# Patient Record
Sex: FEMALE | Race: WHITE | NOT HISPANIC OR LATINO | Employment: FULL TIME | URBAN - METROPOLITAN AREA
[De-identification: names, ages, dates, MRNs, and addresses within clinical notes are randomized per-mention and may not be internally consistent; named-entity substitution may affect disease eponyms.]

---

## 2017-01-04 RX ORDER — PHENOL 1.4 %
10 AEROSOL, SPRAY (ML) MUCOUS MEMBRANE NIGHTLY
COMMUNITY

## 2017-01-04 RX ORDER — AMITRIPTYLINE HYDROCHLORIDE 10 MG/1
10 TABLET, FILM COATED ORAL NIGHTLY
COMMUNITY
End: 2018-05-03 | Stop reason: SDUPTHER

## 2017-01-04 RX ORDER — ESCITALOPRAM OXALATE 10 MG/1
10 TABLET ORAL DAILY
COMMUNITY
End: 2017-12-21

## 2017-01-09 ENCOUNTER — HOSPITAL ENCOUNTER (OUTPATIENT)
Dept: ULTRASOUND IMAGING | Facility: HOSPITAL | Age: 62
Discharge: HOME | End: 2017-01-09
Attending: OBSTETRICS & GYNECOLOGY | Admitting: OBSTETRICS & GYNECOLOGY

## 2017-01-09 VITALS
HEIGHT: 66 IN | BODY MASS INDEX: 30.53 KG/M2 | DIASTOLIC BLOOD PRESSURE: 90 MMHG | OXYGEN SATURATION: 99 % | RESPIRATION RATE: 16 BRPM | SYSTOLIC BLOOD PRESSURE: 140 MMHG | HEART RATE: 87 BPM | WEIGHT: 190 LBS | TEMPERATURE: 98.2 F

## 2017-01-09 DIAGNOSIS — N63.0 BREAST MASS: ICD-10-CM

## 2017-01-09 PROCEDURE — 88305 TISSUE EXAM BY PATHOLOGIST: CPT | Performed by: OBSTETRICS & GYNECOLOGY

## 2017-01-09 RX ORDER — LIDOCAINE HYDROCHLORIDE AND EPINEPHRINE 10; 10 MG/ML; UG/ML
20 INJECTION, SOLUTION INFILTRATION; PERINEURAL ONCE
Status: COMPLETED | OUTPATIENT
Start: 2017-01-09 | End: 2017-01-09

## 2017-01-09 RX ADMIN — LIDOCAINE HYDROCHLORIDE AND EPINEPHRINE 10 ML: 10; 10 INJECTION, SOLUTION INFILTRATION; PERINEURAL at 14:20

## 2017-01-09 RX ADMIN — SODIUM BICARBONATE 10 ML: 84 INJECTION, SOLUTION INTRAVENOUS at 14:20

## 2017-01-11 PROBLEM — C50.911 MALIGNANT NEOPLASM OF RIGHT FEMALE BREAST: Status: ACTIVE | Noted: 2017-01-11

## 2017-01-12 ENCOUNTER — HOSPITAL ENCOUNTER (OUTPATIENT)
Dept: ULTRASOUND IMAGING | Facility: HOSPITAL | Age: 62
Discharge: HOME | End: 2017-01-12
Attending: OBSTETRICS & GYNECOLOGY

## 2017-01-13 LAB
CYTO UR: NORMAL
LAB AP CASE REPORT: NORMAL
Lab: NORMAL
PATH REPORT.ADDENDUM SPEC: NORMAL
PATH REPORT.FINAL DX SPEC: NORMAL
PATH REPORT.GROSS SPEC: NORMAL

## 2017-01-23 ENCOUNTER — OFFICE VISIT (OUTPATIENT)
Dept: MAMMOGRAPHY | Facility: CLINIC | Age: 62
End: 2017-01-23

## 2017-01-23 VITALS
BODY MASS INDEX: 30.13 KG/M2 | HEIGHT: 67 IN | DIASTOLIC BLOOD PRESSURE: 75 MMHG | HEART RATE: 86 BPM | WEIGHT: 192 LBS | TEMPERATURE: 97.9 F | SYSTOLIC BLOOD PRESSURE: 135 MMHG | OXYGEN SATURATION: 96 %

## 2017-01-23 DIAGNOSIS — C50.411 BREAST CANCER OF UPPER-OUTER QUADRANT OF RIGHT FEMALE BREAST (HCC): Primary | ICD-10-CM

## 2017-01-23 PROCEDURE — 99205 OFFICE O/P NEW HI 60 MIN: CPT | Performed by: SURGERY

## 2017-01-23 RX ORDER — ACETAMINOPHEN 325 MG/1
1000 TABLET ORAL ONCE
Status: CANCELLED | OUTPATIENT
Start: 2017-01-23 | End: 2017-01-23

## 2017-01-23 RX ORDER — LUTEIN 10 MG
TABLET ORAL
COMMUNITY
End: 2019-08-29

## 2017-01-23 RX ORDER — LIDOCAINE AND PRILOCAINE 25; 25 MG/G; MG/G
CREAM TOPICAL ONCE
Status: CANCELLED | OUTPATIENT
Start: 2017-01-23 | End: 2017-01-23

## 2017-01-23 RX ORDER — CELECOXIB 100 MG/1
400 CAPSULE ORAL ONCE
Status: CANCELLED | OUTPATIENT
Start: 2017-01-23 | End: 2017-01-23

## 2017-01-23 RX ORDER — DIAZEPAM 2 MG/1
10 TABLET ORAL ONCE
Status: CANCELLED | OUTPATIENT
Start: 2017-01-23 | End: 2017-01-23

## 2017-01-23 RX ORDER — CEFAZOLIN SODIUM 2 G/100ML
2 INJECTION, SOLUTION INTRAVENOUS ONCE
Status: CANCELLED | OUTPATIENT
Start: 2017-01-23 | End: 2017-01-23

## 2017-01-24 ENCOUNTER — TELEPHONE (OUTPATIENT)
Dept: MAMMOGRAPHY | Facility: CLINIC | Age: 62
End: 2017-01-24

## 2017-01-24 NOTE — TELEPHONE ENCOUNTER
Left patient voicemail for PAT appointment time and date.    Feb 6th arrival time is 1230 pm main entrance building c    sd/

## 2017-02-08 ENCOUNTER — APPOINTMENT (OUTPATIENT)
Dept: PREADMISSION TESTING | Facility: HOSPITAL | Age: 62
End: 2017-02-08

## 2017-02-08 ENCOUNTER — HOSPITAL ENCOUNTER (OUTPATIENT)
Dept: GENERAL RADIOLOGY | Facility: HOSPITAL | Age: 62
Discharge: HOME | End: 2017-02-08
Admitting: SURGERY

## 2017-02-08 DIAGNOSIS — C50.411 BREAST CANCER OF UPPER-OUTER QUADRANT OF RIGHT FEMALE BREAST (HCC): ICD-10-CM

## 2017-02-08 LAB
ALBUMIN SERPL-MCNC: 4 G/DL (ref 3.5–5.2)
ALBUMIN/GLOB SERPL: 1.4 G/DL
ALP SERPL-CCNC: 85 U/L (ref 39–117)
ALT SERPL W P-5'-P-CCNC: 17 U/L (ref 1–33)
ANION GAP SERPL CALCULATED.3IONS-SCNC: 10.2 MMOL/L
AST SERPL-CCNC: 15 U/L (ref 1–32)
BILIRUB SERPL-MCNC: <0.2 MG/DL (ref 0.1–1.2)
BUN BLD-MCNC: 11 MG/DL (ref 8–23)
BUN/CREAT SERPL: 19 (ref 7–25)
CALCIUM SPEC-SCNC: 9.5 MG/DL (ref 8.6–10.5)
CHLORIDE SERPL-SCNC: 103 MMOL/L (ref 98–107)
CO2 SERPL-SCNC: 27.8 MMOL/L (ref 22–29)
CREAT BLD-MCNC: 0.58 MG/DL (ref 0.57–1)
DEPRECATED RDW RBC AUTO: 44.1 FL (ref 37–54)
ERYTHROCYTE [DISTWIDTH] IN BLOOD BY AUTOMATED COUNT: 12.6 % (ref 11.7–13)
GFR SERPL CREATININE-BSD FRML MDRD: 106 ML/MIN/1.73
GLOBULIN UR ELPH-MCNC: 2.8 GM/DL
GLUCOSE BLD-MCNC: 100 MG/DL (ref 65–99)
HCT VFR BLD AUTO: 38.1 % (ref 35.6–45.5)
HGB BLD-MCNC: 12.5 G/DL (ref 11.9–15.5)
MCH RBC QN AUTO: 31.4 PG (ref 26.9–32)
MCHC RBC AUTO-ENTMCNC: 32.8 G/DL (ref 32.4–36.3)
MCV RBC AUTO: 95.7 FL (ref 80.5–98.2)
PLATELET # BLD AUTO: 273 10*3/MM3 (ref 140–500)
PMV BLD AUTO: 8.9 FL (ref 6–12)
POTASSIUM BLD-SCNC: 3.9 MMOL/L (ref 3.5–5.2)
PROT SERPL-MCNC: 6.8 G/DL (ref 6–8.5)
RBC # BLD AUTO: 3.98 10*6/MM3 (ref 3.9–5.2)
SODIUM BLD-SCNC: 141 MMOL/L (ref 136–145)
WBC NRBC COR # BLD: 4.55 10*3/MM3 (ref 4.5–10.7)

## 2017-02-08 PROCEDURE — 36415 COLL VENOUS BLD VENIPUNCTURE: CPT

## 2017-02-08 PROCEDURE — 80053 COMPREHEN METABOLIC PANEL: CPT | Performed by: SURGERY

## 2017-02-08 PROCEDURE — 71020 HC CHEST PA AND LATERAL: CPT

## 2017-02-08 PROCEDURE — 93005 ELECTROCARDIOGRAM TRACING: CPT

## 2017-02-08 PROCEDURE — 93010 ELECTROCARDIOGRAM REPORT: CPT | Performed by: INTERNAL MEDICINE

## 2017-02-08 PROCEDURE — 85027 COMPLETE CBC AUTOMATED: CPT | Performed by: SURGERY

## 2017-02-08 NOTE — DISCHARGE INSTRUCTIONS
Take the following medications the morning of surgery with a small sip of water.  NONE    ARRIVE AT 1030.        General Instructions:  • Do not eat or drink after midnight: includes water, mints, or gum. You may brush your teeth.  • Do not smoke, chew tobacco, or drink alcohol.  • Bring medications in original bottles, any inhalers and if applicable your C-PAP/ BI-PAP machine.  • Bring any papers given to you in the doctor’s office.  • Wear clean comfortable clothes and socks.  • Do not wear contact lenses or make-up.  Bring a case for your glasses if applicable.   • Bring crutches or walker if applicable.  • Leave all other valuables and jewelry at home.    If you were given a blood bank ID arm band remember to bring it with you the day of surgery.    Preventing a Surgical Site Infection:  Shower on the morning of surgery using a fresh bar of anti-bacterial soap (such as Dial) and clean washcloth.  Dry with a clean towel and dress in clean clothing.  For 2 to 3 days before surgery, avoid shaving with a razor near where you will have surgery because the razor can irritate skin and make it easier to develop an infection  Ask your surgeon if you will be receiving antibiotics prior to surgery  Make sure you, your family, and all healthcare providers clean their hands with soap and water or an alcohol based hand  before caring for you or your wound  If at all possible, quit smoking as many days before surgery as you can.    Day of surgery:  Upon arrival, a Pre-op nurse and Anesthesiologist will review your health history, obtain vital signs, and answer questions you may have.  The only belongings needed at this time will be your home medications and if applicable your C-PAP/BI-PAP machine.  If you are staying overnight your family can leave the rest of your belongings in the car and bring them to your room later.  A Pre-op nurse will start an IV and you may receive medication in preparation for surgery,  including something to help you relax.  Your family will be able to see you in the Pre-op area.  While you are in surgery your family should notify the waiting room  if they leave the waiting room area and provide a contact phone number.    Please be aware that surgery does come with discomfort.  We want to make every effort to control your discomfort so please discuss any uncontrolled symptoms with your nurse.   Your doctor will most likely have prescribed pain medications.      If you are going home after surgery you will receive individualized written care instructions before being discharged.  A responsible adult must drive you to and from the hospital on the day of your surgery and stay with you for 24 hours.    If you are staying overnight following surgery, you will be transported to your hospital room following the recovery period.  [facility] has all private rooms.    If you have any questions please call Pre-Admission Testing at [phone].  Deductibles and co-payments are collected on the day of service. Please be prepared to pay the required co-pay, deductible or deposit on the day of service as defined by your plan.

## 2017-02-11 ENCOUNTER — ANESTHESIA EVENT (OUTPATIENT)
Dept: PERIOP | Facility: HOSPITAL | Age: 62
End: 2017-02-11

## 2017-02-11 NOTE — ANESTHESIA PREPROCEDURE EVALUATION
Anesthesia Evaluation     Patient summary reviewed and Nursing notes reviewed   history of anesthetic complications: PONV     Airway   Mallampati: I  TM distance: >3 FB  Neck ROM: full  no difficulty expected  Dental      Pulmonary - negative pulmonary ROS   (-) wheezes  Cardiovascular - negative cardio ROS    ECG reviewed  Rhythm: regular  Rate: normal        Neuro/Psych  (+) psychiatric history Depression,    GI/Hepatic/Renal/Endo - negative ROS     Musculoskeletal (-) negative ROS    Abdominal    Substance History - negative use     OB/GYN negative ob/gyn ROS         Other                                  Anesthesia Plan    ASA 2     general   (EKG: NSR with T wave//left atrial abnormalities  Scope patch ordered pre operatively as requested by patient who also prefers zofran and, if needed, phenergan)  intravenous induction

## 2017-02-13 ENCOUNTER — HOSPITAL ENCOUNTER (OUTPATIENT)
Dept: MAMMOGRAPHY | Facility: HOSPITAL | Age: 62
Discharge: HOME | End: 2017-02-13
Attending: SURGERY

## 2017-02-13 ENCOUNTER — HOSPITAL ENCOUNTER (OUTPATIENT)
Facility: HOSPITAL | Age: 62
Setting detail: HOSPITAL OUTPATIENT SURGERY
Discharge: HOME | End: 2017-02-13
Attending: SURGERY | Admitting: SURGERY

## 2017-02-13 ENCOUNTER — APPOINTMENT (OUTPATIENT)
Dept: GENERAL RADIOLOGY | Facility: HOSPITAL | Age: 62
End: 2017-02-13

## 2017-02-13 ENCOUNTER — HOSPITAL ENCOUNTER (OUTPATIENT)
Dept: NUCLEAR MEDICINE | Facility: HOSPITAL | Age: 62
Discharge: HOME | End: 2017-02-13
Attending: SURGERY

## 2017-02-13 ENCOUNTER — ANESTHESIA (OUTPATIENT)
Dept: PERIOP | Facility: HOSPITAL | Age: 62
End: 2017-02-13

## 2017-02-13 VITALS
RESPIRATION RATE: 16 BRPM | SYSTOLIC BLOOD PRESSURE: 133 MMHG | HEART RATE: 84 BPM | DIASTOLIC BLOOD PRESSURE: 86 MMHG | WEIGHT: 191 LBS | BODY MASS INDEX: 29.91 KG/M2 | TEMPERATURE: 98.1 F | OXYGEN SATURATION: 94 %

## 2017-02-13 DIAGNOSIS — C50.411 BREAST CANCER OF UPPER-OUTER QUADRANT OF RIGHT FEMALE BREAST (HCC): ICD-10-CM

## 2017-02-13 PROCEDURE — 25010000002 MIDAZOLAM PER 1 MG: Performed by: ANESTHESIOLOGY

## 2017-02-13 PROCEDURE — A9541 TC99M SULFUR COLLOID: HCPCS | Performed by: SURGERY

## 2017-02-13 PROCEDURE — 38525 BIOPSY/REMOVAL LYMPH NODES: CPT | Performed by: SURGERY

## 2017-02-13 PROCEDURE — 25010000003 CEFAZOLIN IN DEXTROSE 2-4 GM/100ML-% SOLUTION: Performed by: SURGERY

## 2017-02-13 PROCEDURE — 19301 PARTIAL MASTECTOMY: CPT | Performed by: SURGERY

## 2017-02-13 PROCEDURE — 38792 RA TRACER ID OF SENTINL NODE: CPT

## 2017-02-13 PROCEDURE — 25010000002 FENTANYL CITRATE (PF) 100 MCG/2ML SOLUTION: Performed by: NURSE ANESTHETIST, CERTIFIED REGISTERED

## 2017-02-13 PROCEDURE — 0 TECHNETIUM FILTERED SULFUR COLLOID: Performed by: SURGERY

## 2017-02-13 PROCEDURE — 25010000002 FENTANYL CITRATE (PF) 100 MCG/2ML SOLUTION: Performed by: ANESTHESIOLOGY

## 2017-02-13 PROCEDURE — 38900 IO MAP OF SENT LYMPH NODE: CPT | Performed by: SURGERY

## 2017-02-13 PROCEDURE — 25010000002 ONDANSETRON PER 1 MG: Performed by: ANESTHESIOLOGY

## 2017-02-13 PROCEDURE — 88307 TISSUE EXAM BY PATHOLOGIST: CPT | Performed by: SURGERY

## 2017-02-13 PROCEDURE — 25010000002 PROPOFOL 10 MG/ML EMULSION: Performed by: NURSE ANESTHETIST, CERTIFIED REGISTERED

## 2017-02-13 PROCEDURE — 25010000002 DEXAMETHASONE PER 1 MG: Performed by: NURSE ANESTHETIST, CERTIFIED REGISTERED

## 2017-02-13 RX ORDER — OXYCODONE AND ACETAMINOPHEN 7.5; 325 MG/1; MG/1
1 TABLET ORAL ONCE AS NEEDED
Status: DISCONTINUED | OUTPATIENT
Start: 2017-02-13 | End: 2017-02-13 | Stop reason: HOSPADM

## 2017-02-13 RX ORDER — HYDROMORPHONE HYDROCHLORIDE 1 MG/ML
0.5 INJECTION, SOLUTION INTRAMUSCULAR; INTRAVENOUS; SUBCUTANEOUS
Status: DISCONTINUED | OUTPATIENT
Start: 2017-02-13 | End: 2017-02-13 | Stop reason: HOSPADM

## 2017-02-13 RX ORDER — MIDAZOLAM HYDROCHLORIDE 1 MG/ML
2 INJECTION INTRAMUSCULAR; INTRAVENOUS
Status: DISCONTINUED | OUTPATIENT
Start: 2017-02-13 | End: 2017-02-13 | Stop reason: HOSPADM

## 2017-02-13 RX ORDER — PROMETHAZINE HYDROCHLORIDE 25 MG/1
25 SUPPOSITORY RECTAL ONCE AS NEEDED
Status: DISCONTINUED | OUTPATIENT
Start: 2017-02-13 | End: 2017-02-13 | Stop reason: HOSPADM

## 2017-02-13 RX ORDER — SCOLOPAMINE TRANSDERMAL SYSTEM 1 MG/1
1 PATCH, EXTENDED RELEASE TRANSDERMAL
Status: DISCONTINUED | OUTPATIENT
Start: 2017-02-13 | End: 2017-02-13 | Stop reason: HOSPADM

## 2017-02-13 RX ORDER — HYDROCODONE BITARTRATE AND ACETAMINOPHEN 5; 325 MG/1; MG/1
1-2 TABLET ORAL EVERY 4 HOURS PRN
Qty: 20 TABLET | Refills: 0 | Status: SHIPPED | OUTPATIENT
Start: 2017-02-13 | End: 2017-04-24 | Stop reason: SDDI

## 2017-02-13 RX ORDER — PROMETHAZINE HYDROCHLORIDE 25 MG/ML
12.5 INJECTION, SOLUTION INTRAMUSCULAR; INTRAVENOUS ONCE AS NEEDED
Status: DISCONTINUED | OUTPATIENT
Start: 2017-02-13 | End: 2017-02-13 | Stop reason: HOSPADM

## 2017-02-13 RX ORDER — LIDOCAINE HYDROCHLORIDE 20 MG/ML
INJECTION, SOLUTION INFILTRATION; PERINEURAL AS NEEDED
Status: DISCONTINUED | OUTPATIENT
Start: 2017-02-13 | End: 2017-02-13 | Stop reason: SURG

## 2017-02-13 RX ORDER — HYDROMORPHONE HYDROCHLORIDE 1 MG/ML
0.25 INJECTION, SOLUTION INTRAMUSCULAR; INTRAVENOUS; SUBCUTANEOUS
Status: DISCONTINUED | OUTPATIENT
Start: 2017-02-13 | End: 2017-02-13 | Stop reason: HOSPADM

## 2017-02-13 RX ORDER — CELECOXIB 100 MG/1
400 CAPSULE ORAL ONCE
Status: COMPLETED | OUTPATIENT
Start: 2017-02-13 | End: 2017-02-13

## 2017-02-13 RX ORDER — DIAZEPAM 2 MG/1
10 TABLET ORAL ONCE
Status: COMPLETED | OUTPATIENT
Start: 2017-02-13 | End: 2017-02-13

## 2017-02-13 RX ORDER — FENTANYL CITRATE 50 UG/ML
50 INJECTION, SOLUTION INTRAMUSCULAR; INTRAVENOUS
Status: DISCONTINUED | OUTPATIENT
Start: 2017-02-13 | End: 2017-02-13 | Stop reason: HOSPADM

## 2017-02-13 RX ORDER — PROMETHAZINE HYDROCHLORIDE 25 MG/1
12.5 TABLET ORAL ONCE AS NEEDED
Status: DISCONTINUED | OUTPATIENT
Start: 2017-02-13 | End: 2017-02-13 | Stop reason: HOSPADM

## 2017-02-13 RX ORDER — HYDRALAZINE HYDROCHLORIDE 20 MG/ML
5 INJECTION INTRAMUSCULAR; INTRAVENOUS
Status: DISCONTINUED | OUTPATIENT
Start: 2017-02-13 | End: 2017-02-13 | Stop reason: HOSPADM

## 2017-02-13 RX ORDER — MIDAZOLAM HYDROCHLORIDE 1 MG/ML
1 INJECTION INTRAMUSCULAR; INTRAVENOUS
Status: DISCONTINUED | OUTPATIENT
Start: 2017-02-13 | End: 2017-02-13 | Stop reason: HOSPADM

## 2017-02-13 RX ORDER — FAMOTIDINE 10 MG/ML
20 INJECTION, SOLUTION INTRAVENOUS ONCE
Status: COMPLETED | OUTPATIENT
Start: 2017-02-13 | End: 2017-02-13

## 2017-02-13 RX ORDER — CEFAZOLIN SODIUM 2 G/100ML
2 INJECTION, SOLUTION INTRAVENOUS ONCE
Status: COMPLETED | OUTPATIENT
Start: 2017-02-13 | End: 2017-02-13

## 2017-02-13 RX ORDER — MAGNESIUM HYDROXIDE 1200 MG/15ML
LIQUID ORAL AS NEEDED
Status: DISCONTINUED | OUTPATIENT
Start: 2017-02-13 | End: 2017-02-13 | Stop reason: HOSPADM

## 2017-02-13 RX ORDER — SODIUM CHLORIDE 0.9 % (FLUSH) 0.9 %
1-10 SYRINGE (ML) INJECTION AS NEEDED
Status: DISCONTINUED | OUTPATIENT
Start: 2017-02-13 | End: 2017-02-13 | Stop reason: HOSPADM

## 2017-02-13 RX ORDER — ONDANSETRON 2 MG/ML
4 INJECTION INTRAMUSCULAR; INTRAVENOUS ONCE AS NEEDED
Status: DISCONTINUED | OUTPATIENT
Start: 2017-02-13 | End: 2017-02-13 | Stop reason: HOSPADM

## 2017-02-13 RX ORDER — DEXAMETHASONE SODIUM PHOSPHATE 10 MG/ML
INJECTION INTRAMUSCULAR; INTRAVENOUS AS NEEDED
Status: DISCONTINUED | OUTPATIENT
Start: 2017-02-13 | End: 2017-02-13 | Stop reason: SURG

## 2017-02-13 RX ORDER — SODIUM CHLORIDE, SODIUM LACTATE, POTASSIUM CHLORIDE, CALCIUM CHLORIDE 600; 310; 30; 20 MG/100ML; MG/100ML; MG/100ML; MG/100ML
9 INJECTION, SOLUTION INTRAVENOUS CONTINUOUS
Status: DISCONTINUED | OUTPATIENT
Start: 2017-02-13 | End: 2017-02-13 | Stop reason: HOSPADM

## 2017-02-13 RX ORDER — HYDROCODONE BITARTRATE AND ACETAMINOPHEN 7.5; 325 MG/1; MG/1
1 TABLET ORAL ONCE AS NEEDED
Status: COMPLETED | OUTPATIENT
Start: 2017-02-13 | End: 2017-02-13

## 2017-02-13 RX ORDER — ONDANSETRON 2 MG/ML
INJECTION INTRAMUSCULAR; INTRAVENOUS AS NEEDED
Status: DISCONTINUED | OUTPATIENT
Start: 2017-02-13 | End: 2017-02-13 | Stop reason: SURG

## 2017-02-13 RX ORDER — LABETALOL HYDROCHLORIDE 5 MG/ML
5 INJECTION, SOLUTION INTRAVENOUS
Status: DISCONTINUED | OUTPATIENT
Start: 2017-02-13 | End: 2017-02-13 | Stop reason: HOSPADM

## 2017-02-13 RX ORDER — NALOXONE HCL 0.4 MG/ML
0.2 VIAL (ML) INJECTION AS NEEDED
Status: DISCONTINUED | OUTPATIENT
Start: 2017-02-13 | End: 2017-02-13 | Stop reason: HOSPADM

## 2017-02-13 RX ORDER — PROMETHAZINE HYDROCHLORIDE 25 MG/1
25 TABLET ORAL ONCE AS NEEDED
Status: DISCONTINUED | OUTPATIENT
Start: 2017-02-13 | End: 2017-02-13 | Stop reason: HOSPADM

## 2017-02-13 RX ORDER — FLUMAZENIL 0.1 MG/ML
0.2 INJECTION INTRAVENOUS AS NEEDED
Status: DISCONTINUED | OUTPATIENT
Start: 2017-02-13 | End: 2017-02-13 | Stop reason: HOSPADM

## 2017-02-13 RX ORDER — ACETAMINOPHEN 325 MG/1
1000 TABLET ORAL ONCE
Status: COMPLETED | OUTPATIENT
Start: 2017-02-13 | End: 2017-02-13

## 2017-02-13 RX ORDER — BUPIVACAINE HYDROCHLORIDE AND EPINEPHRINE 2.5; 5 MG/ML; UG/ML
INJECTION, SOLUTION INFILTRATION; PERINEURAL AS NEEDED
Status: DISCONTINUED | OUTPATIENT
Start: 2017-02-13 | End: 2017-02-13 | Stop reason: HOSPADM

## 2017-02-13 RX ORDER — DIPHENHYDRAMINE HYDROCHLORIDE 50 MG/ML
12.5 INJECTION INTRAMUSCULAR; INTRAVENOUS
Status: DISCONTINUED | OUTPATIENT
Start: 2017-02-13 | End: 2017-02-13 | Stop reason: HOSPADM

## 2017-02-13 RX ORDER — LIDOCAINE AND PRILOCAINE 25; 25 MG/G; MG/G
CREAM TOPICAL
Status: COMPLETED
Start: 2017-02-13 | End: 2017-02-13

## 2017-02-13 RX ORDER — LIDOCAINE AND PRILOCAINE 25; 25 MG/G; MG/G
CREAM TOPICAL ONCE
Status: COMPLETED | OUTPATIENT
Start: 2017-02-13 | End: 2017-02-13

## 2017-02-13 RX ORDER — FENTANYL CITRATE 50 UG/ML
INJECTION, SOLUTION INTRAMUSCULAR; INTRAVENOUS AS NEEDED
Status: DISCONTINUED | OUTPATIENT
Start: 2017-02-13 | End: 2017-02-13 | Stop reason: SURG

## 2017-02-13 RX ORDER — LIDOCAINE WITH 8.4% SOD BICARB 0.9%(10ML)
3 SYRINGE (ML) INJECTION ONCE
Status: COMPLETED | OUTPATIENT
Start: 2017-02-13 | End: 2017-02-13

## 2017-02-13 RX ORDER — PROPOFOL 10 MG/ML
VIAL (ML) INTRAVENOUS AS NEEDED
Status: DISCONTINUED | OUTPATIENT
Start: 2017-02-13 | End: 2017-02-13 | Stop reason: SURG

## 2017-02-13 RX ORDER — GLYCOPYRROLATE 0.2 MG/ML
INJECTION INTRAMUSCULAR; INTRAVENOUS AS NEEDED
Status: DISCONTINUED | OUTPATIENT
Start: 2017-02-13 | End: 2017-02-13 | Stop reason: SURG

## 2017-02-13 RX ADMIN — SCOPALAMINE 1 PATCH: 1 PATCH, EXTENDED RELEASE TRANSDERMAL at 11:33

## 2017-02-13 RX ADMIN — DIAZEPAM 10 MG: 5 TABLET ORAL at 11:33

## 2017-02-13 RX ADMIN — ACETAMINOPHEN 975 MG: 325 TABLET ORAL at 11:32

## 2017-02-13 RX ADMIN — LIDOCAINE AND PRILOCAINE: 25; 25 CREAM TOPICAL at 10:50

## 2017-02-13 RX ADMIN — FENTANYL CITRATE 25 MCG: 50 INJECTION INTRAMUSCULAR; INTRAVENOUS at 16:40

## 2017-02-13 RX ADMIN — EPHEDRINE SULFATE 5 MG: 50 INJECTION INTRAMUSCULAR; INTRAVENOUS; SUBCUTANEOUS at 16:09

## 2017-02-13 RX ADMIN — FAMOTIDINE 20 MG: 10 INJECTION, SOLUTION INTRAVENOUS at 13:30

## 2017-02-13 RX ADMIN — GLYCOPYRROLATE 0.2 MG: 0.2 INJECTION INTRAMUSCULAR; INTRAVENOUS at 15:11

## 2017-02-13 RX ADMIN — MIDAZOLAM 1 MG: 1 INJECTION INTRAMUSCULAR; INTRAVENOUS at 14:44

## 2017-02-13 RX ADMIN — FENTANYL CITRATE 50 MCG: 50 INJECTION INTRAMUSCULAR; INTRAVENOUS at 17:13

## 2017-02-13 RX ADMIN — EPHEDRINE SULFATE 5 MG: 50 INJECTION INTRAMUSCULAR; INTRAVENOUS; SUBCUTANEOUS at 16:02

## 2017-02-13 RX ADMIN — MIDAZOLAM 1 MG: 1 INJECTION INTRAMUSCULAR; INTRAVENOUS at 14:43

## 2017-02-13 RX ADMIN — ONDANSETRON 4 MG: 2 INJECTION INTRAMUSCULAR; INTRAVENOUS at 15:44

## 2017-02-13 RX ADMIN — FENTANYL CITRATE 25 MCG: 50 INJECTION INTRAMUSCULAR; INTRAVENOUS at 14:59

## 2017-02-13 RX ADMIN — FENTANYL CITRATE 25 MCG: 50 INJECTION INTRAMUSCULAR; INTRAVENOUS at 15:26

## 2017-02-13 RX ADMIN — DEXAMETHASONE SODIUM PHOSPHATE 8 MG: 10 INJECTION INTRAMUSCULAR; INTRAVENOUS at 15:18

## 2017-02-13 RX ADMIN — Medication 3 ML: at 12:40

## 2017-02-13 RX ADMIN — CELECOXIB 400 MG: 100 CAPSULE ORAL at 11:46

## 2017-02-13 RX ADMIN — SODIUM CHLORIDE, POTASSIUM CHLORIDE, SODIUM LACTATE AND CALCIUM CHLORIDE 9 ML/HR: 600; 310; 30; 20 INJECTION, SOLUTION INTRAVENOUS at 17:12

## 2017-02-13 RX ADMIN — HYDROCODONE BITARTRATE AND ACETAMINOPHEN 1 TABLET: 7.5; 325 TABLET ORAL at 17:35

## 2017-02-13 RX ADMIN — FENTANYL CITRATE 50 MCG: 50 INJECTION INTRAMUSCULAR; INTRAVENOUS at 16:59

## 2017-02-13 RX ADMIN — MIDAZOLAM 1 MG: 1 INJECTION INTRAMUSCULAR; INTRAVENOUS at 01:00

## 2017-02-13 RX ADMIN — CEFAZOLIN SODIUM 2 G: 2 INJECTION, SOLUTION INTRAVENOUS at 14:54

## 2017-02-13 RX ADMIN — SODIUM CHLORIDE, POTASSIUM CHLORIDE, SODIUM LACTATE AND CALCIUM CHLORIDE 9 ML/HR: 600; 310; 30; 20 INJECTION, SOLUTION INTRAVENOUS at 13:30

## 2017-02-13 RX ADMIN — LIDOCAINE HYDROCHLORIDE 100 MG: 20 INJECTION, SOLUTION INFILTRATION; PERINEURAL at 14:54

## 2017-02-13 RX ADMIN — Medication 1 DOSE: at 13:03

## 2017-02-13 RX ADMIN — PROPOFOL 200 MG: 10 INJECTION, EMULSION INTRAVENOUS at 14:54

## 2017-02-13 RX ADMIN — FENTANYL CITRATE 25 MCG: 50 INJECTION INTRAMUSCULAR; INTRAVENOUS at 14:54

## 2017-02-13 NOTE — PLAN OF CARE
Problem: Patient Care Overview (Adult)  Goal: Plan of Care Review  Outcome: Ongoing (interventions implemented as appropriate)    02/13/17 1805   Coping/Psychosocial Response Interventions   Plan Of Care Reviewed With patient   Patient Care Overview   Progress improving       Goal: Adult Individualization and Mutuality  Outcome: Ongoing (interventions implemented as appropriate)  Goal: Discharge Needs Assessment  Outcome: Ongoing (interventions implemented as appropriate)    Problem: Perioperative Period (Adult)  Goal: Signs and Symptoms of Listed Potential Problems Will be Absent or Manageable (Perioperative Period)  Outcome: Ongoing (interventions implemented as appropriate)

## 2017-02-13 NOTE — ANESTHESIA PROCEDURE NOTES
Airway  Urgency: elective    Date/Time: 2/13/2017 3:00 PM  End Time:2/13/2017 3:00 PM  Airway not difficult    General Information and Staff    Patient location during procedure: OR  Anesthesiologist: RENDER, TABITHA RAY  CRNA: RAFA RANGEL    Indications and Patient Condition  Indications for airway management: airway protection    Preoxygenated: yes  MILS maintained throughout  Mask difficulty assessment: 1 - vent by mask    Final Airway Details  Final airway type: supraglottic airway      Successful airway: oropharyngeal  Size 4    Number of attempts at approach: 1

## 2017-02-13 NOTE — ANESTHESIA POSTPROCEDURE EVALUATION
Patient: Jaimie Jeffries    Procedure Summary     Date Anesthesia Start Anesthesia Stop Room / Location    02/13/17 1446 [id number]  [facility]  /  [facility]       Procedure Diagnosis Surgeon Provider    BREAST LUMPECTOMY WITH SENTINEL NODE BIOPSY AND NEEDLE LOCALIZATION (Right Breast) Breast cancer of upper-outer quadrant of right female breast  (Breast cancer of upper-outer quadrant of right female breast [C50.411]) MD Kareem De Guzman MD          Anesthesia Type: general  Last vitals  /86 (02/13/17 1813)    Temp 36.7 °C (98.1 °F) (02/13/17 1800)    Pulse 84 (02/13/17 1813)   Resp 16 (02/13/17 1813)    SpO2 94 % (02/13/17 1813)      Post Anesthesia Care and Evaluation    Patient participation: complete - patient cannot participate  Anesthetic complications: No anesthetic complications    Cardiovascular status: acceptable  Respiratory status: acceptable  Hydration status: acceptable    Comments: Patient was discharged prior to being evaluated by Anesthesia...per chart review, no anesthetic complications were noted.  NOT MEDICALLY DIRECTED

## 2017-02-13 NOTE — PLAN OF CARE
Problem: Patient Care Overview (Adult)  Goal: Plan of Care Review  Outcome: Outcome(s) achieved Date Met:  02/13/17  Goal: Discharge Needs Assessment  Outcome: Outcome(s) achieved Date Met:  02/13/17    Problem: Perioperative Period (Adult)  Goal: Signs and Symptoms of Listed Potential Problems Will be Absent or Manageable (Perioperative Period)  Outcome: Outcome(s) achieved Date Met:  02/13/17

## 2017-02-13 NOTE — OP NOTE
Needle Localization Breast lumpectomy, [place] Node Biopsy Procedure Note    Name: Jaimie Jeffries  Age: 61 y.o.  Sex: female  :  1955  MRN: [id number]      Pre-operative Diagnosis:rightBreast cancer, clinical  stage IA    Post-operative Diagnosis: Same    Procedure:right Needle Localization Breast lumpectomy, [place] Node Biopsy with blue dye and radioactive sulfur colloid injection    Surgeon: Fred Denny MD    Assistants: none    Anesthesia: General    Indications: This patient recently was diagnosed with right breast cancer after presenting with a right breast mammogram abnormality.  Subsequent biopsy revealed an invasive ductal cancer and she is here for breast conserving surgery.      Procedure Details   The patient was seen in the Holding Room. The risks, benefits, complications, treatment options, and expected outcomes were discussed with the patient. The possibilities of reaction to medication, pulmonary aspiration, bleeding, infection, the need for additional procedures, failure to diagnose a condition, and creating a complication requiring transfusion or operation were discussed with the patient. The patient concurred with the proposed plan, giving informed consent.  The site of surgery properly noted/marked.    The patient underwent preoperative guidewire localization of a mammographic abnormality in the  lower outer aspect of the right breast.  The patient was also taken to the nuclear med department where a radioisotope injection was performed in the periareolar area of the affected breast  in the usual fashion.     The patient was then taken to Operating Room; identified patient as Jaimie Jeffries  and the procedure verified as rightNeedle Localization  Breast lumpectomy, with sentinel node biopsy, possible axillary dissection.   A Time Out was held and the above information confirmed.    5 cc of blue dye were injected into the breast near the localization site.     The breast  was prepped and draped in standard fashion. Marcaine  0.50% with epinephrine was used to anesthetize the skin at the site of the guidewire placement and in the axilla.  An axillary incision was made in the area of the hotspot, found with the neoprobe.  3  sentinel node(s) was/were found.  The highest count was 185.  The second highest count was 109 and the third lymph node had counts as high as 91.  All 3 of these lymph nodes were blue. . No other nodes were found with counts over 20 and there were no other blue nodes.  Frozen section was not performed.   Skin closure was performed with vicryl.     A curvilinear incision was created on the breast near the localization site.  Dissection was carried down to the localized area which was completely excised.  A specimen radiograph confirmed inclusion of the preoperatively identified mammographic lesion/ clip.  Additional shave margins were obtained from areas thought to be close.  The superior margin had an additional margin obtained.. Hemostasis was achieved with cautery.  Closure was performed  with 3-0 Vicryl to bring the subcutaneous layer together and a 4-0 Vicryl subcuticular closure.      Steri-Strips were applied. At the end of the operation, all sponge, instrument, and needle counts were correct.    Findings:  There were no unexpected findings  Estimated Blood Loss:  Minimal           Drains: none          Specimens:   ID Type Source Tests Collected by Time Destination   A : RIGHT BREAST LUMPECTOMY (MARKINGS:  SINGLE SHORT SUTURE SUPERIOR, LONG SINGLE SUTURE LATERAL, SKIN IS ANTERIOR) Tissue Breast, Right TISSUE EXAM Fred Denny MD 2/13/2017 1533    B : RIGHT BREAST LUMPECTOMY ADDITIONAL MARGIN (STITCH MARKS THE NEW MARGIN) Tissue Breast, Right TISSUE EXAM Fred Denny MD 2/13/2017 1542    C : RIGHT BREAST #1 Tissue [place] Lymph Node TISSUE EXAM Fred Denny MD 2/13/2017 1601    D : RIGHT BREAST #2 Tissue [place] Lymph Node TISSUE  EXAM Fred Denny MD 2/13/2017 1613    E : RIGHT BREAST #3 Tissue [place] Lymph Node TISSUE EXAM Fred Denny MD 2/13/2017 5713        Complications:  None; patient tolerated the procedure well.           Condition: stable

## 2017-02-13 NOTE — H&P
History of Present Illness:  Patient presents with newly diagnosed breast cancer.  The patient has a strong family history of breast cancer in that her sister was diagnosed with it at age 50 and her mother had breast cancer at age 70. She has had 2 prior breast biopsies with the last one being in 2014 and it was MRI directed. She has been getting an MRI every year. Recently she had mammograms which described a subtle area of architectural distortion beneath an old biopsy clip. An MRI was obtained and it described a 1.5 cm area of suspicious enhancement. An ultrasound was performed next and it revealed a solid 10 mm suspicious mass. This was located in the 9 o'clock position of the breast. A biopsy was undertaken and revealed a grade 2 invasive ductal cancer that was ER positive at 90%, AZ positive at 90%, and HER-2 negative. There was no description of a significant component of DCIS. I have reviewed the imaging studies and agree with the findings.     Her sister with breast cancer did undergo genetic testing and was noted to be negative.        Review of Systems:  Review of Systems   All other systems reviewed and are negative.        Past Medical and Surgical History:  Breast Biopsy History:  Patient has had the following breast biopsies:Right Breast 2012- Benign, 2014-Benign, 2017-malignant  Breast Cancer HIstory:  Patient does not have a past medical history of breast cancer.  Breast Operations, and year:  Three right breast biopsy's 2012, 2014, 2017         History   Smoking Status   • Never Smoker   Smokeless Tobacco   • Not on file      Obstetric History:  Patient is postmenopausal, entered menopause naturally at age: 50   Number of pregnancies:4  Number of live births: 4  Number of abortions or miscarriages: 0  Age of delivery of first child: 27  Patient breast fed, for the following lenth of time:approx 3.5 years  Length of time taking birth control pills:1 year  Patient has never taken hormone  replacement      Surgical History           Past Surgical History   Procedure Laterality Date   • Breast biopsy Right 2012, 2014       benign   • Breast biopsy Right 2017       malignant             Medical History    History reviewed. No pertinent past medical history.        Prior Hospitalizations, other than for surgery or childbirth, and year:  none     Social History:  Patient is .  Patient has 3 daughters. and Patient has 1 sons.     Family History:        Family History   Problem Relation Age of Onset   • Breast cancer Mother 70   • Hearing loss Mother     • Breast cancer Sister 50   • Hearing loss Father     • Hyperlipidemia Father     • Hypertension Father           Vital Signs:  Vitals:     01/23/17 0818   BP: 135/75   Pulse: 86   Temp: 97.9 °F (36.6 °C)   SpO2: 96%         Medications:     Current Outpatient Prescriptions:   • Prenatal Vit-Min-FA-Fish Oil ([employer] PRENATAL GUMMY PO), Take 2 tablets by mouth daily., Disp: , Rfl:      Physical Examination:  General Appearance:   Patient is in no distress.  She is well kept and has an overweight build.   Psychiatric:  Patient with appropriate mood and affect. Alert and oriented to self, time, and place.     Breast, RIGHT: medium sized, symmetric with the contralateral side.  Breast skin is without erythema, edema, rashes.  There are no visible abnormalities upon inspection during the arm-raising maneuver or with hands on hips in the sitting position. There is no nipple retraction, discharge or nipple/areolar skin changes. There is a small biopsy scar in the 9 o'clock position. There is also some associated bruising from her biopsy and an ill-defined fullness but no discrete mass.     Breast, LEFT:  medium sized, symmetric with the contralateral side.  Breast skin is without erythema, edema, rashes.  There are no visible abnormalities upon inspection during the arm-raising maneuver or with hands on hips in the sitting position. There is no nipple  retraction, discharge or nipple/areolar skin changes.There are no masses palpable in the sitting or supine positions.     Lymphatic:  There is no axillary, cervical, infraclavicular, or supraclavicular adenopathy bilaterally.  Eyes:  Pupils are round and reactive to light.  Cardiovascular:  Heart rate and rhythm are regular. There was a 2/6 systolic ejection murmur heard best in the right second intercostal space  Respiratory:  Lungs are clear bilaterally with no crackles or wheezes in any lung field.  Gastrointestinal:  Abdomen is soft, nondistended, and nontender.  There is no evidence of hepatosplenomegaly. There are no scars from previous surgery.     Musculoskeletal:  Good strength in all 4 extremities.   There is good range of motion in both shoulders.     Skin:  No new skin lesions or rashes on the skin excluding the breast (see breast exam above).     Assessment:  Diagnoses and all orders for this visit:     Breast cancer of upper-outer quadrant of right female breast     Other orders  - Multiple Vitamins-Minerals (MULTIVITAL PO); Take by mouth.  - Lutein 10 MG tablet; Take by mouth.  - NON FORMULARY; 1 capsule Daily. NHT Nerium        Plan:  She was accompanied today by her sister and daughter. The office visit lasted 50 minutes.     We began the conversation discussing her pathology report. We talked about the origin of most of breast cancers from either the ducts or the lobules. The difference between invasive and in situ disease was explained and the visual was drawn for her. When invasion has occurred, the lymph nodes need to be evaluated. When there is in situ disease the lymph nodes should be considered if the area of concern is widespread or it is high-grade. We also discussed the significance of hormone receptors. They often can give us some idea how the breast cancer will behave and potentially lead us to offer neoadjuvant chemotherapy.     Next we discussed the surgical options which include  breast conserving therapy versus a mastectomy. With breast conserving therapy we are talking about a lumpectomy with margins, lymph node evaluation, and radiation treatment. The radiation treatment is generally given to the entire breast for 6 weeks. The side effects consist of local skin change and potential injury to nearby structures such as the lung or the heart. A mastectomy would involve removing the breast tissues with preservation of the pectoral muscles and evaluation of the lymph nodes if indicated. The potential for reconstruction by either an implant or autologous tissue was discussed. This could be performed on a delayed basis or immediately depending on a multitude of factors. The survival rates for these 2 procedures are equivalent but there are incidences where one may be favored over the other. There are times when a lumpectomy is not possible due to the large tumor to breast ratio or the location of the tumor. Previous radiation to the chest wall or collagen disorders such as scleroderma would make radiation treatment and possible and therefore exclude breast conserving therapy as an option.     She is a good candidate for either of the options were presented to her. I do not think that she needs genetic testing based on our conversation and her sister's negative testing. She would like to have breast conserving surgery performed. I have discussed that she will need a needle localization of the appropriate clip and that there is the possibility of a positive margin requiring us to go back again. She understands the risks and wishes to proceed.    There are no changes to the H&P.

## 2017-02-15 LAB
CYTO UR: NORMAL
LAB AP CASE REPORT: NORMAL
LAB AP SYNOPTIC CHECKLIST: NORMAL
Lab: NORMAL
PATH REPORT.FINAL DX SPEC: NORMAL
PATH REPORT.GROSS SPEC: NORMAL

## 2017-02-16 ENCOUNTER — TELEPHONE (OUTPATIENT)
Dept: MAMMOGRAPHY | Facility: CLINIC | Age: 62
End: 2017-02-16

## 2017-02-16 NOTE — TELEPHONE ENCOUNTER
I again left a message for her to call us regarding her path report which does show clear margins.

## 2017-02-17 ENCOUNTER — TELEPHONE (OUTPATIENT)
Dept: MAMMOGRAPHY | Facility: CLINIC | Age: 62
End: 2017-02-17

## 2017-02-17 DIAGNOSIS — C50.411 BREAST CANCER OF UPPER-OUTER QUADRANT OF RIGHT FEMALE BREAST (HCC): Primary | ICD-10-CM

## 2017-02-17 NOTE — TELEPHONE ENCOUNTER
I told her the path report shows clear margins and negative lymph nodes.  Orders have been placed for medical oncology and radiation oncology

## 2017-02-22 ENCOUNTER — CONSULT (OUTPATIENT)
Dept: ONCOLOGY | Facility: CLINIC | Age: 62
End: 2017-02-22

## 2017-02-22 ENCOUNTER — CLINICAL SUPPORT (OUTPATIENT)
Dept: ONCOLOGY | Facility: CLINIC | Age: 62
End: 2017-02-22

## 2017-02-22 ENCOUNTER — DOCUMENTATION (OUTPATIENT)
Dept: ONCOLOGY | Facility: CLINIC | Age: 62
End: 2017-02-22

## 2017-02-22 ENCOUNTER — LAB (OUTPATIENT)
Dept: LAB | Facility: HOSPITAL | Age: 62
End: 2017-02-22

## 2017-02-22 VITALS
WEIGHT: 189.6 LBS | HEART RATE: 72 BPM | BODY MASS INDEX: 29.76 KG/M2 | TEMPERATURE: 98.7 F | SYSTOLIC BLOOD PRESSURE: 120 MMHG | DIASTOLIC BLOOD PRESSURE: 78 MMHG | RESPIRATION RATE: 16 BRPM | OXYGEN SATURATION: 96 % | HEIGHT: 67 IN

## 2017-02-22 DIAGNOSIS — C50.811 MALIGNANT NEOPLASM OF OVERLAPPING SITES OF RIGHT FEMALE BREAST (HCC): Primary | ICD-10-CM

## 2017-02-22 DIAGNOSIS — C50.919 MALIGNANT NEOPLASM OF FEMALE BREAST, UNSPECIFIED LATERALITY, UNSPECIFIED SITE OF BREAST: Primary | ICD-10-CM

## 2017-02-22 DIAGNOSIS — E55.9 VITAMIN D DEFICIENCY: ICD-10-CM

## 2017-02-22 LAB
25(OH)D3 SERPL-MCNC: 43.5 NG/ML (ref 30–100)
BASOPHILS # BLD AUTO: 0.03 10*3/MM3 (ref 0–0.1)
BASOPHILS NFR BLD AUTO: 0.6 % (ref 0–1.1)
DEPRECATED RDW RBC AUTO: 42.5 FL (ref 37–49)
EOSINOPHIL # BLD AUTO: 0.14 10*3/MM3 (ref 0–0.36)
EOSINOPHIL NFR BLD AUTO: 3 % (ref 1–5)
ERYTHROCYTE [DISTWIDTH] IN BLOOD BY AUTOMATED COUNT: 12.4 % (ref 11.7–14.5)
HCT VFR BLD AUTO: 39 % (ref 34–45)
HGB BLD-MCNC: 12.9 G/DL (ref 11.5–14.9)
HOLD SPECIMEN: NORMAL
IMM GRANULOCYTES # BLD: 0.01 10*3/MM3 (ref 0–0.03)
IMM GRANULOCYTES NFR BLD: 0.2 % (ref 0–0.5)
LYMPHOCYTES # BLD AUTO: 1.59 10*3/MM3 (ref 1–3.5)
LYMPHOCYTES NFR BLD AUTO: 34.3 % (ref 20–49)
MCH RBC QN AUTO: 31.2 PG (ref 27–33)
MCHC RBC AUTO-ENTMCNC: 33.1 G/DL (ref 32–35)
MCV RBC AUTO: 94.2 FL (ref 83–97)
MONOCYTES # BLD AUTO: 0.35 10*3/MM3 (ref 0.25–0.8)
MONOCYTES NFR BLD AUTO: 7.6 % (ref 4–12)
NEUTROPHILS # BLD AUTO: 2.51 10*3/MM3 (ref 1.5–7)
NEUTROPHILS NFR BLD AUTO: 54.3 % (ref 39–75)
NRBC BLD MANUAL-RTO: 0 /100 WBC (ref 0–0)
PLATELET # BLD AUTO: 309 10*3/MM3 (ref 150–375)
PMV BLD AUTO: 8.6 FL (ref 8.9–12.1)
RBC # BLD AUTO: 4.14 10*6/MM3 (ref 3.9–5)
WBC NRBC COR # BLD: 4.63 10*3/MM3 (ref 4–10)
WHOLE BLOOD HOLD SPECIMEN: NORMAL
WHOLE BLOOD HOLD SPECIMEN: NORMAL

## 2017-02-22 PROCEDURE — 82306 VITAMIN D 25 HYDROXY: CPT | Performed by: INTERNAL MEDICINE

## 2017-02-22 PROCEDURE — 99215 OFFICE O/P EST HI 40 MIN: CPT | Performed by: INTERNAL MEDICINE

## 2017-02-22 NOTE — PROGRESS NOTES
Subjective     REASON FOR CONSULTATION:  Provide an opinion on any further workup or treatment on:    Newly diagnosed right breast cancer                       REQUESTING PHYSICIAN: Fred Denny MD      RECORDS OBTAINED:  Records of the patients history including those obtained from the referring provider were reviewed and summarized in detail.    HISTORY OF PRESENT ILLNESS:      Jaimie Jeffries is a 61 y.o. patient was referred by Fred Denny MD for evaluation of right breast cancer.    Patient has family history of breast cancer in her sister and mother.  Patient herself had 2 prior breast biopsies.  She has been getting MRIs of the breasts reveal.  On mammogram examination, there was architectural distortion close an old biopsy clip.  MRI was obtained on 12/20/16 and there was an enlarging area of clumped enhancement at 9 o'clock position in the right breast.  Diagnostic mammogram showed the area to be supple stellate area of architectural distortion.  Ultrasound of the breasts on 12/20/16 revealed a hypoechoic suspicious solid nodule measuring 10 x 7 x 8 mm.    Patient underwent ultrasound-guided biopsy on 1/9/17.  Pathology examination revealed invasive ductal carcinoma.  It was ER +90%, MO +90% and HER-2/ivy negative.  Maximum length of the invasive carcinoma was 4 mm.  It was grade 2.    On 2/13/17, patient underwent a right breast lumpectomy and sentinel lymph node biopsy.  She is recovering from the surgery.  She is not having swelling of the right arm.  She has some discomfort in the right breast.      Past Medical History   Diagnosis Date   • Breast cancer      Right   • Depression    • PONV (postoperative nausea and vomiting)      PREFER SCOPE PATCH,ZOFRAN,PHENERGAN       Past Surgical History   Procedure Laterality Date   • Breast biopsy Right 2012, 2014     benign   • Breast biopsy Right 2017     malignant   • Breast lumpectomy with sentinel node biopsy Right 2/13/2017      Procedure: BREAST LUMPECTOMY WITH SENTINEL NODE BIOPSY AND NEEDLE LOCALIZATION;  Surgeon: Fred Denny MD;  Location: [facility] OR [facility] [place];  Service:          MEDICATIONS:    Current Outpatient Prescriptions:   •  amitriptyline (ELAVIL) 10 MG tablet, Take 10 mg by mouth Every Night., Disp: , Rfl:   •  b complex-C-folic acid 1 MG capsule, Take 1 capsule by mouth Daily., Disp: , Rfl:   •  calcium carbonate-cholecalciferol 500-400 MG-UNIT tablet tablet, Take 1 tablet by mouth Daily., Disp: , Rfl:   •  escitalopram (LEXAPRO) 10 MG tablet, Take 10 mg by mouth Daily., Disp: , Rfl:   •  HYDROcodone-acetaminophen (NORCO) 5-325 MG per tablet, Take 1-2 tablets by mouth Every 4 (Four) Hours As Needed (Pain)., Disp: 20 tablet, Rfl: 0  •  Lutein 10 MG tablet, Take  by mouth., Disp: , Rfl:   •  melatonin 5 MG tablet tablet, Take 5 mg by mouth Every Night., Disp: , Rfl:   •  Multiple Vitamins-Minerals (MULTIVITAL PO), Take  by mouth., Disp: , Rfl:   •  NON FORMULARY, 1 capsule Daily. NHT Nerium, Disp: , Rfl:      ALLERGIES:  No Known Allergies     Social History     Social History   • Marital status:      Spouse name: N/A   • Number of children: 4   • Years of education: N/A     Occupational History   •  [employer]     Social History Main Topics   • Smoking status: Never Smoker   • Smokeless tobacco: Not on file   • Alcohol use 1.2 oz/week     2 Glasses of wine per week   • Drug use: No   • Sexual activity: Yes      Comment:      Other Topics Concern   • Not on file     Social History Narrative       Cancer-related family history includes Breast cancer (age of onset: 50) in her sister; Breast cancer (age of onset: 70) in her mother.  her sister had extensive genetic testing which was negative.    REVIEW OF SYSTEMS:  GENERAL:  No fever or chills. No weight change.    SKIN:  No rashes or lesions.  HEME/LYMPH: No anemia, easy bruisability or enlarged lymph nodes.  EYES:  No vision changes or  "diplopia.  ENT:  No mouth or gum bleeding, epistaxis, hoarseness or dysphagia.  RESPIRATORY:  No cough, shortness of breath, hemoptysis or wheezing.  CVS:  No chest pain, palpitations or lower extremity swelling.  GI:  No abdominal pain, nausea, vomiting, constipation, diarrhea, melena or hematochezia.  :  No dysuria, hematuria or increased frequency.   MUSCULOSKELETAL:  No bone pain or joint stiffness.  NEUROLOGICAL:  No dizziness, global weakness, loss of consciousness or seizures.  PSYCHIATRIC:  No anxiety, mood changes or depression.  .    Objective   VITAL SIGNS:  Vitals:    02/22/17 1507   BP: 120/78   Pulse: 72   Resp: 16   Temp: 98.7 °F (37.1 °C)   TempSrc: Oral   SpO2: 96%   Weight: 189 lb 9.6 oz (86 kg)   Height: 66.73\" (169.5 cm)  Comment: new ht.   PainSc: 2  Comment: rt breast       Wt Readings from Last 3 Encounters:   02/22/17 189 lb 9.6 oz (86 kg)   02/13/17 191 lb (86.6 kg)   01/23/17 192 lb (87.1 kg)       PHYSICAL EXAMINATION  GENERAL:  The patient appears in good general condition, not in acute distress  SKIN:  No skin rashes or lesions. No ecchymosis or petechiae.  HEAD:  Normocephalic.  EYES: No jaundice. No pallor.   NECK:  Supple with good ROM. No thyromegaly or masses.  LYMPHATICS:  No cervical, supraclavicular or axillary lymphadenopathy.  CHEST:  Lungs clear to auscultation. No added sounds.  CARDIAC:  Normal S1 & S2. No murmurs.  ABDOMEN:  Soft and non-tender. No palpable hepatosplenomegaly or masses.  EXTREMITIES:  No cyanosis or edema. No calf tenderness.  NEUROLOGICAL:  No focal neurological deficits.  .    RESULT REVIEW:     Results from last 7 days  Lab Units 02/22/17  1406   WBC 10*3/mm3 4.63   NEUTROS ABS 10*3/mm3 2.51   HEMOGLOBIN g/dL 12.9   HEMATOCRIT % 39.0   PLATELETS 10*3/mm3 309     CMP from 2/8/17 was normal.    Assessment/Plan   1.  Newly diagnosed right breast cancer at 9 o'clock position, invasive ductal carcinoma, grade 2 with associated ductal carcinoma in situ.  The " maximum dimension measured 4 mm.  She is status post lumpectomy and sentinel lymph node biopsy on 2/13/17.  Pathology examination of 3 lymph nodes was negative.  She is considered to have stage IA disease (T1a, N0, M0).  The tumor is ER and ME positive at 90%.  Testing for HER-2/ivy was negative.      2.  History of breast cancer in the patient's mother and sister.  The sister was diagnosed in her 40s and received chemotherapy and stem cell transplantation.  Her sister recently had genetic testing that was negative.    PLAN:    1.  Due to the small size of the primary tumor being less than 5 mm, adjuvant chemotherapy treatment is not indicated and I reassured the patient about this.    2.  As the patient had a lumpectomy, I recommended postlumpectomy radiation therapy.  We discussed this in general terms and she will see radiation oncology next week.    3.  I recommended adjuvant hormonal therapy.  The purpose is to decrease the risk of recurrence systemically as well as a reduction in the risk of development of new breast cancer contralaterally.  I recommended adjuvant hormonal therapy with anastrozole.  I discussed the side effects including hot flashes, mood changes, aches and pains and decrease in the bone density.  5 years of adjuvant hormonal therapy would be recommended if tolerated.  I will obtain the report of the most recent bone density for review.  She is taking calcium daily and I asked her to continue.  I'll obtain vitamin D level to evaluate for possible vitamin D deficiency.    We will see the patient in follow-up in mid April 2017.  She will be completing radiation therapy around that time and we will plan on starting adjuvant hormonal therapy after completion of radiation therapy.        Sangeeta Tom MD  02/22/17

## 2017-02-27 ENCOUNTER — OFFICE VISIT (OUTPATIENT)
Dept: MAMMOGRAPHY | Facility: CLINIC | Age: 62
End: 2017-02-27

## 2017-02-27 VITALS
SYSTOLIC BLOOD PRESSURE: 128 MMHG | OXYGEN SATURATION: 96 % | HEART RATE: 77 BPM | DIASTOLIC BLOOD PRESSURE: 78 MMHG | TEMPERATURE: 97.9 F

## 2017-02-27 DIAGNOSIS — C50.811 MALIGNANT NEOPLASM OF OVERLAPPING SITES OF RIGHT FEMALE BREAST (HCC): Primary | ICD-10-CM

## 2017-02-27 PROCEDURE — 99024 POSTOP FOLLOW-UP VISIT: CPT | Performed by: SURGERY

## 2017-02-27 NOTE — PROGRESS NOTES
Chief Complaint: Jaimie Jeffries is a  61 y.o. female, initially referred by No ref. provider found , who is here today for a postoperative visit.    History of Present Illness:  In the interim,Jaimie Jeffries has had the following procedure and resultant pathology report: She was found to have a 1.3 mm invasive ductal cancer with widely clear margins.  All 3 sentinel lymph nodes were negative.  She has already seen the medical oncologists and is due to see the radiation oncologist soon.    She has noted no redness, warmth,drainage, swelling at the incision site. Denies fever or chills.      Current Outpatient Prescriptions:   •  amitriptyline (ELAVIL) 10 MG tablet, Take 10 mg by mouth Every Night., Disp: , Rfl:   •  b complex-C-folic acid 1 MG capsule, Take 1 capsule by mouth Daily., Disp: , Rfl:   •  calcium carbonate-cholecalciferol 500-400 MG-UNIT tablet tablet, Take 1 tablet by mouth Daily., Disp: , Rfl:   •  escitalopram (LEXAPRO) 10 MG tablet, Take 10 mg by mouth Daily., Disp: , Rfl:   •  HYDROcodone-acetaminophen (NORCO) 5-325 MG per tablet, Take 1-2 tablets by mouth Every 4 (Four) Hours As Needed (Pain)., Disp: 20 tablet, Rfl: 0  •  Lutein 10 MG tablet, Take  by mouth., Disp: , Rfl:   •  melatonin 5 MG tablet tablet, Take 5 mg by mouth Every Night., Disp: , Rfl:   •  Multiple Vitamins-Minerals (MULTIVITAL PO), Take  by mouth., Disp: , Rfl:   •  NON FORMULARY, 1 capsule Daily. NHT Nerium, Disp: , Rfl:   Physical examination-the right breast has 2 nicely healing incisions in the Steri-Strips were removed today.  There is some reaction to the blue dye injection which will slowly improve.  Assessment:  Right breast cancer status post breast conserving surgery-she is doing extremely well.  I reviewed her path report with her and she will continue to follow with the medical oncologists as well as the radiation oncologist    Plan:  I would like to see her back after the radiation is completed.

## 2017-02-28 NOTE — PROGRESS NOTES
"Jaimie Jeffries, 61, was seen for an initial social work appointment on 2/22/17.  She and her friend Ryan came in to meet Dr. Tom and hear his recommendations for further treatment of the patient's breast cancer.  She had a right breast lumpectomy on 2/13/17.  She says she is \"doing OK\" and  is scheduled to return to work next Tuesday.  The patient is a nurse here at [employer].  Ryan expressed concern that this might be too soon to return.  He is worried that she may try to lift a patient.  She assured him that she won't.  The patient has four children, none in [place].  The closest one is the youngest who is an undergraduate at .  She has \"a lot of sisters.\" She has good social support.    I discussed social work services and gave the patient support group information.  She has contact info for Gaby Tolbert LCSW and me and should feel free to call with questions or concerns.  "

## 2017-03-01 ENCOUNTER — APPOINTMENT (OUTPATIENT)
Dept: RADIATION ONCOLOGY | Facility: HOSPITAL | Age: 62
End: 2017-03-01

## 2017-03-01 ENCOUNTER — OFFICE VISIT (OUTPATIENT)
Dept: RADIATION ONCOLOGY | Facility: HOSPITAL | Age: 62
End: 2017-03-01

## 2017-03-01 VITALS
WEIGHT: 192 LBS | HEART RATE: 78 BPM | SYSTOLIC BLOOD PRESSURE: 124 MMHG | RESPIRATION RATE: 16 BRPM | BODY MASS INDEX: 30.31 KG/M2 | TEMPERATURE: 98.9 F | OXYGEN SATURATION: 95 % | DIASTOLIC BLOOD PRESSURE: 80 MMHG

## 2017-03-01 DIAGNOSIS — C50.811 MALIGNANT NEOPLASM OF OVERLAPPING SITES OF RIGHT FEMALE BREAST (HCC): Primary | ICD-10-CM

## 2017-03-01 PROCEDURE — 77332 RADIATION TREATMENT AID(S): CPT

## 2017-03-01 PROCEDURE — 77290 THER RAD SIMULAJ FIELD CPLX: CPT

## 2017-03-01 PROCEDURE — 99243 OFF/OP CNSLTJ NEW/EST LOW 30: CPT

## 2017-03-01 PROCEDURE — G0463 HOSPITAL OUTPT CLINIC VISIT: HCPCS

## 2017-03-01 NOTE — PROGRESS NOTES
3/1/2017      Radiation Therapy Consultation:  Ms. Jaimie Jeffries   ( 1955 )    History of Present Illness:  Ms. Jaimie Jeffries is a 61 y.o. female who is here to discuss treatment options for her newly diagnosed cancer arising in the 9:00 radiant of her RIGHT breast. Biopsy on 01/09/17 revealed grade 2  IDC, ER+, OH+, and Her-2-ivy negative. On 02/13/17 she had a lumpectomy and sentinel node sampling. Both in situ and invasive ductal carcinoma was found. The primary tumor was 1.3 mm in maximum dimension. All three sampled nodes were negative. Final staging is pT1A. All surgical margins were widely clear. She is referred by Dr. Denny.  She has seen Dr. Tom and plans have been made for five years of anti-estrogen treatment with anastrazole starting after the conclusion of her radiation therapy.    Active Problem List:     Patient Active Problem List   Diagnosis   • Malignant neoplasm of overlapping sites of right female breast        Past Medical History: she  has a past medical history of Breast cancer; Depression; and PONV (postoperative nausea and vomiting).    Past Surgical History:  she has a past surgical history that includes Breast biopsy (Right, 2012, 2014); Breast biopsy (Right, 2017); and breast lumpectomy with sentinel node biopsy (Right, 2/13/2017).    Meds:    Current Outpatient Prescriptions:   •  amitriptyline (ELAVIL) 10 MG tablet, Take 10 mg by mouth Every Night., Disp: , Rfl:   •  b complex-C-folic acid 1 MG capsule, Take 1 capsule by mouth Daily., Disp: , Rfl:   •  calcium carbonate-cholecalciferol 500-400 MG-UNIT tablet tablet, Take 1 tablet by mouth Daily., Disp: , Rfl:   •  escitalopram (LEXAPRO) 10 MG tablet, Take 10 mg by mouth Daily., Disp: , Rfl:   •  HYDROcodone-acetaminophen (NORCO) 5-325 MG per tablet, Take 1-2 tablets by mouth Every 4 (Four) Hours As Needed (Pain)., Disp: 20 tablet, Rfl: 0  •  Lutein 10 MG tablet, Take  by mouth., Disp: , Rfl:   •  melatonin 5 MG tablet  tablet, Take 5 mg by mouth Every Night., Disp: , Rfl:   •  Multiple Vitamins-Minerals (MULTIVITAL PO), Take  by mouth., Disp: , Rfl:   •  NON FORMULARY, 1 capsule Daily. NHT Nerium, Disp: , Rfl:     Allergies:  has No Known Allergies.    Family History:  her family history includes Breast cancer (age of onset: 50) in her sister; Breast cancer (age of onset: 70) in her mother; Hearing loss in her father and mother; Hyperlipidemia in her father; Hypertension in her father; Kidney cancer in her sister.    Social History:  she  reports that she has never smoked. She does not have any smokeless tobacco history on file. She reports that she drinks about 1.2 oz of alcohol per week  She reports that she does not use illicit drugs.    Pertinent Findings on   Review of Systems - Oncology checked nothing on the 54 question inquiry sheet.    Vital Signs    There were no vitals filed for this visit.    Pertinent Findings on Problem Focused Exam:  Physical Exam  Well healed LOQ (8:00 radiant)  incision on the right breast. No palpable regional nodes.    Karnofsky Performance Status:  100 - Fully active, able to carry on all pre-disease performed without restriction    Impression: pT1A  N0   Mx  Cancer of the 9:00 radiant of the right breast.    Summary of Our Discussion :      We discussed the diagnosis, stage, standard of care in the [place]. We discussed the  intent of the treatment (curative, palliative, prophylactic). We discussed the role of radiation therapy in the treatment plan both in generality and in specific. With regard to the latter we discussed the logistics of treatment, the number of treatments, the side effects,  both probable and possible. We discussed potential after-effects,  both probable and possible. We discussed both intra-treatment and post-treatment coordination of care.     We discussed the planning process which will include immobilization devices required to assure high precision treatment and  we discussed the planning CT scan (which will not require administration of any contrast agent).  All questions answered.     We began the planning process today with manufacture of the immobilization devices and the planning scan. We intend to begin actual treatment at 3:30pm  on March 07.      Plan: I plan treatment by accelerated, h ypofractionated technique, giving the whole beast a dose of 266 cGy per day X 16 days; the operative bed and a margin of 10 mm a dose of 266 cGy per day X 18 days. 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      I appreciate being asked to see  Ms. Jaimie Jeffries in consultation.      Andrei Cohen MD         Today, I was with Ms.Jacque SABRINA Jeffries  from 3:00pm  until  3:45 pmof which  40  minutes was face to face  Of that face to face time, 40 minutes  was spent in counseling and coordination of care as indicated in the Summary of Our Discussion, above.

## 2017-03-02 PROCEDURE — 77263 THER RADIOLOGY TX PLNG CPLX: CPT

## 2017-03-02 PROCEDURE — 77295 3-D RADIOTHERAPY PLAN: CPT

## 2017-03-03 PROCEDURE — 77300 RADIATION THERAPY DOSE PLAN: CPT

## 2017-03-07 PROCEDURE — 77412 RADIATION TX DELIVERY LVL 3: CPT

## 2017-03-07 PROCEDURE — 77280 THER RAD SIMULAJ FIELD SMPL: CPT

## 2017-03-07 PROCEDURE — 77334 RADIATION TREATMENT AID(S): CPT

## 2017-03-07 PROCEDURE — 77417 THER RADIOLOGY PORT IMAGE(S): CPT

## 2017-03-07 PROCEDURE — 77427 RADIATION TX MANAGEMENT X5: CPT

## 2017-03-08 PROCEDURE — 77412 RADIATION TX DELIVERY LVL 3: CPT

## 2017-03-09 ENCOUNTER — RADIATION ONCOLOGY WEEKLY ASSESSMENT (OUTPATIENT)
Dept: RADIATION ONCOLOGY | Facility: HOSPITAL | Age: 62
End: 2017-03-09

## 2017-03-09 DIAGNOSIS — C50.811 MALIGNANT NEOPLASM OF OVERLAPPING SITES OF RIGHT FEMALE BREAST (HCC): Primary | ICD-10-CM

## 2017-03-09 PROCEDURE — 77412 RADIATION TX DELIVERY LVL 3: CPT

## 2017-03-09 PROCEDURE — 77336 RADIATION PHYSICS CONSULT: CPT

## 2017-03-09 RX ORDER — CEPHALEXIN 500 MG/1
500 CAPSULE ORAL 4 TIMES DAILY
Qty: 40 CAPSULE | Refills: 0 | Status: SHIPPED | OUTPATIENT
Start: 2017-03-09 | End: 2017-03-20

## 2017-03-09 NOTE — PROGRESS NOTES
Physician Weekly Management Note    Diagnosis:     Diagnosis Plan   1. Malignant neoplasm of overlapping sites of right female breast         RT Details:  fx 3 breast tangents    Notes on Treatment course, Films, Medical progress:  Pt requested to see md today due to increased redness over breast which started 24 hours ago, no fever or chills, on exam looks like cellulitis, start keflex today, see BB before tx tomorrow    Weekly Management:  Medication reviewed?   Yes  New medications given?   Yes  Problemlist reviewed?   Yes  Problem added?   No  Issues raised requiring referral to support services - task assigned to:  na    Technical aspects reviewed:  Weekly OBI approved?   Yes  Weekly port films approved?   Yes  Change requests noted on port film?   No  Patient setup and plan reviewed?   Yes    Chart Reviewed:  Continue current treatment plan?   Yes  Treatment plan change requested?   No  CBC reviewed?   No  Concurrent Chemo?   No    Objective     Toxicities:   none     Review of Systems   Constitutional: Negative.    HENT: Negative.    Skin: Positive for color change and rash.          There were no vitals filed for this visit.    Current Status 2/22/2017   ECOG score 1       Physical Exam   Constitutional: She appears well-developed and well-nourished.   Pulmonary/Chest:               Problem Summary List    Diagnosis:     Diagnosis Plan   1. Malignant neoplasm of overlapping sites of right female breast       Pathology:   breast    Past Medical History   Diagnosis Date   • Breast cancer      Right   • Depression    • PONV (postoperative nausea and vomiting)      PREFER SCOPE PATCH,ZOFRAN,PHENERGAN         Past Surgical History   Procedure Laterality Date   • Breast biopsy Right 2012, 2014     benign   • Breast biopsy Right 2017     malignant   • Breast lumpectomy with sentinel node biopsy Right 2/13/2017     Procedure: BREAST LUMPECTOMY WITH SENTINEL NODE BIOPSY AND NEEDLE LOCALIZATION;  Surgeon: Fred GARCIA  MD Denisha;  [place] [facility] [place] [facility] [place]  Service:          Current Outpatient Prescriptions on File Prior to Visit   Medication Sig Dispense Refill   • amitriptyline (ELAVIL) 10 MG tablet Take 10 mg by mouth Every Night.     • b complex-C-folic acid 1 MG capsule Take 1 capsule by mouth Daily.     • calcium carbonate-cholecalciferol 500-400 MG-UNIT tablet tablet Take 1 tablet by mouth Daily.     • escitalopram (LEXAPRO) 10 MG tablet Take 10 mg by mouth Daily.     • HYDROcodone-acetaminophen (NORCO) 5-325 MG per tablet Take 1-2 tablets by mouth Every 4 (Four) Hours As Needed (Pain). 20 tablet 0   • Lutein 10 MG tablet Take  by mouth.     • melatonin 5 MG tablet tablet Take 5 mg by mouth Every Night.     • Multiple Vitamins-Minerals (MULTIVITAL PO) Take  by mouth.     • NON FORMULARY 1 capsule Daily. NHT Nerium       No current facility-administered medications on file prior to visit.        No Known Allergies      Referring Provider:    No referring provider defined for this encounter.    Oncologist:  No primary care provider on file.      Seen and approved by:  Veronica Islas MD  03/09/2017

## 2017-03-10 ENCOUNTER — RADIATION ONCOLOGY WEEKLY ASSESSMENT (OUTPATIENT)
Dept: RADIATION ONCOLOGY | Facility: HOSPITAL | Age: 62
End: 2017-03-10

## 2017-03-10 DIAGNOSIS — C50.811 MALIGNANT NEOPLASM OF OVERLAPPING SITES OF RIGHT FEMALE BREAST (HCC): Primary | ICD-10-CM

## 2017-03-10 PROCEDURE — FACE2FACE

## 2017-03-10 NOTE — PROGRESS NOTES
3/10/2017    Jaimie Jeffries is a 61 y.o. female    There were no vitals filed for this visit.    Weekly Management:  Allergies reviewed?   Yes  Problemlist reviewed?   Yes  Medication reviewed?   Yes   New medications given? no  Problem added?   no  Issues raised requiring referral to support services:no    Technical aspects reviewed:  Weekly port films approved?   Yes  Weekly OBI imaging reviewed? Yes  Patient setup and plan reviewed?   Yes  Change requests noted on port film?   no    Chart Reviewed:  Treatment plan change requested?   no  Continue current treatment plan?   Yes  Concurrent Chemo?  no  CBC reviewed?   no    Toxicities:  None       Performance Status: 90 - Able to carry on normal activity; minor signs or symptoms of disease     Reason for Visit: No chief complaint on file.      Notes on Treatment course, Films, Medical progress: Right breast still erythematous and warm to touch; a change from the day we did the planning scan. I think her operative bed is infected. Continue Keflex. Return to see Veronica at 3:30pm on Monday for her to decide when we might resume the XRT. In the meantime, if she runs a fever or the firm area begins to point call Dr. Denny's covering MD for advice.         Seen and approved by:  Andrei Cohen MD  03/10/2017

## 2017-03-13 ENCOUNTER — RADIATION ONCOLOGY WEEKLY ASSESSMENT (OUTPATIENT)
Dept: RADIATION ONCOLOGY | Facility: HOSPITAL | Age: 62
End: 2017-03-13

## 2017-03-13 ENCOUNTER — TELEPHONE (OUTPATIENT)
Dept: MAMMOGRAPHY | Facility: CLINIC | Age: 62
End: 2017-03-13

## 2017-03-13 DIAGNOSIS — C50.811 MALIGNANT NEOPLASM OF OVERLAPPING SITES OF RIGHT FEMALE BREAST (HCC): Primary | ICD-10-CM

## 2017-03-13 PROCEDURE — FACE2FACE: Performed by: RADIOLOGY

## 2017-03-13 NOTE — PROGRESS NOTES
Physician Weekly Management Note    Diagnosis:     Diagnosis Plan   1. Malignant neoplasm of overlapping sites of right female breast         RT Details:  fx 3/16 right breast tangents    Notes on Treatment course, Films, Medical progress:  Pt still has erythema over entire breast with hard palpable seroma at 9 oclock position, she has been on keflex for past 4 days with no improvement.  Will hold xrt, I am concerned she has an infected seroma.  Will contact Dr. Wade office and see who is covering for him and try to get her in this afternoon or tomorrow morning asap    Weekly Management:  Medication reviewed?   Yes  New medications given?   No  Problemlist reviewed?   Yes  Problem added?   No  Issues raised requiring referral to support services - task assigned to:  na    Technical aspects reviewed:  Weekly OBI approved?   Yes  Weekly port films approved?   Yes  Change requests noted on port film?   No  Patient setup and plan reviewed?   Yes    Chart Reviewed:  Continue current treatment plan?   No  Treatment plan change requested?   No  CBC reviewed?   No  Concurrent Chemo?   No    Objective     Toxicities:   Cellulitis verus infected seroma     Review of Systems   Constitutional: Positive for chills and fatigue.   Musculoskeletal:        Pain in right breast   Skin: Positive for color change and rash.          There were no vitals filed for this visit.    Current Status 2/22/2017   ECOG score 1       Physical Exam   Constitutional: She appears well-developed and well-nourished.   Pulmonary/Chest:               Problem Summary List    Diagnosis:     Diagnosis Plan   1. Malignant neoplasm of overlapping sites of right female breast       Pathology:   ductal    Past Medical History   Diagnosis Date   • Breast cancer      Right   • Depression    • PONV (postoperative nausea and vomiting)      PREFER SCOPE PATCH,ZOFRAN,PHENERGAN         Past Surgical History   Procedure Laterality Date   • Breast biopsy Right  2012, 2014     benign   • Breast biopsy Right 2017     malignant   • Breast lumpectomy with sentinel node biopsy Right 2/13/2017     Procedure: BREAST LUMPECTOMY WITH SENTINEL NODE BIOPSY AND NEEDLE LOCALIZATION;  Surgeon: Fred Denny MD;  Location: [facility] [place] [facility] [place];  Service:          Current Outpatient Prescriptions on File Prior to Visit   Medication Sig Dispense Refill   • amitriptyline (ELAVIL) 10 MG tablet Take 10 mg by mouth Every Night.     • b complex-C-folic acid 1 MG capsule Take 1 capsule by mouth Daily.     • calcium carbonate-cholecalciferol 500-400 MG-UNIT tablet tablet Take 1 tablet by mouth Daily.     • cephalexin (KEFLEX) 500 MG capsule Take 1 capsule by mouth 4 (Four) Times a Day for 10 days. 40 capsule 0   • escitalopram (LEXAPRO) 10 MG tablet Take 10 mg by mouth Daily.     • HYDROcodone-acetaminophen (NORCO) 5-325 MG per tablet Take 1-2 tablets by mouth Every 4 (Four) Hours As Needed (Pain). 20 tablet 0   • Lutein 10 MG tablet Take  by mouth.     • melatonin 5 MG tablet tablet Take 5 mg by mouth Every Night.     • Multiple Vitamins-Minerals (MULTIVITAL PO) Take  by mouth.     • NON FORMULARY 1 capsule Daily. NHT Nerium       No current facility-administered medications on file prior to visit.        No Known Allergies      Referring Provider:    No referring provider defined for this encounter.    Oncologist:  No primary care provider on file.      Seen and approved by:  Veronica Islas MD  03/13/2017

## 2017-03-13 NOTE — TELEPHONE ENCOUNTER
Janneth called from Dr. Ordoñez's office to inform us that Jaimie has an infected seroma and needs to be seen asap. Dr. Foster was able to fit her into his schedule for tomorrow morning (3/14/17) at 0845 to be seen by him while Dr. Denny is away. -Ana Alvarez         Reviewed by Richa Garrett RN

## 2017-03-14 ENCOUNTER — OFFICE VISIT (OUTPATIENT)
Dept: SURGERY | Facility: CLINIC | Age: 62
End: 2017-03-14

## 2017-03-14 VITALS
BODY MASS INDEX: 30.4 KG/M2 | HEART RATE: 73 BPM | WEIGHT: 193.7 LBS | OXYGEN SATURATION: 94 % | SYSTOLIC BLOOD PRESSURE: 120 MMHG | HEIGHT: 67 IN | DIASTOLIC BLOOD PRESSURE: 79 MMHG

## 2017-03-14 DIAGNOSIS — N64.89 SEROMA OF BREAST: ICD-10-CM

## 2017-03-14 DIAGNOSIS — C50.511 MALIGNANT NEOPLASM OF LOWER-OUTER QUADRANT OF RIGHT FEMALE BREAST (HCC): Primary | ICD-10-CM

## 2017-03-14 PROCEDURE — 99024 POSTOP FOLLOW-UP VISIT: CPT | Performed by: SURGERY

## 2017-03-14 PROCEDURE — 87015 SPECIMEN INFECT AGNT CONCNTJ: CPT | Performed by: SURGERY

## 2017-03-14 PROCEDURE — 87070 CULTURE OTHR SPECIMN AEROBIC: CPT | Performed by: SURGERY

## 2017-03-14 PROCEDURE — 87205 SMEAR GRAM STAIN: CPT | Performed by: SURGERY

## 2017-03-14 RX ORDER — SULFAMETHOXAZOLE AND TRIMETHOPRIM 800; 160 MG/1; MG/1
1 TABLET ORAL 2 TIMES DAILY
Qty: 20 TABLET | Refills: 0 | Status: SHIPPED | OUTPATIENT
Start: 2017-03-14 | End: 2017-04-24 | Stop reason: SDDI

## 2017-03-14 NOTE — PROGRESS NOTES
Subjective   Jaimie Jeffries is a 61 y.o. female presents to the office for an infected seroma.    History of Present Illness     The patient was diagnosed with right breast cancer and underwent a needle localization right breast lumpectomy with sentinel lymph node biopsy on 2/13/2017.  She recovered well from surgery and started radiation one week ago.  After 2 doses of radiation she developed erythema of her breast.  She was started on Keflex and continued radiation.  The erythema progressed and radiation was held for the past 2 doses.  She was diagnosed with a possible infected seroma and presents for evaluation.    Review of Systems   Constitutional: Positive for fatigue. Negative for activity change, appetite change and fever.   Respiratory: Negative for chest tightness and shortness of breath.    Cardiovascular: Negative for chest pain and palpitations.   Gastrointestinal: Negative for abdominal pain, blood in stool, constipation, diarrhea, nausea and vomiting.   Genitourinary: Negative for dysuria and flank pain.   Neurological: Negative for dizziness and light-headedness.   Hematological: Negative for adenopathy. Does not bruise/bleed easily.   Psychiatric/Behavioral: Negative for agitation and confusion.     Past Medical History   Diagnosis Date   • Breast cancer      Right   • Depression    • Hx of radiation therapy    • PONV (postoperative nausea and vomiting)      PREFER SCOPE PATCH,ZOFRAN,PHENERGAN     Past Surgical History   Procedure Laterality Date   • Breast biopsy Right 2012, 2014     benign   • Breast biopsy Right 2017     malignant   • Breast lumpectomy with sentinel node biopsy Right 2/13/2017     Procedure: BREAST LUMPECTOMY WITH SENTINEL NODE BIOPSY AND NEEDLE LOCALIZATION;  Surgeon: Fred Denny MD;  Location: [facility] OR [facility] [place];  Service:      Family History   Problem Relation Age of Onset   • Breast cancer Mother 70   • Hearing loss Mother    • Breast cancer Sister 50   • Hearing loss  Father    • Hyperlipidemia Father    • Hypertension Father    • Kidney cancer Sister      Social History     Social History   • Marital status:      Spouse name: N/A   • Number of children: 4   • Years of education: College     Occupational History   • RN [employer]     Social History Main Topics   • Smoking status: Never Smoker   • Smokeless tobacco: Never Used   • Alcohol use 1.2 oz/week     2 Glasses of wine per week   • Drug use: No   • Sexual activity: Yes      Comment:      Other Topics Concern   • Not on file     Social History Narrative    Accompanied by friend Ryan       Objective   Physical Exam   Constitutional: She appears well-developed and well-nourished.   Pulmonary/Chest: Effort normal. No respiratory distress.   Right breast: There is erythema consistent with mastitis with a palpable seroma along the lateral aspect and induration of the skin.   Psychiatric: She has a normal mood and affect. Her behavior is normal. Judgment and thought content normal.       Procedure  The lateral right breast was prepped and draped in the standard surgical fashion.  An aspiration of the fluid was performed and sent for culture.  The patient tolerated procedure well.    Assessment/Plan       The primary encounter diagnosis was Malignant neoplasm of lower-outer quadrant of right female breast. A diagnosis of Seroma of breast was also pertinent to this visit.    The patient has an infected seroma that is preventing radiation therapy.  An aspiration of the infected seroma was performed in the office.  Cultures were sent.  She will follow-up on an as-needed basis.

## 2017-03-16 ENCOUNTER — RADIATION ONCOLOGY WEEKLY ASSESSMENT (OUTPATIENT)
Dept: RADIATION ONCOLOGY | Facility: HOSPITAL | Age: 62
End: 2017-03-16

## 2017-03-16 DIAGNOSIS — C50.811 MALIGNANT NEOPLASM OF OVERLAPPING SITES OF RIGHT FEMALE BREAST (HCC): Primary | ICD-10-CM

## 2017-03-16 PROCEDURE — FACE2FACE: Performed by: RADIOLOGY

## 2017-03-16 NOTE — PROGRESS NOTES
Physician Weekly Management Note    Diagnosis:     Diagnosis Plan   1. Malignant neoplasm of overlapping sites of right female breast         RT Details:  fx 3 tangents right breast   Notes on Treatment course, Films, Medical progress:  Ms. Hodges returns today to possibly resume xrt to right breast, she had signs of cellulitis last week so I started her on keflex, when she returned on Monday, the erythema was no better and the breast was warm and tender, she was seen Dr. Anirudh Foster on Tuesday who was the covering surgeon for Dr. Denny bc I was concerned she may have an infected seroma.  He aliya off 30cc from the seroma per the patients report and sent for cultures.  He also started her on bactrim.  She returns today and the right breast is , erythematous and warm to touch.  I have placed a call to Dr. Foster  And waiting to hear back.  May need to consider ultrasound guided aspiration.  And different abx. She denies fever, chills or malaise    I spoke with Dr. Anirudh Foster and he agreed with ultrasound guided aspiration which we have scheduled here at [facility] tomorrow morning. She is aware of the appointment.    Weekly Management:  Medication reviewed?   Yes  New medications given?   No  Problemlist reviewed?   Yes  Problem added?   No  Issues raised requiring referral to support services - task assigned to:  na    Technical aspects reviewed:  Weekly OBI approved?   Yes  Weekly port films approved?   Yes  Change requests noted on port film?   No  Patient setup and plan reviewed?   No    Chart Reviewed:  Continue current treatment plan?   No  Treatment plan change requested?   No  CBC reviewed?   No  Concurrent Chemo?   No    Objective     Toxicities:   cellulitis     Review of Systems   Constitutional: Negative.    HENT: Negative.    Skin: Positive for color change.          There were no vitals filed for this visit.    Current Status 2/22/2017   ECOG score 1       Physical Exam      Constitutional: She appears well-developed and well-nourished.   Pulmonary/Chest:               Problem Summary List    Diagnosis:     Diagnosis Plan   1. Malignant neoplasm of overlapping sites of right female breast       Pathology:   Breast adenoca    Past Medical History   Diagnosis Date   • Breast cancer      Right   • Depression    • PONV (postoperative nausea and vomiting)      PREFER SCOPE PATCH,ZOFRAN,PHENERGAN         Past Surgical History   Procedure Laterality Date   • Breast biopsy Right 2012, 2014     benign   • Breast biopsy Right 2017     malignant   • Breast lumpectomy with sentinel node biopsy Right 2/13/2017     Procedure: BREAST LUMPECTOMY WITH SENTINEL NODE BIOPSY AND NEEDLE LOCALIZATION;  Surgeon: Fred Denny MD;  Location: [facility] OR [facility] [place];  Service:          Current Outpatient Prescriptions on File Prior to Visit   Medication Sig Dispense Refill   • amitriptyline (ELAVIL) 10 MG tablet Take 10 mg by mouth Every Night.     • b complex-C-folic acid 1 MG capsule Take 1 capsule by mouth Daily.     • calcium carbonate-cholecalciferol 500-400 MG-UNIT tablet tablet Take 1 tablet by mouth Daily.     • cephalexin (KEFLEX) 500 MG capsule Take 1 capsule by mouth 4 (Four) Times a Day for 10 days. 40 capsule 0   • escitalopram (LEXAPRO) 10 MG tablet Take 10 mg by mouth Daily.     • HYDROcodone-acetaminophen (NORCO) 5-325 MG per tablet Take 1-2 tablets by mouth Every 4 (Four) Hours As Needed (Pain). 20 tablet 0   • Lutein 10 MG tablet Take  by mouth.     • melatonin 5 MG tablet tablet Take 5 mg by mouth Every Night.     • Multiple Vitamins-Minerals (MULTIVITAL PO) Take  by mouth.     • NON FORMULARY 1 capsule Daily. NHT Nerium     • sulfamethoxazole-trimethoprim (BACTRIM DS,SEPTRA DS) 800-160 MG per tablet Take 1 tablet by mouth 2 (Two) Times a Day. 20 tablet 0     No current facility-administered medications on file prior to visit.        No Known Allergies      Referring Provider:    No referring  provider defined for this encounter.    Oncologist:  No primary care provider on file.      Seen and approved by:  Veronica Islas MD  03/16/2017

## 2017-03-17 ENCOUNTER — HOSPITAL ENCOUNTER (OUTPATIENT)
Dept: RADIOLOGY | Facility: HOSPITAL | Age: 62
Discharge: HOME | End: 2017-03-17
Attending: RADIOLOGY

## 2017-03-17 ENCOUNTER — HOSPITAL ENCOUNTER (OUTPATIENT)
Dept: ULTRASOUND IMAGING | Facility: HOSPITAL | Age: 62
Discharge: HOME | End: 2017-03-17
Attending: RADIOLOGY | Admitting: RADIOLOGY

## 2017-03-17 VITALS
SYSTOLIC BLOOD PRESSURE: 120 MMHG | HEART RATE: 71 BPM | TEMPERATURE: 97.9 F | OXYGEN SATURATION: 97 % | DIASTOLIC BLOOD PRESSURE: 80 MMHG | RESPIRATION RATE: 16 BRPM | HEIGHT: 67 IN | BODY MASS INDEX: 29.82 KG/M2 | WEIGHT: 190 LBS

## 2017-03-17 DIAGNOSIS — C50.811 MALIGNANT NEOPLASM OF OVERLAPPING SITES OF RIGHT FEMALE BREAST (HCC): ICD-10-CM

## 2017-03-17 LAB
BACTERIA FLD CULT: NORMAL
GRAM STN SPEC: NORMAL
GRAM STN SPEC: NORMAL

## 2017-03-17 PROCEDURE — 76942 ECHO GUIDE FOR BIOPSY: CPT

## 2017-03-17 PROCEDURE — 87070 CULTURE OTHR SPECIMN AEROBIC: CPT | Performed by: RADIOLOGY

## 2017-03-17 PROCEDURE — 87205 SMEAR GRAM STAIN: CPT | Performed by: RADIOLOGY

## 2017-03-17 PROCEDURE — 87015 SPECIMEN INFECT AGNT CONCNTJ: CPT | Performed by: RADIOLOGY

## 2017-03-17 RX ADMIN — SODIUM BICARBONATE 7 ML: 84 INJECTION, SOLUTION INTRAVENOUS at 10:41

## 2017-03-20 ENCOUNTER — OFFICE VISIT (OUTPATIENT)
Dept: INTERNAL MEDICINE | Facility: CLINIC | Age: 62
End: 2017-03-20

## 2017-03-20 ENCOUNTER — TELEPHONE (OUTPATIENT)
Dept: RADIATION ONCOLOGY | Facility: HOSPITAL | Age: 62
End: 2017-03-20

## 2017-03-20 VITALS
SYSTOLIC BLOOD PRESSURE: 120 MMHG | BODY MASS INDEX: 30.48 KG/M2 | HEIGHT: 67 IN | WEIGHT: 194.2 LBS | HEART RATE: 76 BPM | TEMPERATURE: 97.8 F | DIASTOLIC BLOOD PRESSURE: 76 MMHG | OXYGEN SATURATION: 95 % | RESPIRATION RATE: 16 BRPM

## 2017-03-20 DIAGNOSIS — J02.9 SORE THROAT: Primary | ICD-10-CM

## 2017-03-20 LAB
EXPIRATION DATE: NORMAL
INTERNAL CONTROL: NORMAL
Lab: NORMAL
S PYO AG THROAT QL: NEGATIVE

## 2017-03-20 PROCEDURE — 99213 OFFICE O/P EST LOW 20 MIN: CPT | Performed by: INTERNAL MEDICINE

## 2017-03-20 PROCEDURE — 87880 STREP A ASSAY W/OPTIC: CPT | Performed by: INTERNAL MEDICINE

## 2017-03-20 NOTE — TELEPHONE ENCOUNTER
"Ms Painter calls today to say she has had only 3 of her scheduled radiation treatments due to needing her breast aspirated. She states her last aspiration was done under US on Friday  She was told to leave her breast bound until her return for radiation  treatment today.   She states on Friday she went out of town to visit family and began feeling sick with a sore throat. She returned to [place] yesterday and felt worse so saw a \"doc in the box\" and was told she had strept throat.   Last week she had been on Keflex and yesterday she was started on Zuprex for strept throat. She states she is no better today and concerned if she should come in for treatment and thinks she may need IV antibiotics for her throat since she is \"immunocompromised\" from her radiation treatments.   She denies any elevated temperature and in regards to breast she took binding off while we spoke on the phone. She states the breast does not appear red however it does feel firm to touch. Patient advised to contact her PMD if sore throat persists or if she has increase in temp.   Patient will remain off today and we will follow up tomorrow regarding her breast after she has left the binding off.    "

## 2017-03-20 NOTE — PROGRESS NOTES
Subjective   Jaimie Jeffries is a 61 y.o. female.     Chief Complaint   Patient presents with   • Sore Throat     since Friday night, went to  dx w/ strep, still hurting, not getting better         Sore Throat    This is a new problem. The current episode started in the past 7 days. The problem has been unchanged. Neither side of throat is experiencing more pain than the other. There has been no fever. The pain is moderate. Associated symptoms include headaches and trouble swallowing. Pertinent negatives include no abdominal pain, congestion, coughing, diarrhea, ear pain, shortness of breath or vomiting. She has tried acetaminophen and NSAIDs for the symptoms. The treatment provided mild relief.        The following portions of the patient's history were reviewed and updated as appropriate: allergies, current medications, past social history and problem list.    Outpatient Prescriptions Marked as Taking for the 3/20/17 encounter (Office Visit) with Rj Crawford MD   Medication Sig Dispense Refill   • amitriptyline (ELAVIL) 10 MG tablet Take 10 mg by mouth Every Night.     • b complex-C-folic acid 1 MG capsule Take 1 capsule by mouth Daily.     • calcium carbonate-cholecalciferol 500-400 MG-UNIT tablet tablet Take 1 tablet by mouth Daily.     • Cefixime (SUPRAX) 400 MG tablet Take 1 tablet by mouth Daily. 10 capsule 0   • escitalopram (LEXAPRO) 10 MG tablet Take 10 mg by mouth Daily.     • HYDROcodone-acetaminophen (NORCO) 5-325 MG per tablet Take 1-2 tablets by mouth Every 4 (Four) Hours As Needed (Pain). 20 tablet 0   • Lutein 10 MG tablet Take  by mouth.     • melatonin 5 MG tablet tablet Take 5 mg by mouth Every Night.     • Multiple Vitamins-Minerals (MULTIVITAL PO) Take  by mouth.     • NON FORMULARY 1 capsule Daily. NHT Nerium     • sulfamethoxazole-trimethoprim (BACTRIM DS,SEPTRA DS) 800-160 MG per tablet Take 1 tablet by mouth 2 (Two) Times a Day. 20 tablet 0       Review of Systems   Constitutional:  Negative for chills, fatigue and fever.   HENT: Positive for sore throat and trouble swallowing. Negative for congestion, ear pain, postnasal drip, rhinorrhea, sinus pressure and voice change.    Respiratory: Negative for cough, shortness of breath and wheezing.    Gastrointestinal: Negative for abdominal pain, diarrhea and vomiting.   Neurological: Positive for headaches.       Objective   Vitals:    03/20/17 1326   BP: 120/76   Pulse: 76   Resp: 16   Temp: 97.8 °F (36.6 °C)   SpO2: 95%      Last Weight    03/20/17  1326   Weight: 194 lb 3.2 oz (88.1 kg)    [unfilled]  Body mass index is 30.42 kg/(m^2).      Physical Exam   Constitutional: She appears well-developed and well-nourished.   HENT:   Head: Normocephalic and atraumatic.   Right Ear: External ear normal.   Left Ear: External ear normal.   Nose: Nose normal.   Mouth/Throat: Oropharynx is clear and moist.   Eyes: Conjunctivae are normal. Pupils are equal, round, and reactive to light.   Pulmonary/Chest: Effort normal and breath sounds normal. No respiratory distress. She has no wheezes. She has no rales.   Skin: Skin is warm and dry.         Problem List Items Addressed This Visit     None      Visit Diagnoses     Sore throat    -  Primary    Relevant Orders    POCT rapid strep A (Completed)        Assessment/Plan   One week ago she began with a postop last mastitis or seroma.  She was placed on Bactrim and Keflex.  3 days ago she hurt she developed a sore throat.  Seen at [facility] yesterday.  Was felt to have a strep throat clinically.  She was switched to Suprax once daily.  She has one more dose of Bactrim.  I think continuing the Suprax is fine.  It will cover H.  Flu.  She's got some inflammation of the uvula but otherwise her throat is not that impressive.  I do not think this is yeast.  We'll see how she does on the Suprax.  Observe.  Lozenges when necessary.  Tylenol when necessary.         Dragon disclaimer:   Much of this  encounter note is an electronic transcription/translation of spoken language to printed text. The electronic translation of spoken language may permit erroneous, or at times, nonsensical words or phrases to be inadvertently transcribed; Although I have reviewed the note for such errors, some may still exist.

## 2017-03-21 ENCOUNTER — OFFICE VISIT (OUTPATIENT)
Dept: MAMMOGRAPHY | Facility: CLINIC | Age: 62
End: 2017-03-21

## 2017-03-21 ENCOUNTER — TELEPHONE (OUTPATIENT)
Dept: RADIATION ONCOLOGY | Facility: HOSPITAL | Age: 62
End: 2017-03-21

## 2017-03-21 VITALS
DIASTOLIC BLOOD PRESSURE: 65 MMHG | OXYGEN SATURATION: 96 % | TEMPERATURE: 97.6 F | HEART RATE: 79 BPM | SYSTOLIC BLOOD PRESSURE: 112 MMHG

## 2017-03-21 DIAGNOSIS — C50.811 MALIGNANT NEOPLASM OF OVERLAPPING SITES OF RIGHT FEMALE BREAST (HCC): Primary | ICD-10-CM

## 2017-03-21 LAB
BACTERIA FLD CULT: NORMAL
GRAM STN SPEC: NORMAL
GRAM STN SPEC: NORMAL

## 2017-03-21 PROCEDURE — 77412 RADIATION TX DELIVERY LVL 3: CPT

## 2017-03-21 PROCEDURE — 99024 POSTOP FOLLOW-UP VISIT: CPT | Performed by: SURGERY

## 2017-03-21 NOTE — PROGRESS NOTES
Chief Complaint: Jaimie Jeffries is a  61 y.o. female, initially referred by No ref. provider found , who is here today for a postoperative visit.    History of Present Illness:  In the interim,Jaimie Jeffries has seen Dr. Foster to evaluate for a possible infected hematoma.  He aspirated about 30 cc of fluid and cultured it which did not grow anything.  She then had an ultrasound-guided aspiration was removed 20 cc of straw-colored fluid.  She has been on Keflex, Bactrim, and now Suprax.     Denies fever or chills.      Current Outpatient Prescriptions:   •  amitriptyline (ELAVIL) 10 MG tablet, Take 10 mg by mouth Every Night., Disp: , Rfl:   •  b complex-C-folic acid 1 MG capsule, Take 1 capsule by mouth Daily., Disp: , Rfl:   •  calcium carbonate-cholecalciferol 500-400 MG-UNIT tablet tablet, Take 1 tablet by mouth Daily., Disp: , Rfl:   •  Cefixime (SUPRAX) 400 MG tablet, Take 1 tablet by mouth Daily., Disp: 10 capsule, Rfl: 0  •  escitalopram (LEXAPRO) 10 MG tablet, Take 10 mg by mouth Daily., Disp: , Rfl:   •  HYDROcodone-acetaminophen (NORCO) 5-325 MG per tablet, Take 1-2 tablets by mouth Every 4 (Four) Hours As Needed (Pain)., Disp: 20 tablet, Rfl: 0  •  Lutein 10 MG tablet, Take  by mouth., Disp: , Rfl:   •  melatonin 5 MG tablet tablet, Take 5 mg by mouth Every Night., Disp: , Rfl:   •  Multiple Vitamins-Minerals (MULTIVITAL PO), Take  by mouth., Disp: , Rfl:   •  NON FORMULARY, 1 capsule Daily. NHT Nerium, Disp: , Rfl:   •  sulfamethoxazole-trimethoprim (BACTRIM DS,SEPTRA DS) 800-160 MG per tablet, Take 1 tablet by mouth 2 (Two) Times a Day., Disp: 20 tablet, Rfl: 0  Physical examination-the lumpectomy incision in the lateral aspect the breast appears to be healing well with some firmness as expected.  She has some mild redness and firmness in the upper outer quadrant in the area where the methylene blue was injected.  Assessment:  Right breast cancer status post breast conserving surgery.  At this  point in time, I do not think she has active infection.  The only area I see right now is local reaction to the methylene blue that was injected    Plan:  I think that she can safely proceed with radiation.

## 2017-03-21 NOTE — TELEPHONE ENCOUNTER
"Contacted patient regarding her breast seroma. She states she thinks it is \" a little better\" but still firm. Contacted Dr. Denny who can see her this am to evaluate . Patient informed of appointment and is able to go.  "

## 2017-03-22 PROCEDURE — 77412 RADIATION TX DELIVERY LVL 3: CPT

## 2017-03-23 PROCEDURE — 77427 RADIATION TX MANAGEMENT X5: CPT

## 2017-03-23 PROCEDURE — 77412 RADIATION TX DELIVERY LVL 3: CPT

## 2017-03-23 PROCEDURE — 77417 THER RADIOLOGY PORT IMAGE(S): CPT

## 2017-03-24 ENCOUNTER — RADIATION ONCOLOGY WEEKLY ASSESSMENT (OUTPATIENT)
Dept: RADIATION ONCOLOGY | Facility: HOSPITAL | Age: 62
End: 2017-03-24

## 2017-03-24 DIAGNOSIS — C50.811 MALIGNANT NEOPLASM OF OVERLAPPING SITES OF RIGHT FEMALE BREAST (HCC): Primary | ICD-10-CM

## 2017-03-24 PROCEDURE — 77263 THER RADIOLOGY TX PLNG CPLX: CPT

## 2017-03-24 PROCEDURE — FACE2FACE

## 2017-03-24 PROCEDURE — 77290 THER RAD SIMULAJ FIELD CPLX: CPT

## 2017-03-24 NOTE — PROGRESS NOTES
3/24/2017    Jaimie Jeffries is a 61 y.o. female    There were no vitals filed for this visit.    Weekly Management:  Allergies reviewed?   Yes  Problemlist reviewed?   Yes  Medication reviewed?   Yes   New medications given? no  Problem added?   no  Issues raised requiring referral to support services:no    Technical aspects reviewed:  Weekly port films approved?   Yes  Weekly OBI imaging reviewed? Yes  Patient setup and plan reviewed?   Yes  Change requests noted on port film?   no    Chart Reviewed:  Treatment plan change requested?   no  Continue current treatment plan?   Yes  Concurrent Chemo?  no  CBC reviewed?   no    Toxicities:   None. She hasn't had enough treatments     Performance Status: 100 - Fully active, able to carry on all pre-disease performed without restriction    Reason for Visit: No chief complaint on file.      Notes on Treatment course, Films, Medical progress: After the aspirations her breast size and contour has changed sufficient that we need to rescan and replan. Not to be treated today nor next week until I have approved the new plan.         Seen and approved by:  Andrei Cohen MD  03/24/2017

## 2017-03-27 PROCEDURE — 77300 RADIATION THERAPY DOSE PLAN: CPT

## 2017-03-27 PROCEDURE — 77295 3-D RADIOTHERAPY PLAN: CPT

## 2017-03-27 PROCEDURE — 77412 RADIATION TX DELIVERY LVL 3: CPT

## 2017-03-27 PROCEDURE — 77334 RADIATION TREATMENT AID(S): CPT

## 2017-03-27 PROCEDURE — 77417 THER RADIOLOGY PORT IMAGE(S): CPT

## 2017-03-28 PROCEDURE — 77412 RADIATION TX DELIVERY LVL 3: CPT

## 2017-03-29 PROCEDURE — 77412 RADIATION TX DELIVERY LVL 3: CPT

## 2017-03-29 PROCEDURE — 77300 RADIATION THERAPY DOSE PLAN: CPT

## 2017-03-29 PROCEDURE — 77280 THER RAD SIMULAJ FIELD SMPL: CPT

## 2017-03-29 PROCEDURE — 77417 THER RADIOLOGY PORT IMAGE(S): CPT

## 2017-03-29 PROCEDURE — 77334 RADIATION TREATMENT AID(S): CPT

## 2017-03-30 ENCOUNTER — RADIATION ONCOLOGY WEEKLY ASSESSMENT (OUTPATIENT)
Dept: RADIATION ONCOLOGY | Facility: HOSPITAL | Age: 62
End: 2017-03-30

## 2017-03-30 VITALS
RESPIRATION RATE: 16 BRPM | DIASTOLIC BLOOD PRESSURE: 90 MMHG | TEMPERATURE: 98.3 F | OXYGEN SATURATION: 96 % | SYSTOLIC BLOOD PRESSURE: 131 MMHG | HEART RATE: 74 BPM

## 2017-03-30 DIAGNOSIS — C50.811 MALIGNANT NEOPLASM OF OVERLAPPING SITES OF RIGHT FEMALE BREAST (HCC): Primary | ICD-10-CM

## 2017-03-30 PROCEDURE — 77412 RADIATION TX DELIVERY LVL 3: CPT

## 2017-03-30 PROCEDURE — 77336 RADIATION PHYSICS CONSULT: CPT

## 2017-03-30 NOTE — PROGRESS NOTES
3/30/2017    Jaimie Jeffries is a 61 y.o. female    Vitals:    03/30/17 1559   BP: 131/90   Pulse: 74   Resp: 16   Temp: 98.3 °F (36.8 °C)   SpO2: 96%       Weekly Management:  Allergies reviewed?   Yes  Problemlist reviewed?   Yes  Medication reviewed?   Yes   New medications given? no  Problem added?   no  Issues raised requiring referral to support services:no    Technical aspects reviewed:  Weekly port films approved?   Yes  Weekly OBI imaging reviewed? Yes  Patient setup and plan reviewed?   Yes  Change requests noted on port film?   no    Chart Reviewed:  Treatment plan change requested?   no  Continue current treatment plan?   Yes  Concurrent Chemo?  no  CBC reviewed?   no    Toxicities:  Faint erythema     Performance Status: 100 - Fully active, able to carry on all pre-disease performed without restriction    Reason for Visit: Radiation (10/18)      Notes on Treatment course, Films, Medical progress: Doing well. Finish 04/11 and return here prn.         Seen and approved by:  Andrei Cohen MD  03/30/2017

## 2017-03-31 PROCEDURE — 77412 RADIATION TX DELIVERY LVL 3: CPT

## 2017-03-31 PROCEDURE — 77427 RADIATION TX MANAGEMENT X5: CPT

## 2017-04-01 ENCOUNTER — APPOINTMENT (OUTPATIENT)
Dept: RADIATION ONCOLOGY | Facility: HOSPITAL | Age: 62
End: 2017-04-01

## 2017-04-03 PROCEDURE — 77412 RADIATION TX DELIVERY LVL 3: CPT

## 2017-04-04 PROCEDURE — 77412 RADIATION TX DELIVERY LVL 3: CPT

## 2017-04-04 PROCEDURE — 77417 THER RADIOLOGY PORT IMAGE(S): CPT

## 2017-04-05 PROCEDURE — 77412 RADIATION TX DELIVERY LVL 3: CPT

## 2017-04-05 PROCEDURE — 77417 THER RADIOLOGY PORT IMAGE(S): CPT

## 2017-04-06 ENCOUNTER — RADIATION ONCOLOGY WEEKLY ASSESSMENT (OUTPATIENT)
Dept: RADIATION ONCOLOGY | Facility: HOSPITAL | Age: 62
End: 2017-04-06

## 2017-04-06 VITALS
HEART RATE: 68 BPM | RESPIRATION RATE: 16 BRPM | SYSTOLIC BLOOD PRESSURE: 138 MMHG | TEMPERATURE: 97.6 F | DIASTOLIC BLOOD PRESSURE: 92 MMHG | OXYGEN SATURATION: 97 %

## 2017-04-06 DIAGNOSIS — C50.811 MALIGNANT NEOPLASM OF OVERLAPPING SITES OF RIGHT FEMALE BREAST (HCC): Primary | ICD-10-CM

## 2017-04-06 PROCEDURE — 77412 RADIATION TX DELIVERY LVL 3: CPT

## 2017-04-06 PROCEDURE — 77336 RADIATION PHYSICS CONSULT: CPT

## 2017-04-06 NOTE — PROGRESS NOTES
4/6/2017    Jaimie Jeffries is a 61 y.o. female    Vitals:    04/06/17 1601   BP: 138/92   Pulse: 68   Resp: 16   Temp: 97.6 °F (36.4 °C)   SpO2: 97%       Weekly Management:  Allergies reviewed?   Yes  Problemlist reviewed?   Yes  Medication reviewed?   Yes   New medications given? no  Problem added?   no  Issues raised requiring referral to support services:no    Technical aspects reviewed:  Weekly port films approved?   Yes  Weekly OBI imaging reviewed? Yes  Patient setup and plan reviewed?   Yes  Change requests noted on port film?   no    Chart Reviewed:  Treatment plan change requested?   no  Continue current treatment plan?   Yes  Concurrent Chemo?  no  CBC reviewed?   no    Toxicities:   Faint erythema     Performance Status: 100 - Fully active, able to carry on all pre-disease performed without restriction    Reason for Visit: Radiation (15/18)      Notes on Treatment course, Films, Medical progress: Doing great. Finish 04/11 and has appointments with Slade Denny and Negro.Return here prn.         Seen and approved by:  Andrei Cohen MD  04/06/2017

## 2017-04-07 PROCEDURE — 77412 RADIATION TX DELIVERY LVL 3: CPT

## 2017-04-07 PROCEDURE — 77427 RADIATION TX MANAGEMENT X5: CPT

## 2017-04-10 PROCEDURE — 77412 RADIATION TX DELIVERY LVL 3: CPT

## 2017-04-11 PROCEDURE — 77412 RADIATION TX DELIVERY LVL 3: CPT

## 2017-04-11 PROCEDURE — 77336 RADIATION PHYSICS CONSULT: CPT

## 2017-04-19 DIAGNOSIS — C50.811 MALIGNANT NEOPLASM OF OVERLAPPING SITES OF RIGHT FEMALE BREAST (HCC): Primary | ICD-10-CM

## 2017-04-24 ENCOUNTER — APPOINTMENT (OUTPATIENT)
Dept: LAB | Facility: HOSPITAL | Age: 62
End: 2017-04-24

## 2017-04-24 ENCOUNTER — OFFICE VISIT (OUTPATIENT)
Dept: ONCOLOGY | Facility: CLINIC | Age: 62
End: 2017-04-24

## 2017-04-24 VITALS
WEIGHT: 196.8 LBS | HEIGHT: 67 IN | TEMPERATURE: 99.3 F | BODY MASS INDEX: 30.89 KG/M2 | RESPIRATION RATE: 16 BRPM | HEART RATE: 76 BPM | SYSTOLIC BLOOD PRESSURE: 130 MMHG | DIASTOLIC BLOOD PRESSURE: 80 MMHG

## 2017-04-24 DIAGNOSIS — M85.80 OSTEOPENIA: ICD-10-CM

## 2017-04-24 DIAGNOSIS — C50.811 MALIGNANT NEOPLASM OF OVERLAPPING SITES OF RIGHT FEMALE BREAST (HCC): Primary | ICD-10-CM

## 2017-04-24 PROCEDURE — 99214 OFFICE O/P EST MOD 30 MIN: CPT | Performed by: INTERNAL MEDICINE

## 2017-04-24 RX ORDER — ANASTROZOLE 1 MG/1
1 TABLET ORAL DAILY
Qty: 90 TABLET | Refills: 3 | Status: SHIPPED | OUTPATIENT
Start: 2017-04-24 | End: 2018-04-12

## 2017-04-24 NOTE — PROGRESS NOTES
Subjective     CHIEF COMPLAINT:      Right breast cancer    HISTORY OF PRESENT ILLNESS:     Jaimie Jeffries is a 61 y.o. female patient with the above medical history.  She returns today for follow-up reporting that she completed the radiation therapy on 4/11/17.  There was an interruption in the radiation due to development of pain in the breast and she was found to have a seroma that was aspirated on 3/17/17 under ultrasound guidance.       Past Medical History:   Diagnosis Date   • Breast cancer     Right   • Depression    • Hx of radiation therapy    • PONV (postoperative nausea and vomiting)     PREFER SCOPE PATCH,ZOFRAN,PHENERGAN       Past Surgical History:   Procedure Laterality Date   • BREAST BIOPSY Right 2012, 2014    benign   • BREAST BIOPSY Right 2017    malignant   • BREAST LUMPECTOMY WITH SENTINEL NODE BIOPSY Right 2/13/2017    Procedure: BREAST LUMPECTOMY WITH SENTINEL NODE BIOPSY AND NEEDLE LOCALIZATION;  Surgeon: Fred Denny MD;  Location: [facility] OR [facility] [place];  Service:        Cancer-related family history includes Breast cancer (age of onset: 50) in her sister; Breast cancer (age of onset: 70) in her mother; Kidney cancer in her sister.  Social History   Substance Use Topics   • Smoking status: Never Smoker   • Smokeless tobacco: Never Used   • Alcohol use 1.2 oz/week     2 Glasses of wine per week       MEDICATIONS:    Current Outpatient Prescriptions:   •  amitriptyline (ELAVIL) 10 MG tablet, Take 10 mg by mouth Every Night., Disp: , Rfl:   •  b complex-C-folic acid 1 MG capsule, Take 1 capsule by mouth Daily., Disp: , Rfl:   •  calcium carbonate-cholecalciferol 500-400 MG-UNIT tablet tablet, Take 1 tablet by mouth Daily., Disp: , Rfl:   •  Cefixime (SUPRAX) 400 MG tablet, Take 1 tablet by mouth Daily., Disp: 10 capsule, Rfl: 0  •  escitalopram (LEXAPRO) 10 MG tablet, Take 10 mg by mouth Daily., Disp: , Rfl:   •  Lutein 10 MG tablet, Take  by mouth., Disp: , Rfl:   •  melatonin 5 MG tablet  "tablet, Take 5 mg by mouth Every Night., Disp: , Rfl:   •  Multiple Vitamins-Minerals (MULTIVITAL PO), Take  by mouth., Disp: , Rfl:   •  NON FORMULARY, 1 capsule Daily. NHT Nerium, Disp: , Rfl:     ALLERGIES:  No Known Allergies    REVIEW OF SYSTEMS:  GENERAL: No fever or chills. No weight change.   SKIN: Redness of the breast secondary to radiation, improving  HEME/LYMPH: No anemia, easy bruisability or enlarged lymph nodes.  RESPIRATORY: No cough, shortness of breath, hemoptysis or wheezing.  CVS: No chest pain, palpitations or lower extremity swelling.  GI: No abdominal pain, nausea, vomiting, constipation, diarrhea, melena or hematochezia.  : No dysuria, hematuria or increased frequency.   MUSCULOSKELETAL: No bone pain or joint stiffness.  NEUROLOGICAL: No dizziness, global weakness, loss of consciousness or seizures.  PSYCHIATRIC: No anxiety, mood changes.  Has history of mild depression but it is under good control.    Objective   VITAL SIGNS:     Vitals:    04/24/17 1605   BP: 130/80   Pulse: 76   Resp: 16   Temp: 99.3 °F (37.4 °C)   Weight: 196 lb 12.8 oz (89.3 kg)   Height: 66.73\" (169.5 cm)   PainSc: 0-No pain     Body mass index is 31.07 kg/(m^2).     Wt Readings from Last 3 Encounters:   04/24/17 196 lb 12.8 oz (89.3 kg)   03/20/17 194 lb 3.2 oz (88.1 kg)   03/19/17 190 lb (86.2 kg)       PHYSICAL EXAMINATION:  Not performed today    DIAGNOSTIC DATA:     Bone density from 10/12/16 revealed normal density in the lumbar spine.  There was evidence of osteopenia in the left hip.    Assessment/Plan   1. Right breast cancer at 9 o'clock position, invasive ductal carcinoma, grade 2 with associated ductal carcinoma in situ. The maximum dimension was 4 mm. She is status post lumpectomy and sentinel lymph node biopsy on 2/13/17. Pathology examination of 3 lymph nodes was negative. She had stage IA disease (T1a, N0, M0). The tumor is ER and MI positive at 90% and HER-2/ivy negative.  Based on the small size of " the cancer, adjuvant chemotherapy was  not indicated and was not recommended.  She completed radiation therapy on 4/11/17.      I recommended adjuvant hormonal therapy using an aromatase inhibitor for 5 years.  Discussed and she side effects including arthralgia/arthritis, hot flashes, decrease in the bone density as well as worsening of her depression.  Her depression is mild and is under good control.  She has mild osteopenia as per the last bone density from 10/12/16 and we discussed the importance of activity and adequate calcium and vitamin D levels (had a normal vitamin D level on 2/22/17).  I explained to her that a repeat bone density is done 2 years after the last study and if there is worsening, we would consider treatment with Prolia versus Fosamax.     2. History of breast cancer in the patient's mother and sister. The sister was diagnosed in her 40s and received chemotherapy and stem cell transplantation. Her sister recently had genetic testing that was negative.    The patient will be started on Arimidex.  We will schedule her to return in 3 months with a CBC and CMP.        Sangeeta Tom MD  04/24/17

## 2017-05-02 ENCOUNTER — OFFICE VISIT (OUTPATIENT)
Dept: MAMMOGRAPHY | Facility: CLINIC | Age: 62
End: 2017-05-02

## 2017-05-02 VITALS
SYSTOLIC BLOOD PRESSURE: 118 MMHG | DIASTOLIC BLOOD PRESSURE: 72 MMHG | TEMPERATURE: 97.5 F | HEART RATE: 76 BPM | OXYGEN SATURATION: 94 %

## 2017-05-02 DIAGNOSIS — C50.811 MALIGNANT NEOPLASM OF OVERLAPPING SITES OF RIGHT FEMALE BREAST (HCC): Primary | ICD-10-CM

## 2017-05-02 PROCEDURE — 99024 POSTOP FOLLOW-UP VISIT: CPT | Performed by: SURGERY

## 2017-05-31 ENCOUNTER — TELEPHONE (OUTPATIENT)
Dept: OTHER | Facility: HOSPITAL | Age: 62
End: 2017-05-31

## 2017-06-01 ENCOUNTER — APPOINTMENT (OUTPATIENT)
Dept: OTHER | Facility: HOSPITAL | Age: 62
End: 2017-06-01

## 2017-06-08 ENCOUNTER — TELEPHONE (OUTPATIENT)
Dept: OTHER | Facility: HOSPITAL | Age: 62
End: 2017-06-08

## 2017-06-08 NOTE — TELEPHONE ENCOUNTER
Call placed to patient RE: Review survivorship care plan and accompanying materials including treatment summary, late effects of treatment and screening schedule. Left voicemail encouraging her to call me with any questions. Copy of survivorship care plan forwarded to treatment team.

## 2017-08-15 ENCOUNTER — LAB (OUTPATIENT)
Dept: LAB | Facility: HOSPITAL | Age: 62
End: 2017-08-15

## 2017-08-15 ENCOUNTER — OFFICE VISIT (OUTPATIENT)
Dept: ONCOLOGY | Facility: CLINIC | Age: 62
End: 2017-08-15

## 2017-08-15 VITALS
DIASTOLIC BLOOD PRESSURE: 85 MMHG | HEART RATE: 66 BPM | RESPIRATION RATE: 18 BRPM | SYSTOLIC BLOOD PRESSURE: 127 MMHG | TEMPERATURE: 97.7 F | BODY MASS INDEX: 29.95 KG/M2 | HEIGHT: 67 IN | WEIGHT: 190.8 LBS | OXYGEN SATURATION: 93 %

## 2017-08-15 DIAGNOSIS — C50.811 MALIGNANT NEOPLASM OF OVERLAPPING SITES OF RIGHT FEMALE BREAST (HCC): Primary | ICD-10-CM

## 2017-08-15 DIAGNOSIS — M85.80 OSTEOPENIA: ICD-10-CM

## 2017-08-15 DIAGNOSIS — C50.811 MALIGNANT NEOPLASM OF OVERLAPPING SITES OF RIGHT FEMALE BREAST (HCC): ICD-10-CM

## 2017-08-15 LAB
ALBUMIN SERPL-MCNC: 4.5 G/DL (ref 3.5–5.2)
ALBUMIN/GLOB SERPL: 1.4 G/DL
ALP SERPL-CCNC: 87 U/L (ref 40–129)
ALT SERPL W P-5'-P-CCNC: 23 U/L (ref 5–33)
ANION GAP SERPL CALCULATED.3IONS-SCNC: 11.7 MMOL/L
AST SERPL-CCNC: 23 U/L (ref 5–32)
BASOPHILS # BLD AUTO: 0.03 10*3/MM3 (ref 0–0.2)
BASOPHILS NFR BLD AUTO: 0.6 % (ref 0–2)
BILIRUB SERPL-MCNC: <0.2 MG/DL (ref 0.2–1.2)
BUN BLD-MCNC: 19 MG/DL (ref 8–23)
BUN/CREAT SERPL: 25.3 (ref 7–25)
CALCIUM SPEC-SCNC: 9.5 MG/DL (ref 8.8–10.5)
CHLORIDE SERPL-SCNC: 98 MMOL/L (ref 98–107)
CO2 SERPL-SCNC: 27.3 MMOL/L (ref 22–29)
CREAT BLD-MCNC: 0.75 MG/DL (ref 0.57–1)
DEPRECATED RDW RBC AUTO: 42.7 FL (ref 37–54)
EOSINOPHIL # BLD AUTO: 0.09 10*3/MM3 (ref 0.1–0.3)
EOSINOPHIL NFR BLD AUTO: 1.9 % (ref 0–4)
ERYTHROCYTE [DISTWIDTH] IN BLOOD BY AUTOMATED COUNT: 12.4 % (ref 11.5–14.5)
GFR SERPL CREATININE-BSD FRML MDRD: 79 ML/MIN/1.73
GLOBULIN UR ELPH-MCNC: 3.2 GM/DL
GLUCOSE BLD-MCNC: 95 MG/DL (ref 65–99)
HCT VFR BLD AUTO: 39.9 % (ref 37–47)
HGB BLD-MCNC: 13 G/DL (ref 12–16)
IMM GRANULOCYTES # BLD: 0.01 10*3/MM3 (ref 0–0.03)
IMM GRANULOCYTES NFR BLD: 0.2 % (ref 0–0.5)
LYMPHOCYTES # BLD AUTO: 1.63 10*3/MM3 (ref 0.6–4.8)
LYMPHOCYTES NFR BLD AUTO: 34 % (ref 20–45)
MCH RBC QN AUTO: 30.6 PG (ref 27–31)
MCHC RBC AUTO-ENTMCNC: 32.6 G/DL (ref 31–37)
MCV RBC AUTO: 93.9 FL (ref 81–99)
MONOCYTES # BLD AUTO: 0.32 10*3/MM3 (ref 0–1)
MONOCYTES NFR BLD AUTO: 6.7 % (ref 3–8)
NEUTROPHILS # BLD AUTO: 2.72 10*3/MM3 (ref 1.5–8.3)
NEUTROPHILS NFR BLD AUTO: 56.6 % (ref 45–70)
NRBC BLD MANUAL-RTO: 0 /100 WBC (ref 0–0)
PLATELET # BLD AUTO: 308 10*3/MM3 (ref 140–500)
PMV BLD AUTO: 8.6 FL (ref 7.4–10.4)
POTASSIUM BLD-SCNC: 4.1 MMOL/L (ref 3.5–5.2)
PROT SERPL-MCNC: 7.7 G/DL (ref 6–8.5)
RBC # BLD AUTO: 4.25 10*6/MM3 (ref 4.2–5.4)
SODIUM BLD-SCNC: 137 MMOL/L (ref 136–145)
WBC NRBC COR # BLD: 4.8 10*3/MM3 (ref 4.8–10.8)

## 2017-08-15 PROCEDURE — 36415 COLL VENOUS BLD VENIPUNCTURE: CPT | Performed by: INTERNAL MEDICINE

## 2017-08-15 PROCEDURE — 99214 OFFICE O/P EST MOD 30 MIN: CPT | Performed by: INTERNAL MEDICINE

## 2017-08-15 PROCEDURE — 85025 COMPLETE CBC W/AUTO DIFF WBC: CPT | Performed by: INTERNAL MEDICINE

## 2017-08-15 PROCEDURE — 80053 COMPREHEN METABOLIC PANEL: CPT | Performed by: INTERNAL MEDICINE

## 2017-08-15 NOTE — PROGRESS NOTES
Subjective     CHIEF COMPLAINT:    1. Stage [id number] right breast, upper outer quadrant, invasive ductal carcinoma, grade 2 with associated ductal carcinoma in situ. ER/CT 90%, Her2 negative    HISTORY OF PRESENT ILLNESS:     Jaimie Jeffries is a 61 y.o. female patient with the above medical history.  She has previously seen my partner Dr. Tom for her breast cancer.  This is my first time meeting the patient.  She is status post lumpectomy and radiation therapy and is currently on Arimidex.  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  She is also having vaginal dryness which ultimately results and painful intercourse.  She is a very active female and watches her nutritional intake.  She's noticed that she's had trouble losing any weight since she started Arimidex as well.  Besides the above new complaints she is doing well.  She works many hours as part of her job and does do frequent night call.  She has not noticed any new bone pain.  No fevers, chills.      Past Medical History:   Diagnosis Date   • Breast cancer     Right   • Depression    • Hx of radiation therapy    • PONV (postoperative nausea and vomiting)     PREFER SCOPE PATCH,ZOFRAN,PHENERGAN       Past Surgical History:   Procedure Laterality Date   • BREAST BIOPSY Right 2012, 2014    benign   • BREAST BIOPSY Right 2017    malignant   • BREAST LUMPECTOMY WITH SENTINEL NODE BIOPSY Right 2/13/2017    Procedure: BREAST LUMPECTOMY WITH SENTINEL NODE BIOPSY AND NEEDLE LOCALIZATION;  Surgeon: Fred Denny MD;  Location: [facility] OR [facility] [place];  Service:        Cancer-related family history includes Breast cancer (age of onset: 50) in her sister; Breast cancer (age of onset: 70) in her mother; Kidney cancer in her sister.  Social History    Substance Use Topics   • Smoking status: Never Smoker   • Smokeless tobacco: Never Used   • Alcohol use 1.2 oz/week     2 Glasses of wine per week       MEDICATIONS:    Current Outpatient Prescriptions:   •  amitriptyline (ELAVIL) 10 MG tablet, Take 10 mg by mouth Every Night., Disp: , Rfl:   •  anastrozole (ARIMIDEX) 1 MG tablet, Take 1 tablet by mouth Daily., Disp: 90 tablet, Rfl: 3  •  b complex-C-folic acid 1 MG capsule, Take 1 capsule by mouth Daily., Disp: , Rfl:   •  calcium carbonate-cholecalciferol 500-400 MG-UNIT tablet tablet, Take 1 tablet by mouth Daily., Disp: , Rfl:   •  escitalopram (LEXAPRO) 10 MG tablet, Take 10 mg by mouth Daily., Disp: , Rfl:   •  Lutein 10 MG tablet, Take  by mouth., Disp: , Rfl:   •  melatonin 5 MG tablet tablet, Take 5 mg by mouth Every Night., Disp: , Rfl:   •  Multiple Vitamins-Minerals (MULTIVITAL PO), Take  by mouth., Disp: , Rfl:   •  NON FORMULARY, 1 capsule Daily. NHT Nerium, Disp: , Rfl:     ALLERGIES:  No Known Allergies    REVIEW OF SYSTEMS:  GENERAL: No change in appetite or weight; No fevers, chills, sweats.  + hot flashes  SKIN: No nonhealing lesions. No rashes.  HEME/LYMPH: No easy bruising, bleeding. No swollen nodes.   EYES: No vision changes or diplopia.   ENT: No tinnitus, hearing loss, gum bleeding, epistaxis, hoarseness or dysphagia.   RESPIRATORY: No cough, shortness of breath, hemoptysis or wheezing.   CVS: No chest pain, palpitations, orthopnea, dyspnea on exertion or PND.   GI: No melena or hematochezia. No abdominal pain. No nausea, vomiting, constipation, diarrhea  : No lower tract obstructive symptoms, dysuria or hematuria.   MUSCULOSKELETAL: Arthralgias, worse in the hands  NEUROLOGICAL: No global weakness, loss of consciousness or seizures.   PSYCHIATRIC: No increased nervousness, mood changes or depression.     Objective   VITAL SIGNS:     Vitals:    08/15/17 1610   BP: 127/85   Pulse: 66   Resp: 18   Temp: 97.7 °F (36.5 °C)   SpO2: 93%  "  Weight: 190 lb 12.8 oz (86.5 kg)   Height: 66.73\" (169.5 cm)   PainSc: 0-No pain     Body mass index is 30.13 kg/(m^2).     Wt Readings from Last 3 Encounters:   08/15/17 190 lb 12.8 oz (86.5 kg)   04/24/17 196 lb 12.8 oz (89.3 kg)   03/20/17 194 lb 3.2 oz (88.1 kg)       PHYSICAL EXAMINATION:    GENERAL:  Well-developed, well-nourished female in no acute distress.   SKIN:  Warm, dry without rashes, purpura or petechiae.  NECK:  Supple with good range of motion; no thyromegaly  LYMPHATICS:  No cervical, supraclavicular, axillary adenopathy.  CHEST:  Lungs clear to percussion and auscultation. Good airflow. Right breast is status post lumpectomy.  She has scar tissue at approximately the 9:00 location. No palpable masses or skin changes  CARDIAC:  Regular rate and rhythm without murmurs, rubs or gallops. Normal S1,S2. No edema  EXTREMITIES:  No clubbing, cyanosis or edema.  PSYCHIATRIC:  Normal affect and mood.        DIAGNOSTIC DATA:   Results for orders placed or performed in visit on 08/15/17   Comprehensive Metabolic Panel   Result Value Ref Range    Glucose 95 65 - 99 mg/dL    BUN 19 8 - 23 mg/dL    Creatinine 0.75 0.57 - 1.00 mg/dL    Sodium 137 136 - 145 mmol/L    Potassium 4.1 3.5 - 5.2 mmol/L    Chloride 98 98 - 107 mmol/L    CO2 27.3 22.0 - 29.0 mmol/L    Calcium 9.5 8.8 - 10.5 mg/dL    Total Protein 7.7 6.0 - 8.5 g/dL    Albumin 4.50 3.50 - 5.20 g/dL    ALT (SGPT) 23 5 - 33 U/L    AST (SGOT) 23 5 - 32 U/L    Alkaline Phosphatase 87 40 - 129 U/L    Total Bilirubin <0.2 (L) 0.2 - 1.2 mg/dL    eGFR Non African Amer 79 >60 mL/min/1.73    Globulin 3.2 gm/dL    A/G Ratio 1.4 g/dL    BUN/Creatinine Ratio 25.3 (H) 7.0 - 25.0    Anion Gap 11.7 mmol/L   CBC Auto Differential   Result Value Ref Range    WBC 4.80 4.80 - 10.80 10*3/mm3    RBC 4.25 4.20 - 5.40 10*6/mm3    Hemoglobin 13.0 12.0 - 16.0 g/dL    Hematocrit 39.9 37.0 - 47.0 %    MCV 93.9 81.0 - 99.0 fL    MCH 30.6 27.0 - 31.0 pg    MCHC 32.6 31.0 - 37.0 " g/dL    RDW 12.4 11.5 - 14.5 %    RDW-SD 42.7 37.0 - 54.0 fl    MPV 8.6 7.4 - 10.4 fL    Platelets 308 140 - 500 10*3/mm3    Neutrophil % 56.6 45.0 - 70.0 %    Lymphocyte % 34.0 20.0 - 45.0 %    Monocyte % 6.7 3.0 - 8.0 %    Eosinophil % 1.9 0.0 - 4.0 %    Basophil % 0.6 0.0 - 2.0 %    Immature Grans % 0.2 0.0 - 0.5 %    Neutrophils, Absolute 2.72 1.50 - 8.30 10*3/mm3    Lymphocytes, Absolute 1.63 0.60 - 4.80 10*3/mm3    Monocytes, Absolute 0.32 0.00 - 1.00 10*3/mm3    Eosinophils, Absolute 0.09 (L) 0.10 - 0.30 10*3/mm3    Basophils, Absolute 0.03 0.00 - 0.20 10*3/mm3    Immature Grans, Absolute 0.01 0.00 - 0.03 10*3/mm3    nRBC 0.0 0.0 - 0.0 /100 WBC       Bone density from 10/12/16 revealed normal density in the lumbar spine.  There was evidence of osteopenia in the left hip.    Assessment/Plan   1. Stage [id number] right breast, upper outer quadrant, invasive ductal carcinoma, grade 2 with associated ductal carcinoma in situ. ER/TX 90%, Her2 negative   - status post lumpectomy and sentinel lymph node biopsy on 2/13/17.    - status post radiation therapy completed 4/11/17   - Arimidex started 4/2017. She's having side effects from the Arimidex including hot flashes, difficulty with sleep, vaginal dryness and arthralgias in her hands.  We discussed multiple options today including changing to Aromasin, increasing the Lexapro from 5 mg to 10 mg versus starting clonidine 0.1 mg nightly.  At this point patient wishes to try to increase her Lexapro from 5 mg to 10 mg and that doesn't work and we will pursue other measures.    - Mammogram due late 12/17 - ordered today   - Patient was instructed on the importance of physical activity, healthy diet, and self breast exams.   2. Osteopenia. DEXA 10/2016. On calcium and vitamin D. Repeat DEXA due 10/2018  3. Hot flashes from Arimidex. Discussed clonidine.  She's on Lexapro but only taking 5 mg. She'll increase Lexapro to 10 mg to see if it provides any benefit for the hot  flashes.   4. Arthralgias from Arimidex. Monitor for now. If progresses, we can do wash out period and then transition to Aromasin.     MD follow up in 4 months, but if she desires to try clonidine 0.1 mg nightly prn hot flashes, then she will call.     Marlen Christine MD  08/15/17

## 2017-08-24 ENCOUNTER — APPOINTMENT (OUTPATIENT)
Dept: ONCOLOGY | Facility: CLINIC | Age: 62
End: 2017-08-24

## 2017-08-24 ENCOUNTER — APPOINTMENT (OUTPATIENT)
Dept: LAB | Facility: HOSPITAL | Age: 62
End: 2017-08-24

## 2017-11-28 ENCOUNTER — OFFICE VISIT (OUTPATIENT)
Dept: INTERNAL MEDICINE | Facility: CLINIC | Age: 62
End: 2017-11-28

## 2017-11-28 VITALS
RESPIRATION RATE: 16 BRPM | OXYGEN SATURATION: 92 % | HEIGHT: 67 IN | DIASTOLIC BLOOD PRESSURE: 70 MMHG | TEMPERATURE: 98.8 F | HEART RATE: 77 BPM | WEIGHT: 196 LBS | BODY MASS INDEX: 30.76 KG/M2 | SYSTOLIC BLOOD PRESSURE: 122 MMHG

## 2017-11-28 DIAGNOSIS — Z00.00 ENCOUNTER FOR PREVENTIVE HEALTH EXAMINATION: ICD-10-CM

## 2017-11-28 DIAGNOSIS — Z12.11 SCREEN FOR COLON CANCER: ICD-10-CM

## 2017-11-28 DIAGNOSIS — C50.811 MALIGNANT NEOPLASM OF OVERLAPPING SITES OF RIGHT FEMALE BREAST, UNSPECIFIED ESTROGEN RECEPTOR STATUS (HCC): ICD-10-CM

## 2017-11-28 DIAGNOSIS — M85.80 OSTEOPENIA, UNSPECIFIED LOCATION: Primary | ICD-10-CM

## 2017-11-28 DIAGNOSIS — Z11.59 NEED FOR HEPATITIS C SCREENING TEST: ICD-10-CM

## 2017-11-28 PROCEDURE — 99396 PREV VISIT EST AGE 40-64: CPT | Performed by: INTERNAL MEDICINE

## 2017-11-28 RX ORDER — VENLAFAXINE HYDROCHLORIDE 75 MG/1
75 CAPSULE, EXTENDED RELEASE ORAL DAILY
Refills: 3 | COMMUNITY
Start: 2017-10-25 | End: 2018-05-03 | Stop reason: SDUPTHER

## 2017-11-28 NOTE — PROGRESS NOTES
Subjective   Jaimie Jeffries is a 62 y.o. female.     Chief Complaint   Patient presents with   • Follow-up         History of Present Illness     The following portions of the patient's history were reviewed and updated as appropriate: allergies, current medications, past social history and problem list.    Outpatient Prescriptions Marked as Taking for the 11/28/17 encounter (Office Visit) with Rj Crawford MD   Medication Sig Dispense Refill   • amitriptyline (ELAVIL) 10 MG tablet Take 10 mg by mouth Every Night.     • anastrozole (ARIMIDEX) 1 MG tablet Take 1 tablet by mouth Daily. 90 tablet 3   • b complex-C-folic acid 1 MG capsule Take 1 capsule by mouth Daily.     • calcium carbonate-cholecalciferol 500-400 MG-UNIT tablet tablet Take 1 tablet by mouth Daily.     • escitalopram (LEXAPRO) 10 MG tablet Take 10 mg by mouth Daily.     • lidocaine (XYLOCAINE) 2 % jelly APPLY A PEA SIZED AMOUNT TO VULVA PRN  12   • Lutein 10 MG tablet Take  by mouth.     • melatonin 5 MG tablet tablet Take 5 mg by mouth Every Night.     • Multiple Vitamins-Minerals (MULTIVITAL PO) Take  by mouth.     • NON FORMULARY 1 capsule Daily. NHT Nerium     • venlafaxine XR (EFFEXOR-XR) 75 MG 24 hr capsule Take 75 mg by mouth Daily.  3       Review of Systems   Constitutional: Negative for appetite change, chills, fatigue and fever.   HENT: Negative for congestion, ear pain, hearing loss, nosebleeds, postnasal drip, rhinorrhea, sinus pressure and trouble swallowing.    Eyes: Negative for pain, itching and visual disturbance.   Respiratory: Negative for cough, chest tightness, shortness of breath and wheezing.    Cardiovascular: Negative for chest pain, palpitations and leg swelling.   Gastrointestinal: Negative for abdominal pain, anal bleeding, constipation, diarrhea, nausea, rectal pain and vomiting.   Endocrine: Negative for cold intolerance, heat intolerance and polyuria.   Genitourinary: Negative for difficulty urinating, dysuria,  flank pain, frequency, hematuria and urgency.   Musculoskeletal: Negative for arthralgias, back pain and myalgias.   Skin: Negative for rash.   Allergic/Immunologic: Negative for environmental allergies.   Neurological: Negative for dizziness, syncope, speech difficulty, weakness, light-headedness, numbness and headaches.   Hematological: Does not bruise/bleed easily.   Psychiatric/Behavioral: Negative for agitation, confusion and sleep disturbance. The patient is not nervous/anxious.        Objective   Vitals:    11/28/17 1525   BP: 122/70   Pulse: 77   Resp: 16   Temp: 98.8 °F (37.1 °C)   SpO2: 92%      Last Weight    11/28/17  1525   Weight: 196 lb (88.9 kg)    [unfilled]  Body mass index is 30.95 kg/(m^2).      Physical Exam   Constitutional: She is oriented to person, place, and time. She appears well-developed and well-nourished.   HENT:   Head: Normocephalic and atraumatic.   Right Ear: External ear normal.   Left Ear: External ear normal.   Nose: Nose normal.   Mouth/Throat: Oropharynx is clear and moist.   Eyes: Conjunctivae and EOM are normal. Pupils are equal, round, and reactive to light.   Neck: Normal range of motion. Neck supple. No JVD present. No thyromegaly present.   Cardiovascular: Normal rate, regular rhythm, normal heart sounds and intact distal pulses.  Exam reveals no gallop.    No murmur heard.  Pulmonary/Chest: Effort normal and breath sounds normal. No respiratory distress. She has no wheezes. She has no rales.   Abdominal: Soft. Bowel sounds are normal. She exhibits no distension and no mass. There is no tenderness. There is no guarding. No hernia.   Musculoskeletal: Normal range of motion. She exhibits no edema.   Lymphadenopathy:     She has no cervical adenopathy.   Neurological: She is alert and oriented to person, place, and time. She displays normal reflexes. No cranial nerve deficit. Coordination normal.   Skin: Skin is warm and dry.   Psychiatric: She has a normal mood and  affect. Her behavior is normal. Judgment and thought content normal.   Nursing note and vitals reviewed.        Problem List Items Addressed This Visit        Musculoskeletal and Integument    Osteopenia - Primary       Other    Malignant neoplasm of overlapping sites of right female breast      Other Visit Diagnoses     Screen for colon cancer        Relevant Orders    Ambulatory Referral to Gastroenterology    Need for hepatitis C screening test        Encounter for preventive health examination            Assessment/Plan   In for annual preventative exam.  Overall health is excellent.  She has breast cancer and remains on arimadex for hormone blockade.  Surgery was February 2017.  Stage IA disease.  She's due for a lipid profile today.  Rest of her lab work is up-to-date.  Hepatitis C antibody today.  We'll update TD AP.  She'll hold off on zoster until the new vaccine is out.  Schedule routine colonoscopy.  Last colonoscopy was age 51.  Pap smear is up-to-date.  We'll recheck in one year.         Dragon disclaimer:   Much of this encounter note is an electronic transcription/translation of spoken language to printed text. The electronic translation of spoken language may permit erroneous, or at times, nonsensical words or phrases to be inadvertently transcribed; Although I have reviewed the note for such errors, some may still exist.

## 2017-11-29 LAB
CHOLEST SERPL-MCNC: 251 MG/DL (ref 0–200)
HCV AB S/CO SERPL IA: <0.1 S/CO RATIO (ref 0–0.9)
HDLC SERPL-MCNC: 67 MG/DL (ref 40–60)
LDLC SERPL CALC-MCNC: 156 MG/DL (ref 0–100)
TRIGL SERPL-MCNC: 139 MG/DL (ref 0–150)
VLDLC SERPL CALC-MCNC: 27.8 MG/DL (ref 5–40)

## 2017-12-04 ENCOUNTER — TELEPHONE (OUTPATIENT)
Dept: GASTROENTEROLOGY | Facility: CLINIC | Age: 62
End: 2017-12-04

## 2017-12-04 NOTE — TELEPHONE ENCOUNTER
Spoke with patient and informed her that her last colonoscopy was in 2009 and Dr Arias said that her next colonoscopy for screening is not due until 2019. Patient voiced understanding.  Placed in recall for 9/2019.

## 2017-12-04 NOTE — TELEPHONE ENCOUNTER
----- Message from Denita Arias MD sent at 12/4/2017 11:29 AM EST -----  Regarding: RE: OA PAPERWORK  If she is not having any issues with her BMs-- blood, diarrhea, etc-- she does not need screening colonoscopy until 2019.    Can we call her to check and place her in recall for 2019    Thanks    ----- Message -----     From: Carla Ruiz RN     Sent: 12/1/2017  12:58 PM       To: Denita Arias MD  Subject: OA PAPERWORK                                     Open Access/Recall paperwork scanned in under the media tab.  Please review and return to Carla.

## 2017-12-11 ENCOUNTER — CLINICAL SUPPORT (OUTPATIENT)
Dept: INTERNAL MEDICINE | Facility: CLINIC | Age: 62
End: 2017-12-11

## 2017-12-11 DIAGNOSIS — Z23 NEED FOR VACCINATION: Primary | ICD-10-CM

## 2017-12-11 PROCEDURE — 90715 TDAP VACCINE 7 YRS/> IM: CPT | Performed by: INTERNAL MEDICINE

## 2017-12-11 PROCEDURE — 90471 IMMUNIZATION ADMIN: CPT | Performed by: INTERNAL MEDICINE

## 2017-12-11 NOTE — PATIENT INSTRUCTIONS
Tdap Vaccine (Tetanus, Diphtheria and Pertussis): What You Need to Know  1. Why get vaccinated?  Tetanus, diphtheria and pertussis are very serious diseases. Tdap vaccine can protect us from these diseases. And, Tdap vaccine given to pregnant women can protect  babies against pertussis.  TETANUS (Lockjaw) is rare in the [place] today. It causes painful muscle tightening and stiffness, usually all over the body.  · It can lead to tightening of muscles in the head and neck so you can't open your mouth, swallow, or sometimes even breathe. Tetanus kills about 1 out of 10 people who are infected even after receiving the best medical care.  DIPHTHERIA is also rare in the [place] today. It can cause a thick coating to form in the back of the throat.  · It can lead to breathing problems, heart failure, paralysis, and death.  PERTUSSIS (Whooping Cough) causes severe coughing spells, which can cause difficulty breathing, vomiting and disturbed sleep.  · It can also lead to weight loss, incontinence, and rib fractures. Up to 2 in 100 adolescents and 5 in 100 adults with pertussis are hospitalized or have complications, which could include pneumonia or death.  These diseases are caused by bacteria. Diphtheria and pertussis are spread from person to person through secretions from coughing or sneezing. Tetanus enters the body through cuts, scratches, or wounds.  Before vaccines, as many as 200,000 cases of diphtheria, 200,000 cases of pertussis, and hundreds of cases of tetanus, were reported in the [place] each year. Since vaccination began, reports of cases for tetanus and diphtheria have dropped by about 99% and for pertussis by about 80%.  2. Tdap vaccine  Tdap vaccine can protect adolescents and adults from tetanus, diphtheria, and pertussis. One dose of Tdap is routinely given at age 11 or 12. People who did not get Tdap at that age should get it as soon as possible.  Tdap is especially important  for healthcare professionals and anyone having close contact with a baby younger than 12 months.  Pregnant women should get a dose of Tdap during every pregnancy, to protect the  from pertussis. Infants are most at risk for severe, life-threatening complications from pertussis.  Another vaccine, called Td, protects against tetanus and diphtheria, but not pertussis. A Td booster should be given every 10 years. Tdap may be given as one of these boosters if you have never gotten Tdap before. Tdap may also be given after a severe cut or burn to prevent tetanus infection.  Your doctor or the person giving you the vaccine can give you more information.  Tdap may safely be given at the same time as other vaccines.  3. Some people should not get this vaccine  · A person who has ever had a life-threatening allergic reaction after a previous dose of any diphtheria, tetanus or pertussis containing vaccine, OR has a severe allergy to any part of this vaccine, should not get Tdap vaccine. Tell the person giving the vaccine about any severe allergies.  · Anyone who had coma or long repeated seizures within 7 days after a childhood dose of DTP or DTaP, or a previous dose of Tdap, should not get Tdap, unless a cause other than the vaccine was found. They can still get Td.  · Talk to your doctor if you:    have seizures or another nervous system problem,    had severe pain or swelling after any vaccine containing diphtheria, tetanus or pertussis,    ever had a condition called Guillain-Barré Syndrome (GBS),    aren't feeling well on the day the shot is scheduled.  4. Risks  With any medicine, including vaccines, there is a chance of side effects. These are usually mild and go away on their own. Serious reactions are also possible but are rare.  Most people who get Tdap vaccine do not have any problems with it.  Mild problems following Tdap  (Did not interfere with activities)  · Pain where the shot was given (about 3 in 4  adolescents or 2 in 3 adults)  · Redness or swelling where the shot was given (about 1 person in 5)  · Mild fever of at least 100.4°F (up to about 1 in 25 adolescents or 1 in 100 adults)  · Headache (about 3 or 4 people in 10)  · Tiredness (about 1 person in 3 or 4)  · Nausea, vomiting, diarrhea, stomach ache (up to 1 in 4 adolescents or 1 in 10 adults)  · Chills, sore joints (about 1 person in 10)  · Body aches (about 1 person in 3 or 4)  · Rash, swollen glands (uncommon)  Moderate problems following Tdap  (Interfered with activities, but did not require medical attention)  · Pain where the shot was given (up to 1 in 5 or 6)  · Redness or swelling where the shot was given (up to about 1 in 16 adolescents or 1 in 12 adults)  · Fever over 102°F (about 1 in 100 adolescents or 1 in 250 adults)  · Headache (about 1 in 7 adolescents or 1 in 10 adults)  · Nausea, vomiting, diarrhea, stomach ache (up to 1 or 3 people in 100)  · Swelling of the entire arm where the shot was given (up to about 1 in 500).  Severe problems following Tdap  (Unable to perform usual activities; required medical attention)  · Swelling, severe pain, bleeding and redness in the arm where the shot was given (rare).  Problems that could happen after any vaccine:  · People sometimes faint after a medical procedure, including vaccination. Sitting or lying down for about 15 minutes can help prevent fainting, and injuries caused by a fall. Tell your doctor if you feel dizzy, or have vision changes or ringing in the ears.  · Some people get severe pain in the shoulder and have difficulty moving the arm where a shot was given. This happens very rarely.  · Any medication can cause a severe allergic reaction. Such reactions from a vaccine are very rare, estimated at fewer than 1 in a million doses, and would happen within a few minutes to a few hours after the vaccination.  As with any medicine, there is a very remote chance of a vaccine causing a serious  injury or death.  The safety of vaccines is always being monitored. For more information, visit: www.cdc.gov/vaccinesafety/  5. What if there is a serious problem?  What should I look for?  · Look for anything that concerns you, such as signs of a severe allergic reaction, very high fever, or unusual behavior.  · Signs of a severe allergic reaction can include hives, swelling of the face and throat, difficulty breathing, a fast heartbeat, dizziness, and weakness. These would usually start a few minutes to a few hours after the vaccination.  What should I do?  · If you think it is a severe allergic reaction or other emergency that can't wait, call 9-1-1 or get the person to the nearest hospital. Otherwise, call your doctor.  · Afterward, the reaction should be reported to the Vaccine Adverse Event Reporting System (VAERS). Your doctor might file this report, or you can do it yourself through the VAERS web site at www.vaers.hhs.gov, or by calling 1-494.378.1445.  VAERS does not give medical advice.   6. The National Vaccine Injury Compensation Program  The National Vaccine Injury Compensation Program (VICP) is a federal program that was created to compensate people who may have been injured by certain vaccines.  Persons who believe they may have been injured by a vaccine can learn about the program and about filing a claim by calling 1-446.235.4995 or visiting the VICP website at www.hrsa.gov/vaccinecompensation. There is a time limit to file a claim for compensation.  7. How can I learn more?  · Ask your doctor. He or she can give you the vaccine package insert or suggest other sources of information.  · Call your local or state health department.  · Contact the Centers for Disease Control and Prevention (CDC):    Call 1-453.488.5282 (3-481-MTY-INFO) or    Visit CDC's website at www.cdc.gov/vaccines  CDC Tdap Vaccine VIS (2/24/15)     This information is not intended to replace advice given to you by your health care  provider. Make sure you discuss any questions you have with your health care provider.     Document Released: 06/18/2013 Document Revised: 01/08/2016 Document Reviewed: 04/01/2015  Elsevier Interactive Patient Education ©2017 [employer]

## 2017-12-18 ENCOUNTER — TRANSCRIBE ORDERS (OUTPATIENT)
Dept: ADMINISTRATIVE | Facility: HOSPITAL | Age: 62
End: 2017-12-18

## 2017-12-18 DIAGNOSIS — Z12.31 VISIT FOR SCREENING MAMMOGRAM: Primary | ICD-10-CM

## 2017-12-21 ENCOUNTER — OFFICE VISIT (OUTPATIENT)
Dept: ONCOLOGY | Facility: CLINIC | Age: 62
End: 2017-12-21

## 2017-12-21 ENCOUNTER — APPOINTMENT (OUTPATIENT)
Dept: LAB | Facility: HOSPITAL | Age: 62
End: 2017-12-21

## 2017-12-21 VITALS
WEIGHT: 191.6 LBS | BODY MASS INDEX: 30.07 KG/M2 | RESPIRATION RATE: 16 BRPM | SYSTOLIC BLOOD PRESSURE: 130 MMHG | HEIGHT: 67 IN | DIASTOLIC BLOOD PRESSURE: 82 MMHG | TEMPERATURE: 98.4 F | HEART RATE: 74 BPM

## 2017-12-21 DIAGNOSIS — C50.811 MALIGNANT NEOPLASM OF OVERLAPPING SITES OF RIGHT BREAST IN FEMALE, ESTROGEN RECEPTOR POSITIVE (HCC): Primary | ICD-10-CM

## 2017-12-21 DIAGNOSIS — Z17.0 MALIGNANT NEOPLASM OF OVERLAPPING SITES OF RIGHT BREAST IN FEMALE, ESTROGEN RECEPTOR POSITIVE (HCC): Primary | ICD-10-CM

## 2017-12-21 PROCEDURE — G0463 HOSPITAL OUTPT CLINIC VISIT: HCPCS | Performed by: INTERNAL MEDICINE

## 2017-12-21 PROCEDURE — 99213 OFFICE O/P EST LOW 20 MIN: CPT | Performed by: INTERNAL MEDICINE

## 2017-12-21 NOTE — PROGRESS NOTES
Subjective     CHIEF COMPLAINT:    1. Stage [id number] right breast, upper outer quadrant, invasive ductal carcinoma, grade 2 with associated ductal carcinoma in situ. ER/[place] 90%, Her2 negative    HISTORY OF PRESENT ILLNESS:     Jaimie Jeffries is a 62 y.o. female patient with the above medical history.  Patient remains on Arimidex.  The last saw the patient she was struggling quite significantly with insomnia, hot flashes, arthralgias.  We reviewed multiple treatment options.  Ultimately the patient transitioned off the Lexapro started Effexor which is helping majority of her symptoms.  She denies any fevers, chills, night sweats.  Her arthralgias are tolerable.  She continues to struggle with vaginal dryness and lack of sex drive although at this point does not want other interventions.      Past Medical History:   Diagnosis Date   • Breast cancer     Right   • Depression    • Hx of radiation therapy    • PONV (postoperative nausea and vomiting)     PREFER SCOPE PATCH,ZOFRAN,PHENERGAN       Past Surgical History:   Procedure Laterality Date   • BREAST BIOPSY Right 2012, 2014    benign   • BREAST BIOPSY Right 2017    malignant   • BREAST LUMPECTOMY WITH SENTINEL NODE BIOPSY Right 2/13/2017    Procedure: BREAST LUMPECTOMY WITH SENTINEL NODE BIOPSY AND NEEDLE LOCALIZATION;  Surgeon: Fred Denny MD;  [place] [facility] [place] [facility] [place]:        Cancer-related family history includes Breast cancer (age of onset: 50) in her sister; Breast cancer (age of onset: 70) in her mother; Kidney cancer in her sister.  Social History   Substance Use Topics   • Smoking status: Never Smoker   • Smokeless tobacco: Never Used   • Alcohol use 1.2 oz/week     2 Glasses of wine per week       MEDICATIONS:    Current Outpatient Prescriptions:   •  amitriptyline (ELAVIL) 10 MG tablet, Take 10 mg by mouth Every Night., Disp: , Rfl:   •  anastrozole (ARIMIDEX) 1 MG tablet, Take 1 tablet by mouth Daily., Disp: 90 tablet, Rfl: 3  •  b  complex-C-folic acid 1 MG capsule, Take 1 capsule by mouth Daily., Disp: , Rfl:   •  calcium carbonate-cholecalciferol 500-400 MG-UNIT tablet tablet, Take 1 tablet by mouth Daily., Disp: , Rfl:   •  escitalopram (LEXAPRO) 10 MG tablet, Take 10 mg by mouth Daily., Disp: , Rfl:   •  lidocaine (XYLOCAINE) 2 % jelly, APPLY A PEA SIZED AMOUNT TO VULVA PRN, Disp: , Rfl: 12  •  Lutein 10 MG tablet, Take  by mouth., Disp: , Rfl:   •  melatonin 5 MG tablet tablet, Take 5 mg by mouth Every Night., Disp: , Rfl:   •  Multiple Vitamins-Minerals (MULTIVITAL PO), Take  by mouth., Disp: , Rfl:   •  NON FORMULARY, 1 capsule Daily. NHT Nerium, Disp: , Rfl:   •  venlafaxine XR (EFFEXOR-XR) 75 MG 24 hr capsule, Take 75 mg by mouth Daily., Disp: , Rfl: 3    ALLERGIES:  No Known Allergies    REVIEW OF SYSTEMS:  Review of Systems   Constitutional: Negative for activity change, appetite change, chills, diaphoresis, fatigue, fever and unexpected weight change.   HENT: Negative.    Eyes: Negative.    Respiratory: Negative for cough, chest tightness and shortness of breath.    Cardiovascular: Negative for chest pain, palpitations and leg swelling.   Gastrointestinal: Negative for abdominal pain, blood in stool, constipation, diarrhea, nausea and vomiting.   Endocrine: Negative.    Genitourinary: Negative for vaginal discharge.   Musculoskeletal: Positive for arthralgias. Negative for joint swelling and myalgias.   Hematological: Negative for adenopathy. Does not bruise/bleed easily.       Objective   VITAL SIGNS:     There were no vitals filed for this visit.  There is no height or weight on file to calculate BMI.     Wt Readings from Last 3 Encounters:   11/28/17 88.9 kg (196 lb)   08/15/17 86.5 kg (190 lb 12.8 oz)   04/24/17 89.3 kg (196 lb 12.8 oz)       PHYSICAL EXAMINATION:  GENERAL: female comfortable, no acute distress  SKIN:  Warm, without rashes, purpura or petechiae.   EYES:  EOMs intact.  Conjunctivae normal. Pupils equal and  reactive to light.   EARS:  Hearing intact.  LYMPHATICS:  No cervical, supraclavicular, axillary adenopathy.  RESP:  Lungs clear to percussion and auscultation. Good airflow. Normal effort.   CHEST: Mediport -No; Breast exam - Yes, describe: Stable scar tissue in the lateral side of right breast.  No palpable masses or skin changes.  CARDIAC:  Regular rate and rhythm without murmurs, rubs or gallops. Normal S1,S2. Lower extremity edema:  No  GI:  Soft, nontender, normal bowel sounds, no hepatosplenomegaly  PSYCHIATRIC:  Normal affect and mood; alert and oriented x 3; Insight and judgement appropriate      DIAGNOSTIC DATA:   Results for orders placed or performed in visit on 11/28/17   Lipid Panel   Result Value Ref Range    Total Cholesterol 251 (H) 0 - 200 mg/dL    Triglycerides 139 0 - 150 mg/dL    HDL Cholesterol 67 (H) 40 - 60 mg/dL    VLDL Cholesterol 27.8 5 - 40 mg/dL    LDL Cholesterol  156 (H) 0 - 100 mg/dL   Hepatitis C Antibody   Result Value Ref Range    Hep C Virus Ab <0.1 0.0 - 0.9 s/co ratio     Mammogram: none      Assessment/Plan   1. Stage [id number] right breast, upper outer quadrant, invasive ductal carcinoma, grade 2 with associated ductal carcinoma in situ. ER/[place] 90%, Her2 negative   - status post lumpectomy and sentinel lymph node biopsy on 2/13/17.    - status post radiation therapy completed 4/11/17   - Arimidex started 4/2017. Tolerating better with addition of Arimidex.    - Mammogram scheduled for 1/2017   - Patient was instructed on the importance of physical activity, healthy diet, and self breast exams.    - Remains no evidence of disease.   2. Osteopenia. DEXA 10/2016. On calcium and vitamin D. Repeat DEXA due 10/2018  3. Hot flashes from Arimidex. Improved on Effexor.    4. Arthralgias from Arimidex. Monitor for now. If progresses, we can do wash out period and then transition to Aromasin.     MD follow up in 6 months    Marlen Christine MD  12/21/17

## 2018-02-13 ENCOUNTER — HOSPITAL ENCOUNTER (OUTPATIENT)
Dept: MAMMOGRAPHY | Facility: HOSPITAL | Age: 63
Discharge: HOME | End: 2018-02-13
Attending: OBSTETRICS & GYNECOLOGY | Admitting: OBSTETRICS & GYNECOLOGY

## 2018-02-13 DIAGNOSIS — Z12.31 VISIT FOR SCREENING MAMMOGRAM: ICD-10-CM

## 2018-02-13 PROCEDURE — 77063 BREAST TOMOSYNTHESIS BI: CPT

## 2018-02-13 PROCEDURE — 77067 SCR MAMMO BI INCL CAD: CPT

## 2018-04-12 RX ORDER — ANASTROZOLE 1 MG/1
1 TABLET ORAL DAILY
Qty: 90 TABLET | Refills: 3 | Status: SHIPPED | OUTPATIENT
Start: 2018-04-12 | End: 2019-04-18 | Stop reason: SDUPTHER

## 2018-05-03 ENCOUNTER — OFFICE VISIT (OUTPATIENT)
Dept: INTERNAL MEDICINE | Facility: CLINIC | Age: 63
End: 2018-05-03

## 2018-05-03 VITALS
BODY MASS INDEX: 29.91 KG/M2 | WEIGHT: 190.6 LBS | DIASTOLIC BLOOD PRESSURE: 98 MMHG | TEMPERATURE: 99 F | RESPIRATION RATE: 16 BRPM | SYSTOLIC BLOOD PRESSURE: 154 MMHG | HEART RATE: 70 BPM | HEIGHT: 67 IN

## 2018-05-03 DIAGNOSIS — G47.30 SLEEP APNEA, UNSPECIFIED TYPE: Primary | ICD-10-CM

## 2018-05-03 PROCEDURE — 99213 OFFICE O/P EST LOW 20 MIN: CPT | Performed by: INTERNAL MEDICINE

## 2018-05-03 NOTE — PATIENT INSTRUCTIONS
Fat and Cholesterol Restricted Diet  High levels of fat and cholesterol in your blood may lead to various health problems, such as diseases of the heart, blood vessels, gallbladder, liver, and pancreas. Fats are concentrated sources of energy that come in various forms. Certain types of fat, including saturated fat, may be harmful in excess. Cholesterol is a substance needed by your body in small amounts. Your body makes all the cholesterol it needs. Excess cholesterol comes from the food you eat.  When you have high levels of cholesterol and saturated fat in your blood, health problems can develop because the excess fat and cholesterol will gather along the walls of your blood vessels, causing them to narrow. Choosing the right foods will help you control your intake of fat and cholesterol. This will help keep the levels of these substances in your blood within normal limits and reduce your risk of disease.  What is my plan?  Your health care provider recommends that you:  · Limit your fat intake to ______% or less of your total calories per day.  · Limit the amount of cholesterol in your diet to less than _________mg per day.  · Eat 20-30 grams of fiber each day.  What types of fat should I choose?  · Choose healthy fats more often. Choose monounsaturated and polyunsaturated fats, such as olive and canola oil, flaxseeds, walnuts, almonds, and seeds.  · Eat more omega-3 fats. Good choices include salmon, mackerel, sardines, tuna, flaxseed oil, and ground flaxseeds. Aim to eat fish at least two times a week.  · Limit saturated fats. Saturated fats are primarily found in animal products, such as meats, butter, and cream. Plant sources of saturated fats include palm oil, palm kernel oil, and coconut oil.  · Avoid foods with partially hydrogenated oils in them. These contain trans fats. Examples of foods that contain trans fats are stick margarine, some tub margarines, cookies, crackers, and other baked goods.  What  "general guidelines do I need to follow?  These guidelines for healthy eating will help you control your intake of fat and cholesterol:  · Check food labels carefully to identify foods with trans fats or high amounts of saturated fat.  · Fill one half of your plate with vegetables and green salads.  · Fill one fourth of your plate with whole grains. Look for the word \"whole\" as the first word in the ingredient list.  · Fill one fourth of your plate with lean protein foods.  · Limit fruit to two servings a day. Choose fruit instead of juice.  · Eat more foods that contain fiber, such as apples, broccoli, carrots, beans, peas, and barley.  · Eat more home-cooked food and less restaurant, buffet, and fast food.  · Limit or avoid alcohol.  · Limit foods high in starch and sugar.  · Limit fried foods.  · Cook foods using methods other than frying. Baking, boiling, grilling, and broiling are all great options.  · Lose weight if you are overweight. Losing just 5-10% of your initial body weight can help your overall health and prevent diseases such as diabetes and heart disease.  What foods can I eat?  Grains   · Whole grains, such as whole wheat or whole grain breads, crackers, cereals, and pasta. Unsweetened oatmeal, bulgur, barley, quinoa, or brown rice. Corn or whole wheat flour tortillas.  Vegetables   · Fresh or frozen vegetables (raw, steamed, roasted, or grilled). Green salads.  Fruits   · All fresh, canned (in natural juice), or frozen fruits.  Meats and other protein foods   · Ground beef (85% or leaner), grass-fed beef, or beef trimmed of fat. Skinless chicken or turkey. Ground chicken or turkey. Pork trimmed of fat. All fish and seafood. Eggs. Dried beans, peas, or lentils. Unsalted nuts or seeds. Unsalted canned or dry beans.  Dairy   · Low-fat dairy products, such as skim or 1% milk, 2% or reduced-fat cheeses, low-fat ricotta or cottage cheese, or plain low-fat yo  Fats and oils   · Tub margarines without trans " fats. Light or reduced-fat mayonnaise and salad dressings. Avocado. Olive, canola, sesame, or safflower oils. Natural peanut or almond butter (choose ones without added sugar and oil).  The items listed above may not be a complete list of recommended foods or beverages. Contact your dietitian for more options.   Foods to avoid  Grains   · White bread. White pasta. White rice. Cornbread. Bagels, pastries, and croissants. Crackers that contain trans fat.  Vegetables   · White potatoes. Corn. Creamed or fried vegetables. Vegetables in a cheese sauce.  Fruits   · Dried fruits. Canned fruit in light or heavy syrup. Fruit juice.  Meats and other protein foods   · Fatty cuts of meat. Ribs, chicken wings, collier, sausage, bologna, salami, chitterlings, fatback, hot dogs, bratwurst, and packaged luncheon meats. Liver and organ meats.  Dairy   · Whole or 2% milk, cream, half-and-half, and cream cheese. Whole milk cheeses. Whole-fat or sweetened yogurt. Full-fat cheeses. Nondairy creamers and whipped toppings. Processed cheese, cheese spreads, or cheese curds.  Beverages   · Alcohol. Sweetened drinks (such as sodas, lemonade, and fruit drinks or punches).  Fats and oils   · Butter, stick margarine, lard, shortening, ghee, or collier fat. Coconut, palm kernel, or palm oils.  Sweets and desserts   · Corn syrup, sugars, honey, and molasses. Candy. Jam and jelly. Syrup. Sweetened cereals. Cookies, pies, cakes, donuts, muffins, and ice cream.  The items listed above may not be a complete list of foods and beverages to avoid. Contact your dietitian for more information.   This information is not intended to replace advice given to you by your health care provider. Make sure you discuss any questions you have with your health care provider.  Document Released: 12/18/2006 Document Revised: 01/08/2016 Document Reviewed: 03/18/2015  [employer] Interactive Patient Education © 2017 [employer]  Exercising to Stay Healthy  Exercising  regularly is important. It has many health benefits, such as:  · Improving your overall fitness, flexibility, and endurance.  · Increasing your bone density.  · Helping with weight control.  · Decreasing your body fat.  · Increasing your muscle strength.  · Reducing stress and tension.  · Improving your overall health.  In order to become healthy and stay healthy, it is recommended that you do moderate-intensity and vigorous-intensity exercise. You can tell that you are exercising at a moderate intensity if you have a higher heart rate and faster breathing, but you are still able to hold a conversation. You can tell that you are exercising at a vigorous intensity if you are breathing much harder and faster and cannot hold a conversation while exercising.  How often should I exercise?  Choose an activity that you enjoy and set realistic goals. Your health care provider can help you to make an activity plan that works for you. Exercise regularly as directed by your health care provider. This may include:  · Doing resistance training twice each week, such as:  ¨ Push-ups.  ¨ Sit-ups.  ¨ Lifting weights.  ¨ Using resistance bands.  · Doing a given intensity of exercise for a given amount of time. Choose from these options:  ¨ 150 minutes of moderate-intensity exercise every week.  ¨ 75 minutes of vigorous-intensity exercise every week.  ¨ A mix of moderate-intensity and vigorous-intensity exercise every week.  Children, pregnant women, people who are out of shape, people who are overweight, and older adults may need to consult a health care provider for individual recommendations. If you have any sort of medical condition, be sure to consult your health care provider before starting a new exercise program.  What are some exercise ideas?  Some moderate-intensity exercise ideas include:  · Walking at a rate of 1 mile in 15 minutes.  · Biking.  · Hiking.  · Golfing.  · Dancing.  Some vigorous-intensity exercise ideas  include:  · Walking at a rate of at least 4.5 miles per hour.  · Jogging or running at a rate of 5 miles per hour.  · Biking at a rate of at least 10 miles per hour.  · Lap swimming.  · Roller-skating or in-line skating.  · Cross-country skiing.  · Vigorous competitive sports, such as football, basketball, and soccer.  · Jumping rope.  · Aerobic dancing.  What are some everyday activities that can help me to get exercise?  · Yard work, such as:  ¨ Pushing a .  ¨ Raking and bagging leaves.  · Washing and waxing your car.  · Pushing a stroller.  · Shoveling snow.  · Gardening.  · Washing windows or floors.  How can I be more active in my day-to-day activities?  · Use the stairs instead of the elevator.  · Take a walk during your lunch break.  · If you drive, park your car farther away from work or school.  · If you take public transportation, get off one stop early and walk the rest of the way.  · Make all of your phone calls while standing up and walking around.  · Get up, stretch, and walk around every 30 minutes throughout the day.  What guidelines should I follow while exercising?  · Do not exercise so much that you hurt yourself, feel dizzy, or get very short of breath.  · Consult your health care provider before starting a new exercise program.  · Wear comfortable clothes and shoes with good support.  · Drink plenty of water while you exercise to prevent dehydration or heat stroke. Body water is lost during exercise and must be replaced.  · Work out until you breathe faster and your heart beats faster.  This information is not intended to replace advice given to you by your health care provider. Make sure you discuss any questions you have with your health care provider.  Document Released: 01/20/2012 Document Revised: 05/25/2017 Document Reviewed: 05/21/2015  [employer] Interactive Patient Education © 2017 [employer]  Calorie Counting for Weight Loss  Calories are units of energy. Your body needs a  certain amount of calories from food to keep you going throughout the day. When you eat more calories than your body needs, your body stores the extra calories as fat. When you eat fewer calories than your body needs, your body burns fat to get the energy it needs.  Calorie counting means keeping track of how many calories you eat and drink each day. Calorie counting can be helpful if you need to lose weight. If you make sure to eat fewer calories than your body needs, you should lose weight. Ask your health care provider what a healthy weight is for you.  For calorie counting to work, you will need to eat the right number of calories in a day in order to lose a healthy amount of weight per week. A dietitian can help you determine how many calories you need in a day and will give you suggestions on how to reach your calorie goal.  · A healthy amount of weight to lose per week is usually 1-2 lb (0.5-0.9 kg). This usually means that your daily calorie intake should be reduced by 500-750 calories.  · Eating 1,200 - 1,500 calories per day can help most women lose weight.  · Eating 1,500 - 1,800 calories per day can help most men lose weight.  What is my plan?  My goal is to have __________ calories per day.  If I have this many calories per day, I should lose around __________ pounds per week.  What do I need to know about calorie counting?  In order to meet your daily calorie goal, you will need to:  · Find out how many calories are in each food you would like to eat. Try to do this before you eat.  · Decide how much of the food you plan to eat.  · Write down what you ate and how many calories it had. Doing this is called keeping a food log.  To successfully lose weight, it is important to balance calorie counting with a healthy lifestyle that includes regular activity. Aim for 150 minutes of moderate exercise (such as walking) or 75 minutes of vigorous exercise (such as running) each week.  Where do I find calorie  information?     The number of calories in a food can be found on a Nutrition Facts label. If a food does not have a Nutrition Facts label, try to look up the calories online or ask your dietitian for help.  Remember that calories are listed per serving. If you choose to have more than one serving of a food, you will have to multiply the calories per serving by the amount of servings you plan to eat. For example, the label on a package of bread might say that a serving size is 1 slice and that there are 90 calories in a serving. If you eat 1 slice, you will have eaten 90 calories. If you eat 2 slices, you will have eaten 180 calories.  How do I keep a food log?  Immediately after each meal, record the following information in your food log:  · What you ate. Don't forget to include toppings, sauces, and other extras on the food.  · How much you ate. This can be measured in cups, ounces, or number of items.  · How many calories each food and drink had.  · The total number of calories in the meal.  Keep your food log near you, such as in a small notebook in your pocket, or use a mobile phillip or website. Some programs will calculate calories for you and show you how many calories you have left for the day to meet your goal.  What are some calorie counting tips?  · Use your calories on foods and drinks that will fill you up and not leave you hungry:  ¨ Some examples of foods that fill you up are nuts and nut butters, vegetables, lean proteins, and high-fiber foods like whole grains. High-fiber foods are foods with more than 5 g fiber per serving.  ¨ Drinks such as sodas, specialty coffee drinks, alcohol, and juices have a lot of calories, yet do not fill you up.  · Eat nutritious foods and avoid empty calories. Empty calories are calories you get from foods or beverages that do not have many vitamins or protein, such as candy, sweets, and soda. It is better to have a nutritious high-calorie food (such as an avocado) than a  "food with few nutrients (such as a bag of chips).  · Know how many calories are in the foods you eat most often. This will help you calculate calorie counts faster.  · Pay attention to calories in drinks. Low-calorie drinks include water and unsweetened drinks.  · Pay attention to nutrition labels for \"low fat\" or \"fat free\" foods. These foods sometimes have the same amount of calories or more calories than the full fat versions. They also often have added sugar, starch, or salt, to make up for flavor that was removed with the fat.  · Find a way of tracking calories that works for you. Get creative. Try different apps or programs if writing down calories does not work for you.  What are some portion control tips?  · Know how many calories are in a serving. This will help you know how many servings of a certain food you can have.  · Use a measuring cup to measure serving sizes. You could also try weighing out portions on a kitchen scale. With time, you will be able to estimate serving sizes for some foods.  · Take some time to put servings of different foods on your favorite plates, bowls, and cups so you know what a serving looks like.  · Try not to eat straight from a bag or box. Doing this can lead to overeating. Put the amount you would like to eat in a cup or on a plate to make sure you are eating the right portion.  · Use smaller plates, glasses, and bowls to prevent overeating.  · Try not to multitask (for example, watch TV or use your computer) while eating. If it is time to eat, sit down at a table and enjoy your food. This will help you to know when you are full. It will also help you to be aware of what you are eating and how much you are eating.  What are tips for following this plan?  Reading food labels   · Check the calorie count compared to the serving size. The serving size may be smaller than what you are used to eating.  · Check the source of the calories. Make sure the food you are eating is high " in vitamins and protein and low in saturated and trans fats.  Shopping   · Read nutrition labels while you shop. This will help you make healthy decisions before you decide to purchase your food.  · Make a grocery list and stick to it.  Cooking   · Try to cook your favorite foods in a healthier way. For example, try baking instead of frying.  · Use low-fat dairy products.  Meal planning   · Use more fruits and vegetables. Half of your plate should be fruits and vegetables.  · Include lean proteins like poultry and fish.  How do I count calories when eating out?  · Ask for smaller portion sizes.  · Consider sharing an entree and sides instead of getting your own entree.  · If you get your own entree, eat only half. Ask for a box at the beginning of your meal and put the rest of your entree in it so you are not tempted to eat it.  · If calories are listed on the menu, choose the lower calorie options.  · Choose dishes that include vegetables, fruits, whole grains, low-fat dairy products, and lean protein.  · Choose items that are boiled, broiled, grilled, or steamed. Stay away from items that are buttered, battered, fried, or served with cream sauce. Items labeled “crispy” are usually fried, unless stated otherwise.  · Choose water, low-fat milk, unsweetened iced tea, or other drinks without added sugar. If you want an alcoholic beverage, choose a lower calorie option such as a glass of wine or light beer.  · Ask for dressings, sauces, and syrups on the side. These are usually high in calories, so you should limit the amount you eat.  · If you want a salad, choose a garden salad and ask for grilled meats. Avoid extra toppings like collier, cheese, or fried items. Ask for the dressing on the side, or ask for olive oil and vinegar or lemon to use as dressing.  · Estimate how many servings of a food you are given. For example, a serving of cooked rice is ½ cup or about the size of half a baseball. Knowing serving sizes  will help you be aware of how much food you are eating at restaurants. The list below tells you how big or small some common portion sizes are based on everyday objects:  ¨ 1 oz--4 stacked dice.  ¨ 3 oz--1 deck of cards.  ¨ 1 tsp--1 die.  ¨ 1 Tbsp--½ a ping-pong ball.  ¨ 2 Tbsp--1 ping-pong ball.  ¨ ½ cup--½ baseball.  ¨ 1 cup--1 baseball.  Summary  · Calorie counting means keeping track of how many calories you eat and drink each day. If you eat fewer calories than your body needs, you should lose weight.  · A healthy amount of weight to lose per week is usually 1-2 lb (0.5-0.9 kg). This usually means reducing your daily calorie intake by 500-750 calories.  · The number of calories in a food can be found on a Nutrition Facts label. If a food does not have a Nutrition Facts label, try to look up the calories online or ask your dietitian for help.  · Use your calories on foods and drinks that will fill you up, and not on foods and drinks that will leave you hungry.  · Use smaller plates, glasses, and bowls to prevent overeating.  This information is not intended to replace advice given to you by your health care provider. Make sure you discuss any questions you have with your health care provider.  Document Released: 12/18/2006 Document Revised: 11/17/2017 Document Reviewed: 11/17/2017  [employer] Interactive Patient Education © 2017 [employer]

## 2018-05-03 NOTE — PROGRESS NOTES
Subjective   Jaimie Jeffries is a 62 y.o. female.     Chief Complaint   Patient presents with   • Insomnia     x 1 year         Insomnia   This is a chronic problem. The current episode started more than 1 year ago. The problem occurs constantly. The problem has been unchanged. Pertinent negatives include no fatigue. She has tried nothing for the symptoms.        The following portions of the patient's history were reviewed and updated as appropriate: allergies, current medications, past social history and problem list.    Outpatient Prescriptions Marked as Taking for the 5/3/18 encounter (Office Visit) with Rj Crawford MD   Medication Sig Dispense Refill   • amitriptyline (ELAVIL) 10 MG tablet Take 1 tablet by mouth Daily. 90 tablet 2   • anastrozole (ARIMIDEX) 1 MG tablet Take 1 tablet by mouth Daily. 90 tablet 3   • b complex-C-folic acid 1 MG capsule Take 1 capsule by mouth Daily.     • calcium carbonate-cholecalciferol 500-400 MG-UNIT tablet tablet Take 1 tablet by mouth Daily.     • lidocaine (XYLOCAINE) 2 % jelly APPLY A PEA SIZED AMOUNT TO VULVA PRN  12   • Lutein 10 MG tablet Take  by mouth.     • melatonin 5 MG tablet tablet Take 5 mg by mouth Every Night.     • Multiple Vitamins-Minerals (MULTIVITAL PO) Take  by mouth.     • NON FORMULARY 1 capsule Daily. NHT Nerium         Review of Systems   Constitutional: Negative for fatigue and unexpected weight change.   Respiratory: Negative for shortness of breath and wheezing.    Psychiatric/Behavioral: Positive for sleep disturbance. Negative for dysphoric mood. The patient has insomnia. The patient is not nervous/anxious.        Objective   Vitals:    05/03/18 1533   BP: 154/98   Pulse: 70   Resp: 16   Temp: 99 °F (37.2 °C)      1    05/03/18  1533   Weight: 86.5 kg (190 lb 9.6 oz)    [unfilled]  Body mass index is 29.57 kg/m².      Physical Exam   Constitutional: She appears well-developed and well-nourished.   Cardiovascular: Regular rhythm and  normal heart sounds.  Exam reveals no friction rub.    No murmur heard.  Pulmonary/Chest: Effort normal and breath sounds normal. No respiratory distress. She has no wheezes. She has no rales.         Problem List Items Addressed This Visit     None      Visit Diagnoses     Sleep apnea, unspecified type    -  Primary        Assessment/Plan   In for evaluation of insomnia.  Has a lot of awakenings.  No daytime somnolence.  Does not fall asleep at work or at the wheel of the car.  She snores.  She thinks she may have sleep apnea.  Has tried to lose weight unsuccessfully.  We'll schedule for a sleep study.  Further recommendations to follow.  Nose she needs to lose weight.         Body mass index is 29.57 kg/m².      Dragon disclaimer:   Much of this encounter note is an electronic transcription/translation of spoken language to printed text. The electronic translation of spoken language may permit erroneous, or at times, nonsensical words or phrases to be inadvertently transcribed; Although I have reviewed the note for such errors, some may still exist.

## 2018-05-08 ENCOUNTER — TELEPHONE (OUTPATIENT)
Dept: INTERNAL MEDICINE | Facility: CLINIC | Age: 63
End: 2018-05-08

## 2018-05-08 NOTE — TELEPHONE ENCOUNTER
"[facility] \"Consult scheduled on 6/27/18 at 2:30pm with HOLDEN Velasco. Dr. Tracy is booked out through August for new patients\"    LM to return call  "

## 2018-07-09 ENCOUNTER — OFFICE VISIT (OUTPATIENT)
Dept: SLEEP MEDICINE | Facility: HOSPITAL | Age: 63
End: 2018-07-09
Attending: INTERNAL MEDICINE

## 2018-07-09 VITALS
HEART RATE: 69 BPM | BODY MASS INDEX: 30.29 KG/M2 | HEIGHT: 67 IN | SYSTOLIC BLOOD PRESSURE: 134 MMHG | DIASTOLIC BLOOD PRESSURE: 67 MMHG | WEIGHT: 193 LBS

## 2018-07-09 DIAGNOSIS — R06.81 WITNESSED EPISODE OF APNEA: ICD-10-CM

## 2018-07-09 DIAGNOSIS — R29.818 SUSPECTED SLEEP APNEA: Primary | ICD-10-CM

## 2018-07-09 DIAGNOSIS — R00.2 PALPITATIONS: ICD-10-CM

## 2018-07-09 DIAGNOSIS — R06.83 SNORING: ICD-10-CM

## 2018-07-09 DIAGNOSIS — G47.61 PLMD (PERIODIC LIMB MOVEMENT DISORDER): ICD-10-CM

## 2018-07-09 DIAGNOSIS — E66.9 OBESITY (BMI 30-39.9): ICD-10-CM

## 2018-07-09 NOTE — PROGRESS NOTES
Sleep Consultation    Patient Name: Jaimie Jeffries  Age/Sex: 62 y.o. female  : 1955  MRN: [id number]    Date of Encounter Visit: 2018  Encounter Provider: Irineo Hill MD  Referring Provider: Rj Crawford MD  Place of Service: [facility]  Patient Care Team:  Rj Crawford MD as PCP - General (Internal Medicine)  Rj Crawford MD as PCP - Internal Medicine  Fred Denny MD as Referring Physician (Breast Surgery)  Sangeeta Tom MD as Consulting Physician (Hematology and Oncology)  Jennifer Mcclendon MD as Consulting Physician (Obstetrics and Gynecology)  Andrei Cohen MD (Inactive) as Consulting Physician (Radiation Oncology)  Marlen Christine MD as Consulting Physician (Hematology and Oncology)    Subjective:     Reason for Consult: Suspected sleep apnea    History of Present Illness:  Jaimie Jeffries is a 62 y.o. female is here for evaluation of DEEPALI due to  complains of restless sleep, snoring, witnessed apneas, morning headaches and frequent nocturnal awakenings. reports symptoms started years ago with frequent awakenings at night and nocturia.  Reports overall restless sleep and snoring.  She is on amitriptyline that she uses at night to help with sleep.  She has occasional palpitations.    Please see sleep questionnaire on page 2 for more details.   Denies any daytime fatigue and sleepiness with an Ellis Sleepiness Scale (ESS) of 2.  Patient complains of  reports from her family that she snores loudly and has been told that she stops breathing at night.  She has occasionally woken up gasping for breath at night.  Sometimes she has an early morning headache that occurs with or without drinking wine the night before.  She does complain of leg jerking at night.  Denies any symptoms of restless leg syndrome.   Patient denies any cataplexy, sleep paralysis or other symptoms to suggest narcolepsy.  Patient denies any parasomnias.  Denies any history of  seizure disorder or recent head trauma.    She has frequent awakenings at night with nocturia and averages 2-3 trips to the bathroom per night.  She usually goes to bed around 8:30 to 9 PM and gets up at 6 AM averaging 8-9 hours of sleep.  On the weekend she'll occasionally stay up until 10 PM and get up around 8 AM averaging 9-10 hours of sleep.  She reports that she will rarely have a nap either after work or on the weekends.  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    Patient spends adequate amount of time in bed with no evidence of sleep restriction or improper sleep hygiene.   Comorbidities include palpitations and obesity.    Review of Systems:   A twelve-system review was conducted and was negative.   Please refer to page 4 of on the sleep questionnaire for more details on system review findings.    Past Medical History:  Past Medical History:   Diagnosis Date   • Breast cancer (CMS/HCC)     Right   • Depression    • Hx of radiation therapy    • PONV (postoperative nausea and vomiting)     PREFER SCOPE PATCH,ZOFRAN,PHENERGAN       Past Surgical History:   Procedure Laterality Date   • BREAST BIOPSY Right 2012, 2014    benign   • BREAST BIOPSY Right 2017    malignant   • BREAST LUMPECTOMY WITH SENTINEL NODE BIOPSY Right 2/13/2017    Procedure: BREAST LUMPECTOMY WITH SENTINEL NODE BIOPSY AND NEEDLE LOCALIZATION;  Surgeon: Fred Denny MD;  Location: [facility] OR [facility];  Service:        Home Medications:     Current Outpatient Prescriptions:   •  amitriptyline (ELAVIL) 10 MG tablet, Take 1 tablet by mouth Daily., Disp: 90 tablet, Rfl: 2  •  anastrozole (ARIMIDEX) 1 MG tablet, Take 1 tablet by mouth Daily., Disp: 90 tablet, Rfl: 3  •  b complex-C-folic acid 1 MG capsule, Take 1 capsule by mouth Daily., Disp: , Rfl:   •  calcium carbonate-cholecalciferol 500-400 MG-UNIT tablet tablet, Take 1 tablet by mouth Daily., Disp: , Rfl:   •  lidocaine (XYLOCAINE) 2 % jelly, APPLY A PEA SIZED  "AMOUNT TO VULVA PRN, Disp: , Rfl: 12  •  Lutein 10 MG tablet, Take  by mouth., Disp: , Rfl:   •  melatonin 5 MG tablet tablet, Take 5 mg by mouth Every Night., Disp: , Rfl:   •  Multiple Vitamins-Minerals (MULTIVITAL PO), Take  by mouth., Disp: , Rfl:   •  NON FORMULARY, 1 capsule Daily. NHT Nerium, Disp: , Rfl:   •  venlafaxine XR (EFFEXOR-XR) 75 MG 24 hr capsule, Take 1 capsule by mouth Daily., Disp: 90 capsule, Rfl: 2    Allergies:  No Known Allergies    Past Social History:  Social History     Social History   • Marital status:    • Number of children: 4   • Years of education: College     Occupational History   • RN [employer]     Social History Main Topics   • Smoking status: Never Smoker   • Smokeless tobacco: Never Used   • Alcohol use 1.2 oz/week     2 Glasses of wine per week   • Drug use: No   • Sexual activity: Yes      Comment:      Other Topics Concern   • Not on file     Social History Narrative    Accompanied by friend Ryan       Past Family History:  Family History   Problem Relation Age of Onset   • Breast cancer Mother 70   • Hearing loss Mother    • Stroke Mother    • Breast cancer Sister 50   • Hypertension Sister    • Hearing loss Father    • Hyperlipidemia Father    • Hypertension Father    • Kidney cancer Sister    • Hypertension Brother    • Stomach cancer Sister        Objective:   Done and documented on sleep disorders center physical examination sheet, please refer to the hand written note on records for details about the pertinent negative findings.    Vital Signs:   Visit Vitals  /67   Pulse 69   Ht 168.9 cm (66.5\")   Wt 87.5 kg (193 lb)   BMI 30.68 kg/m²     Wt Readings from Last 3 Encounters:   07/09/18 87.5 kg (193 lb)   05/03/18 86.5 kg (190 lb 9.6 oz)   12/21/17 86.9 kg (191 lb 9.6 oz)     Neck Circumference: 15.25 inches    Physical Exam:   General: AAOx3. Normal mood and affect.   HEENT:  Conjunctiva normal.  PERRLA.  Moist mucous " membranes.  Septum midline. Mallampati 4 airway. Edematous uvula.   Neck: Neck supple. Trachea midline.  Normal thyroid.   LUNGS: Non-labored breathing. CTAB.  No wheezing.  HEART: regular rate and rhythm. No murmur. Normal s1/s2.  EXTREMITIES: no edema. No cyanosis or clubbing. Normal gait.    Diagnostic Data:  none    Assessment and Plan:       ICD-10-CM ICD-9-CM   1. Suspected sleep apnea G47.30 780.57   2. Obesity (BMI 30-39.9) E66.9 278.00   3. Palpitations R00.2 785.1   4. Snoring R06.83 786.09   5. Witnessed episode of apnea R06.81 786.03   6. PLMD (periodic limb movement disorder) G47.61 327.51       Recommendations:   Patient was educated in depth about DEEPALI and cardiovascular consequences if left untreated, including but not limited to CHF, CAD, arrhythmias, CVA, and/or HTN. Education also provided about the diagnostic tools for DEEPALI, including the polysomnography and the treatment modalities, including the CPAP.     If patient has a mild obstructive sleep apnea will schedule a follow up to discuss treatment options including mandibular advancement device versus CPAP.      If patient has moderate to severe sleep apnea the recommend treatment is CPAP and will start CPAP and patient will follow up within 31-90 days after starting CPAP for compliance review.     Adherence to the CPAP is a key factor in successful treatment of DEEPALI and the patient was encouraged to contact us in case of problem with the CPAP or the mask that can limit the tolerance of the compliance with the therapy.    She does have frequent leg jerkings at night and need to have an in lab sleep study to assess for PLMD.    We did discuss the surgical options for DEEPALI, however that will be only if she does have severe DEEPALI that did not respond or was intolerant to the CPAP.      Orders Placed This Encounter   Procedures   • Polysomnography 4 or More Parameters       Return for Follow up after study.    Irnieo Hill MD   [facility]    07/09/18  5:56 PM    Dictated utilizing Dragon dictation

## 2018-08-22 ENCOUNTER — APPOINTMENT (OUTPATIENT)
Dept: GENERAL RADIOLOGY | Facility: HOSPITAL | Age: 63
End: 2018-08-22

## 2018-08-22 PROCEDURE — 73590 X-RAY EXAM OF LOWER LEG: CPT | Performed by: FAMILY MEDICINE

## 2018-08-22 PROCEDURE — 73610 X-RAY EXAM OF ANKLE: CPT | Performed by: FAMILY MEDICINE

## 2018-08-24 ENCOUNTER — HOSPITAL ENCOUNTER (OUTPATIENT)
Dept: SLEEP MEDICINE | Facility: HOSPITAL | Age: 63
Discharge: HOME | End: 2018-08-24
Attending: INTERNAL MEDICINE | Admitting: INTERNAL MEDICINE

## 2018-08-24 DIAGNOSIS — R06.83 SNORING: ICD-10-CM

## 2018-08-24 DIAGNOSIS — R00.2 PALPITATIONS: ICD-10-CM

## 2018-08-24 DIAGNOSIS — E66.9 OBESITY (BMI 30-39.9): ICD-10-CM

## 2018-08-24 DIAGNOSIS — R06.81 WITNESSED EPISODE OF APNEA: ICD-10-CM

## 2018-08-24 DIAGNOSIS — R29.818 SUSPECTED SLEEP APNEA: ICD-10-CM

## 2018-08-24 DIAGNOSIS — G47.61 PLMD (PERIODIC LIMB MOVEMENT DISORDER): ICD-10-CM

## 2018-08-24 PROCEDURE — 95811 POLYSOM 6/>YRS CPAP 4/> PARM: CPT

## 2018-08-27 ENCOUNTER — ANESTHESIA EVENT (OUTPATIENT)
Dept: PERIOP | Facility: HOSPITAL | Age: 63
End: 2018-08-27

## 2018-08-27 ENCOUNTER — HOSPITAL ENCOUNTER (OUTPATIENT)
Facility: HOSPITAL | Age: 63
Setting detail: HOSPITAL OUTPATIENT SURGERY
Discharge: HOME | End: 2018-08-27
Attending: ORTHOPAEDIC SURGERY | Admitting: ORTHOPAEDIC SURGERY

## 2018-08-27 ENCOUNTER — ANESTHESIA (OUTPATIENT)
Dept: PERIOP | Facility: HOSPITAL | Age: 63
End: 2018-08-27

## 2018-08-27 ENCOUNTER — APPOINTMENT (OUTPATIENT)
Dept: GENERAL RADIOLOGY | Facility: HOSPITAL | Age: 63
End: 2018-08-27

## 2018-08-27 VITALS
HEART RATE: 76 BPM | TEMPERATURE: 98.3 F | SYSTOLIC BLOOD PRESSURE: 154 MMHG | OXYGEN SATURATION: 98 % | DIASTOLIC BLOOD PRESSURE: 90 MMHG | RESPIRATION RATE: 16 BRPM

## 2018-08-27 PROCEDURE — C1713 ANCHOR/SCREW BN/BN,TIS/BN: HCPCS | Performed by: ORTHOPAEDIC SURGERY

## 2018-08-27 PROCEDURE — 25010000002 MIDAZOLAM PER 1 MG: Performed by: ANESTHESIOLOGY

## 2018-08-27 PROCEDURE — 25010000002 VANCOMYCIN PER 500 MG: Performed by: ORTHOPAEDIC SURGERY

## 2018-08-27 PROCEDURE — 25010000002 PROPOFOL 10 MG/ML EMULSION: Performed by: NURSE ANESTHETIST, CERTIFIED REGISTERED

## 2018-08-27 PROCEDURE — 25010000002 ROPIVACAINE PER 1 MG: Performed by: ANESTHESIOLOGY

## 2018-08-27 PROCEDURE — 25010000003 MEPIVACAINE PER 10 ML: Performed by: ANESTHESIOLOGY

## 2018-08-27 PROCEDURE — 25010000002 FENTANYL CITRATE (PF) 100 MCG/2ML SOLUTION: Performed by: NURSE ANESTHETIST, CERTIFIED REGISTERED

## 2018-08-27 PROCEDURE — 73610 X-RAY EXAM OF ANKLE: CPT

## 2018-08-27 PROCEDURE — 25010000002 DEXAMETHASONE PER 1 MG: Performed by: NURSE ANESTHETIST, CERTIFIED REGISTERED

## 2018-08-27 PROCEDURE — 25010000002 FENTANYL CITRATE (PF) 100 MCG/2ML SOLUTION: Performed by: ANESTHESIOLOGY

## 2018-08-27 PROCEDURE — 76000 FLUOROSCOPY <1 HR PHYS/QHP: CPT

## 2018-08-27 DEVICE — SCRW MAXLOCK EXTRM NL 4X16MM: Type: IMPLANTABLE DEVICE | Site: ANKLE | Status: FUNCTIONAL

## 2018-08-27 DEVICE — IMPLANTABLE DEVICE: Type: IMPLANTABLE DEVICE | Site: ANKLE | Status: FUNCTIONAL

## 2018-08-27 DEVICE — SCRW MAXLOCK EXTRM NL 4X14MM: Type: IMPLANTABLE DEVICE | Site: ANKLE | Status: FUNCTIONAL

## 2018-08-27 RX ORDER — PROPOFOL 10 MG/ML
VIAL (ML) INTRAVENOUS AS NEEDED
Status: DISCONTINUED | OUTPATIENT
Start: 2018-08-27 | End: 2018-08-27 | Stop reason: SURG

## 2018-08-27 RX ORDER — VANCOMYCIN HYDROCHLORIDE 1 G/200ML
1 INJECTION, SOLUTION INTRAVENOUS ONCE
Status: COMPLETED | OUTPATIENT
Start: 2018-08-27 | End: 2018-08-27

## 2018-08-27 RX ORDER — ONDANSETRON 2 MG/ML
4 INJECTION INTRAMUSCULAR; INTRAVENOUS ONCE AS NEEDED
Status: DISCONTINUED | OUTPATIENT
Start: 2018-08-27 | End: 2018-08-27 | Stop reason: HOSPADM

## 2018-08-27 RX ORDER — PROMETHAZINE HYDROCHLORIDE 25 MG/ML
12.5 INJECTION, SOLUTION INTRAMUSCULAR; INTRAVENOUS ONCE AS NEEDED
Status: DISCONTINUED | OUTPATIENT
Start: 2018-08-27 | End: 2018-08-27 | Stop reason: HOSPADM

## 2018-08-27 RX ORDER — LABETALOL HYDROCHLORIDE 5 MG/ML
5 INJECTION, SOLUTION INTRAVENOUS
Status: DISCONTINUED | OUTPATIENT
Start: 2018-08-27 | End: 2018-08-27 | Stop reason: HOSPADM

## 2018-08-27 RX ORDER — SODIUM CHLORIDE 0.9 % (FLUSH) 0.9 %
1-10 SYRINGE (ML) INJECTION AS NEEDED
Status: DISCONTINUED | OUTPATIENT
Start: 2018-08-27 | End: 2018-08-27 | Stop reason: HOSPADM

## 2018-08-27 RX ORDER — DIPHENHYDRAMINE HYDROCHLORIDE 50 MG/ML
12.5 INJECTION INTRAMUSCULAR; INTRAVENOUS
Status: DISCONTINUED | OUTPATIENT
Start: 2018-08-27 | End: 2018-08-27 | Stop reason: HOSPADM

## 2018-08-27 RX ORDER — DEXAMETHASONE SODIUM PHOSPHATE 10 MG/ML
INJECTION INTRAMUSCULAR; INTRAVENOUS AS NEEDED
Status: DISCONTINUED | OUTPATIENT
Start: 2018-08-27 | End: 2018-08-27 | Stop reason: SURG

## 2018-08-27 RX ORDER — PROMETHAZINE HYDROCHLORIDE 25 MG/1
25 SUPPOSITORY RECTAL ONCE AS NEEDED
Status: DISCONTINUED | OUTPATIENT
Start: 2018-08-27 | End: 2018-08-27 | Stop reason: HOSPADM

## 2018-08-27 RX ORDER — EPHEDRINE SULFATE 50 MG/ML
INJECTION, SOLUTION INTRAVENOUS AS NEEDED
Status: DISCONTINUED | OUTPATIENT
Start: 2018-08-27 | End: 2018-08-27 | Stop reason: SURG

## 2018-08-27 RX ORDER — PROMETHAZINE HYDROCHLORIDE 25 MG/1
12.5 TABLET ORAL ONCE AS NEEDED
Status: DISCONTINUED | OUTPATIENT
Start: 2018-08-27 | End: 2018-08-27 | Stop reason: HOSPADM

## 2018-08-27 RX ORDER — MORPHINE SULFATE 2 MG/ML
2 INJECTION, SOLUTION INTRAMUSCULAR; INTRAVENOUS
Status: DISCONTINUED | OUTPATIENT
Start: 2018-08-27 | End: 2018-08-27 | Stop reason: HOSPADM

## 2018-08-27 RX ORDER — ROPIVACAINE HYDROCHLORIDE 2 MG/ML
INJECTION, SOLUTION EPIDURAL; INFILTRATION; PERINEURAL AS NEEDED
Status: DISCONTINUED | OUTPATIENT
Start: 2018-08-27 | End: 2018-08-27 | Stop reason: SURG

## 2018-08-27 RX ORDER — KETOROLAC TROMETHAMINE 15 MG/ML
15 INJECTION, SOLUTION INTRAMUSCULAR; INTRAVENOUS EVERY 8 HOURS
Status: DISCONTINUED | OUTPATIENT
Start: 2018-08-27 | End: 2018-08-27 | Stop reason: HOSPADM

## 2018-08-27 RX ORDER — LIDOCAINE HYDROCHLORIDE 10 MG/ML
0.5 INJECTION, SOLUTION EPIDURAL; INFILTRATION; INTRACAUDAL; PERINEURAL ONCE AS NEEDED
Status: DISCONTINUED | OUTPATIENT
Start: 2018-08-27 | End: 2018-08-27 | Stop reason: HOSPADM

## 2018-08-27 RX ORDER — SCOLOPAMINE TRANSDERMAL SYSTEM 1 MG/1
1 PATCH, EXTENDED RELEASE TRANSDERMAL
Status: DISCONTINUED | OUTPATIENT
Start: 2018-08-27 | End: 2018-08-27 | Stop reason: HOSPADM

## 2018-08-27 RX ORDER — OXYCODONE HYDROCHLORIDE AND ACETAMINOPHEN 5; 325 MG/1; MG/1
1 TABLET ORAL EVERY 4 HOURS PRN
Status: DISCONTINUED | OUTPATIENT
Start: 2018-08-27 | End: 2018-08-27 | Stop reason: HOSPADM

## 2018-08-27 RX ORDER — NALOXONE HCL 0.4 MG/ML
0.2 VIAL (ML) INJECTION AS NEEDED
Status: DISCONTINUED | OUTPATIENT
Start: 2018-08-27 | End: 2018-08-27 | Stop reason: HOSPADM

## 2018-08-27 RX ORDER — EPHEDRINE SULFATE 50 MG/ML
5 INJECTION, SOLUTION INTRAVENOUS ONCE AS NEEDED
Status: DISCONTINUED | OUTPATIENT
Start: 2018-08-27 | End: 2018-08-27 | Stop reason: HOSPADM

## 2018-08-27 RX ORDER — PROMETHAZINE HYDROCHLORIDE 25 MG/1
25 TABLET ORAL ONCE AS NEEDED
Status: DISCONTINUED | OUTPATIENT
Start: 2018-08-27 | End: 2018-08-27 | Stop reason: HOSPADM

## 2018-08-27 RX ORDER — CEFAZOLIN SODIUM 2 G/100ML
2 INJECTION, SOLUTION INTRAVENOUS ONCE
Status: DISCONTINUED | OUTPATIENT
Start: 2018-08-27 | End: 2018-08-27

## 2018-08-27 RX ORDER — MIDAZOLAM HYDROCHLORIDE 1 MG/ML
1 INJECTION INTRAMUSCULAR; INTRAVENOUS
Status: DISCONTINUED | OUTPATIENT
Start: 2018-08-27 | End: 2018-08-27 | Stop reason: HOSPADM

## 2018-08-27 RX ORDER — ROPIVACAINE HYDROCHLORIDE 5 MG/ML
INJECTION, SOLUTION EPIDURAL; INFILTRATION; PERINEURAL AS NEEDED
Status: DISCONTINUED | OUTPATIENT
Start: 2018-08-27 | End: 2018-08-27 | Stop reason: SURG

## 2018-08-27 RX ORDER — FENTANYL CITRATE 50 UG/ML
50 INJECTION, SOLUTION INTRAMUSCULAR; INTRAVENOUS
Status: DISCONTINUED | OUTPATIENT
Start: 2018-08-27 | End: 2018-08-27 | Stop reason: HOSPADM

## 2018-08-27 RX ORDER — OXYCODONE AND ACETAMINOPHEN 7.5; 325 MG/1; MG/1
1 TABLET ORAL ONCE AS NEEDED
Status: DISCONTINUED | OUTPATIENT
Start: 2018-08-27 | End: 2018-08-27 | Stop reason: HOSPADM

## 2018-08-27 RX ORDER — FLUMAZENIL 0.1 MG/ML
0.2 INJECTION INTRAVENOUS AS NEEDED
Status: DISCONTINUED | OUTPATIENT
Start: 2018-08-27 | End: 2018-08-27 | Stop reason: HOSPADM

## 2018-08-27 RX ORDER — HYDROCODONE BITARTRATE AND ACETAMINOPHEN 7.5; 325 MG/1; MG/1
1 TABLET ORAL ONCE AS NEEDED
Status: DISCONTINUED | OUTPATIENT
Start: 2018-08-27 | End: 2018-08-27 | Stop reason: HOSPADM

## 2018-08-27 RX ORDER — MIDAZOLAM HYDROCHLORIDE 1 MG/ML
2 INJECTION INTRAMUSCULAR; INTRAVENOUS
Status: DISCONTINUED | OUTPATIENT
Start: 2018-08-27 | End: 2018-08-27 | Stop reason: HOSPADM

## 2018-08-27 RX ORDER — FAMOTIDINE 10 MG/ML
20 INJECTION, SOLUTION INTRAVENOUS ONCE
Status: COMPLETED | OUTPATIENT
Start: 2018-08-27 | End: 2018-08-27

## 2018-08-27 RX ORDER — LIDOCAINE HYDROCHLORIDE 20 MG/ML
INJECTION, SOLUTION INFILTRATION; PERINEURAL AS NEEDED
Status: DISCONTINUED | OUTPATIENT
Start: 2018-08-27 | End: 2018-08-27 | Stop reason: SURG

## 2018-08-27 RX ORDER — FENTANYL CITRATE 50 UG/ML
INJECTION, SOLUTION INTRAMUSCULAR; INTRAVENOUS AS NEEDED
Status: DISCONTINUED | OUTPATIENT
Start: 2018-08-27 | End: 2018-08-27 | Stop reason: SURG

## 2018-08-27 RX ORDER — SODIUM CHLORIDE, SODIUM LACTATE, POTASSIUM CHLORIDE, CALCIUM CHLORIDE 600; 310; 30; 20 MG/100ML; MG/100ML; MG/100ML; MG/100ML
9 INJECTION, SOLUTION INTRAVENOUS CONTINUOUS
Status: DISCONTINUED | OUTPATIENT
Start: 2018-08-27 | End: 2018-08-27 | Stop reason: HOSPADM

## 2018-08-27 RX ADMIN — LIDOCAINE HYDROCHLORIDE 50 MG: 20 INJECTION, SOLUTION INFILTRATION; PERINEURAL at 13:45

## 2018-08-27 RX ADMIN — ROPIVACAINE HYDROCHLORIDE 20 ML: 5 INJECTION, SOLUTION EPIDURAL; INFILTRATION; PERINEURAL at 12:26

## 2018-08-27 RX ADMIN — FENTANYL CITRATE 25 MCG: 50 INJECTION INTRAMUSCULAR; INTRAVENOUS at 13:45

## 2018-08-27 RX ADMIN — DEXAMETHASONE SODIUM PHOSPHATE 8 MG: 10 INJECTION INTRAMUSCULAR; INTRAVENOUS at 13:55

## 2018-08-27 RX ADMIN — EPHEDRINE SULFATE 10 MG: 50 INJECTION INTRAMUSCULAR; INTRAVENOUS; SUBCUTANEOUS at 14:20

## 2018-08-27 RX ADMIN — ROPIVACAINE HYDROCHLORIDE 15 ML: 2 INJECTION, SOLUTION EPIDURAL; INFILTRATION at 12:26

## 2018-08-27 RX ADMIN — EPHEDRINE SULFATE 10 MG: 50 INJECTION INTRAMUSCULAR; INTRAVENOUS; SUBCUTANEOUS at 14:00

## 2018-08-27 RX ADMIN — FENTANYL CITRATE 100 MCG: 50 INJECTION INTRAMUSCULAR; INTRAVENOUS at 12:52

## 2018-08-27 RX ADMIN — EPHEDRINE SULFATE 10 MG: 50 INJECTION INTRAMUSCULAR; INTRAVENOUS; SUBCUTANEOUS at 14:10

## 2018-08-27 RX ADMIN — ROPIVACAINE HYDROCHLORIDE 8 ML/HR: 2 INJECTION, SOLUTION EPIDURAL; INFILTRATION at 15:20

## 2018-08-27 RX ADMIN — SCOPALAMINE 1 PATCH: 1 PATCH, EXTENDED RELEASE TRANSDERMAL at 12:36

## 2018-08-27 RX ADMIN — FAMOTIDINE 20 MG: 10 INJECTION, SOLUTION INTRAVENOUS at 12:36

## 2018-08-27 RX ADMIN — MEPIVACAINE HYDROCHLORIDE 10 ML: 15 INJECTION, SOLUTION EPIDURAL; INFILTRATION at 12:26

## 2018-08-27 RX ADMIN — MIDAZOLAM HYDROCHLORIDE 2 MG: 2 INJECTION, SOLUTION INTRAMUSCULAR; INTRAVENOUS at 12:52

## 2018-08-27 RX ADMIN — MIDAZOLAM HYDROCHLORIDE 2 MG: 2 INJECTION, SOLUTION INTRAMUSCULAR; INTRAVENOUS at 13:11

## 2018-08-27 RX ADMIN — SODIUM CHLORIDE, POTASSIUM CHLORIDE, SODIUM LACTATE AND CALCIUM CHLORIDE: 600; 310; 30; 20 INJECTION, SOLUTION INTRAVENOUS at 13:44

## 2018-08-27 RX ADMIN — VANCOMYCIN HYDROCHLORIDE 1 G: 1 INJECTION, SOLUTION INTRAVENOUS at 13:34

## 2018-08-27 RX ADMIN — PROPOFOL 200 MG: 10 INJECTION, EMULSION INTRAVENOUS at 13:45

## 2018-08-27 RX ADMIN — SODIUM CHLORIDE, POTASSIUM CHLORIDE, SODIUM LACTATE AND CALCIUM CHLORIDE: 600; 310; 30; 20 INJECTION, SOLUTION INTRAVENOUS at 13:41

## 2018-08-27 RX ADMIN — SODIUM CHLORIDE, POTASSIUM CHLORIDE, SODIUM LACTATE AND CALCIUM CHLORIDE 9 ML/HR: 600; 310; 30; 20 INJECTION, SOLUTION INTRAVENOUS at 12:36

## 2018-08-27 NOTE — ANESTHESIA PREPROCEDURE EVALUATION
Anesthesia Evaluation     Patient summary reviewed and Nursing notes reviewed   history of anesthetic complications: PONV               Airway   Mallampati: II  Dental      Pulmonary - negative pulmonary ROS   Cardiovascular - negative cardio ROS    ECG reviewed  Rhythm: regular  Rate: normal        Neuro/Psych  (+) psychiatric history Depression,     GI/Hepatic/Renal/Endo    (+) obesity,       Musculoskeletal (-) negative ROS    Abdominal    Substance History - negative use     OB/GYN negative ob/gyn ROS         Other      history of cancer remission                    Anesthesia Plan    ASA 2     general   (Right Femoral block &  Right pop catheter/block)  intravenous induction   Anesthetic plan and risks discussed with patient.

## 2018-08-27 NOTE — ANESTHESIA POSTPROCEDURE EVALUATION
Patient: Jaimie Jeffries    Procedure Summary     Date:  08/27/18 Room / Location:   [facility]    Anesthesia Start:  1341 Anesthesia Stop:  1505    Procedure:  OPEN REDUCTION INTERNAL FIXATION DISLOCATED RT ANKLE FRACTURE (Right Ankle) Diagnosis:      Surgeon:  Cristian Qureshi MD Provider:  Que Reese MD    Anesthesia Type:  general ASA Status:  2          Anesthesia Type: general  Last vitals  BP   (!) 174/102 (08/27/18 1515)   Temp       Pulse   75 (08/27/18 1515)   Resp   16 (08/27/18 1515)     SpO2   97 % (08/27/18 1515)     Post Anesthesia Care and Evaluation    Patient location during evaluation: PACU  Patient participation: complete - patient participated  Level of consciousness: awake and alert  Pain management: adequate  Airway patency: patent  Anesthetic complications: No anesthetic complications    Cardiovascular status: acceptable  Respiratory status: acceptable  Hydration status: acceptable    Comments: ---------------------            08/27/18 1515      ---------------------   BP:     (!) 174/102   Pulse:      75        Resp:       16        SpO2:       97%      ---------------------

## 2018-08-27 NOTE — ANESTHESIA PROCEDURE NOTES
Peripheral Block    Patient location during procedure: pre-op  Start time: 8/27/2018 12:26 PM  Stop time: 8/27/2018 12:36 PM  Reason for block: at surgeon's request and post-op pain management  Performed by  Anesthesiologist: BERTA MURRIETA  Preanesthetic Checklist  Completed: patient identified, site marked, surgical consent, pre-op evaluation, timeout performed, IV checked, risks and benefits discussed and monitors and equipment checked  Prep:  Sterile barriers:cap, gloves, gown, mask and sterile barriers  Prep: ChloraPrep  Patient monitoring: blood pressure monitoring, continuous pulse oximetry and EKG  Procedure  Sedation:yes    Guidance:ultrasound guided  ULTRASOUND INTERPRETATION.  Using ultrasound guidance a 21 G gauge needle was placed in close proximity to the femoral nerve, at which point, under ultrasound guidance anesthetic was injected in the area of the nerve and spread of the anesthesia was seen on ultrasound in close proximity thereto.  There were no abnormalities seen on ultrasound; a digital image was taken; and the patient tolerated the procedure with no complications. Images:still images obtained    Laterality:right  Block Type:adductor canal block  Injection Technique:single-shot  Needle Type:echogenic  Needle Gauge:21 G    Medications  Local Injected:ropivacaine 0.2% and 1.5% Mepivacaine  Post Assessment  Injection Assessment: negative aspiration for heme, no paresthesia on injection and incremental injection  Patient Tolerance:comfortable throughout block  Complications:no

## 2018-08-27 NOTE — ANESTHESIA PROCEDURE NOTES
Airway  Urgency: elective    Airway not difficult    General Information and Staff    Patient location during procedure: OR  Anesthesiologist: BERTA MURRIETA  CRNA: KEYSHA ROSAS    Indications and Patient Condition  Indications for airway management: airway protection    Preoxygenated: yes  MILS maintained throughout  Mask difficulty assessment: 1 - vent by mask    Final Airway Details  Final airway type: supraglottic airway      Successful airway: classic  Size 4    Number of attempts at approach: 1    Additional Comments  Pt preoxygenated, sivi, LMA placed with adequate seal and TV

## 2018-08-27 NOTE — ANESTHESIA PROCEDURE NOTES
Peripheral Block    Patient location during procedure: pre-op  Start time: 8/27/2018 12:36 PM  Stop time: 8/27/2018 12:56 PM  Reason for block: at surgeon's request and post-op pain management  Performed by  Anesthesiologist: BERTA MURRIETA  Preanesthetic Checklist  Completed: patient identified, site marked, surgical consent, pre-op evaluation, timeout performed, IV checked, risks and benefits discussed and monitors and equipment checked  Prep:  Sterile barriers:cap, gloves, gown, mask and sterile barriers  Prep: ChloraPrep  Patient monitoring: blood pressure monitoring, continuous pulse oximetry and EKG  Procedure  Sedation:yes    Guidance:ultrasound guided  ULTRASOUND INTERPRETATION.  Using ultrasound guidance a 21 G gauge needle was placed in close proximity to the sciatic nerve, at which point, under ultrasound guidance anesthetic was injected in the area of the nerve and spread of the anesthesia was seen on ultrasound in close proximity thereto.  There were no abnormalities seen on ultrasound; a digital image was taken; and the patient tolerated the procedure with no complications. Images:still images obtained    Laterality:right  Block Type:popliteal  Injection Technique:catheter  Needle Type:echogenic  Needle Gauge:21 G    Medications  Local Injected:ropivacaine 0.5% and 1.5% Mepivacaine  Post Assessment  Injection Assessment: negative aspiration for heme, no paresthesia on injection and incremental injection  Patient Tolerance:comfortable throughout block  Complications:no

## 2018-09-17 ENCOUNTER — OFFICE VISIT (OUTPATIENT)
Dept: ONCOLOGY | Facility: CLINIC | Age: 63
End: 2018-09-17

## 2018-09-17 ENCOUNTER — LAB (OUTPATIENT)
Dept: LAB | Facility: HOSPITAL | Age: 63
End: 2018-09-17

## 2018-09-17 VITALS
DIASTOLIC BLOOD PRESSURE: 90 MMHG | RESPIRATION RATE: 16 BRPM | HEART RATE: 82 BPM | SYSTOLIC BLOOD PRESSURE: 138 MMHG | TEMPERATURE: 99 F | OXYGEN SATURATION: 96 %

## 2018-09-17 DIAGNOSIS — Z17.0 MALIGNANT NEOPLASM OF OVERLAPPING SITES OF RIGHT BREAST IN FEMALE, ESTROGEN RECEPTOR POSITIVE (HCC): Primary | ICD-10-CM

## 2018-09-17 DIAGNOSIS — Z12.31 ENCOUNTER FOR SCREENING MAMMOGRAM FOR BREAST CANCER: ICD-10-CM

## 2018-09-17 DIAGNOSIS — C50.811 MALIGNANT NEOPLASM OF OVERLAPPING SITES OF RIGHT BREAST IN FEMALE, ESTROGEN RECEPTOR POSITIVE (HCC): Primary | ICD-10-CM

## 2018-09-17 DIAGNOSIS — M85.80 OSTEOPENIA, UNSPECIFIED LOCATION: ICD-10-CM

## 2018-09-17 DIAGNOSIS — Z78.0 POST-MENOPAUSAL: ICD-10-CM

## 2018-09-17 LAB
BASOPHILS # BLD AUTO: 0.03 10*3/MM3 (ref 0–0.1)
BASOPHILS NFR BLD AUTO: 0.6 % (ref 0–1.1)
DEPRECATED RDW RBC AUTO: 40.4 FL (ref 37–49)
EOSINOPHIL # BLD AUTO: 0.13 10*3/MM3 (ref 0–0.36)
EOSINOPHIL NFR BLD AUTO: 2.5 % (ref 1–5)
ERYTHROCYTE [DISTWIDTH] IN BLOOD BY AUTOMATED COUNT: 11.9 % (ref 11.7–14.5)
HCT VFR BLD AUTO: 37.5 % (ref 34–45)
HGB BLD-MCNC: 12.6 G/DL (ref 11.5–14.9)
IMM GRANULOCYTES # BLD: 0.01 10*3/MM3 (ref 0–0.03)
IMM GRANULOCYTES NFR BLD: 0.2 % (ref 0–0.5)
LYMPHOCYTES # BLD AUTO: 2.14 10*3/MM3 (ref 1–3.5)
LYMPHOCYTES NFR BLD AUTO: 40.7 % (ref 20–49)
MCH RBC QN AUTO: 30.9 PG (ref 27–33)
MCHC RBC AUTO-ENTMCNC: 33.6 G/DL (ref 32–35)
MCV RBC AUTO: 91.9 FL (ref 83–97)
MONOCYTES # BLD AUTO: 0.36 10*3/MM3 (ref 0.25–0.8)
MONOCYTES NFR BLD AUTO: 6.8 % (ref 4–12)
NEUTROPHILS # BLD AUTO: 2.59 10*3/MM3 (ref 1.5–7)
NEUTROPHILS NFR BLD AUTO: 49.2 % (ref 39–75)
NRBC BLD MANUAL-RTO: 0 /100 WBC (ref 0–0)
PLATELET # BLD AUTO: 278 10*3/MM3 (ref 150–375)
PMV BLD AUTO: 9.2 FL (ref 8.9–12.1)
RBC # BLD AUTO: 4.08 10*6/MM3 (ref 3.9–5)
WBC NRBC COR # BLD: 5.26 10*3/MM3 (ref 4–10)

## 2018-09-17 PROCEDURE — 36415 COLL VENOUS BLD VENIPUNCTURE: CPT | Performed by: INTERNAL MEDICINE

## 2018-09-17 PROCEDURE — 99213 OFFICE O/P EST LOW 20 MIN: CPT | Performed by: INTERNAL MEDICINE

## 2018-09-17 PROCEDURE — 85025 COMPLETE CBC W/AUTO DIFF WBC: CPT | Performed by: INTERNAL MEDICINE

## 2018-09-17 NOTE — PROGRESS NOTES
Subjective     CHIEF COMPLAINT:    1. Stage [id number] right breast, upper outer quadrant, invasive ductal carcinoma, grade 2 with associated ductal carcinoma in situ. ER/UT 90%, Her2 negative    HISTORY OF PRESENT ILLNESS:     Jaimie Jeffries is a 62 y.o. female patient with the above medical history.  Patient remains on Arimidex which she tolerates well since she's been on Effexor. Had mammogram in February that was benign. Recently fractured ankle and required ORIF of right ankle. Ortho notes reviewed.  Still has hot flashes but tolerable. Unfortunately she has poor sex drive since Arimidex.     Reviewed, confirmed and updated history (past medical, social and family)   Past Medical History:   Diagnosis Date   • Breast cancer (CMS/HCC)     Right   • Depression    • Hx of radiation therapy    • PONV (postoperative nausea and vomiting)     PREFER SCOPE PATCH,ZOFRAN,PHENERGAN       Past Surgical History:   Procedure Laterality Date   • ANKLE OPEN REDUCTION INTERNAL FIXATION Right 8/27/2018    Procedure: OPEN REDUCTION INTERNAL FIXATION DISLOCATED RT ANKLE FRACTURE;  Surgeon: Cristian Qureshi MD;  Location: [facility] [place] [facility] [place];  Service: Orthopedics   • BREAST BIOPSY Right 2012, 2014    benign   • BREAST BIOPSY Right 2017    malignant   • BREAST LUMPECTOMY WITH SENTINEL NODE BIOPSY Right 2/13/2017    Procedure: BREAST LUMPECTOMY WITH SENTINEL NODE BIOPSY AND NEEDLE LOCALIZATION;  Surgeon: Fred Denny MD;  Location: [facility] [place];  Service:        Cancer-related family history includes Breast cancer (age of onset: 50) in her sister; Breast cancer (age of onset: 70) in her mother; Kidney cancer in her sister; Stomach cancer in her sister.  Social History   Substance Use Topics   • Smoking status: Never Smoker   • Smokeless tobacco: Never Used   • Alcohol use 1.2 oz/week     2 Glasses of wine per week       MEDICATIONS:    Current Outpatient Prescriptions:   •  amitriptyline (ELAVIL) 10 MG tablet, Take 1 tablet by  mouth Daily., Disp: 90 tablet, Rfl: 2  •  anastrozole (ARIMIDEX) 1 MG tablet, Take 1 tablet by mouth Daily., Disp: 90 tablet, Rfl: 3  •  b complex-C-folic acid 1 MG capsule, Take 1 capsule by mouth Daily., Disp: , Rfl:   •  calcium carbonate-cholecalciferol 500-400 MG-UNIT tablet tablet, Take 1 tablet by mouth Daily., Disp: , Rfl:   •  lidocaine (XYLOCAINE) 2 % jelly, APPLY A PEA SIZED AMOUNT TO VULVA PRN  PATIENT HAS NEVER USED, Disp: , Rfl: 12  •  Lutein 10 MG tablet, Take  by mouth., Disp: , Rfl:   •  melatonin 5 MG tablet tablet, Take 5 mg by mouth Every Night., Disp: , Rfl:   •  Multiple Vitamins-Minerals (MULTIVITAL PO), Take  by mouth., Disp: , Rfl:   •  NON FORMULARY, 1 capsule Daily. NHT Nerium, Disp: , Rfl:   •  venlafaxine XR (EFFEXOR-XR) 75 MG 24 hr capsule, Take 1 capsule by mouth Daily., Disp: 90 capsule, Rfl: 2  •  HYDROcodone-acetaminophen (NORCO) 5-325 MG per tablet, Take 1 tablet by mouth Every 8 (Eight) Hours As Needed for Severe Pain ., Disp: 12 tablet, Rfl: 0    ALLERGIES:  No Known Allergies    REVIEW OF SYSTEMS:  Review of Systems   Constitutional: Negative for activity change, appetite change, chills, diaphoresis, fatigue, fever and unexpected weight change.   HENT: Negative.    Eyes: Negative.    Respiratory: Negative for cough, chest tightness and shortness of breath.    Cardiovascular: Negative for chest pain, palpitations and leg swelling.   Gastrointestinal: Negative for abdominal pain, blood in stool, constipation, diarrhea, nausea and vomiting.   Endocrine: Negative.    Genitourinary: Negative for vaginal discharge.   Musculoskeletal: Positive for arthralgias. Negative for joint swelling and myalgias.   Hematological: Negative for adenopathy. Does not bruise/bleed easily.       Objective   VITAL SIGNS:     Vitals:    09/17/18 1525   BP: 138/90   Pulse: 82   Resp: 16   Temp: 99 °F (37.2 °C)   SpO2: 96%  Comment: at rest   Weight: Comment: pt. unable to weigh has cast boot   Height:  Comment: unable to get new ht. has a cast boot   PainSc: 2  Comment: rt. ankle     There is no height or weight on file to calculate BMI.     Wt Readings from Last 3 Encounters:   07/09/18 87.5 kg (193 lb)   05/03/18 86.5 kg (190 lb 9.6 oz)   12/21/17 86.9 kg (191 lb 9.6 oz)       PHYSICAL EXAMINATION:  GENERAL: female comfortable, no acute distress  SKIN:  Warm, without rashes, purpura or petechiae.   EYES:  EOMs intact.  Conjunctivae normal. Pupils equal and reactive to light.   EARS:  Hearing intact.  LYMPHATICS:  No cervical, supraclavicular, axillary adenopathy.  RESP:  Lungs clear to percussion and auscultation. Good airflow. Normal effort.   CHEST:  Breast exam - Yes, describe:right breast with mild retraction laterally with firm scar tissue seemingly consistent with fat necrosis at the area of prior aspiration - unchanging.  CARDIAC:  Regular rate and rhythm without murmurs, rubs or gallops. Normal S1,S2. Lower extremity edema:  No; right leg in ortho boot  GI:  Soft, nontender, normal bowel sounds,  PSYCHIATRIC:  Normal affect and mood; alert and oriented x 3; Insight and judgement appropriate        DIAGNOSTIC DATA:   Results for orders placed or performed in visit on 09/17/18   CBC Auto Differential   Result Value Ref Range    WBC 5.26 4.00 - 10.00 10*3/mm3    RBC 4.08 3.90 - 5.00 10*6/mm3    Hemoglobin 12.6 11.5 - 14.9 g/dL    Hematocrit 37.5 34.0 - 45.0 %    MCV 91.9 83.0 - 97.0 fL    MCH 30.9 27.0 - 33.0 pg    MCHC 33.6 32.0 - 35.0 g/dL    RDW 11.9 11.7 - 14.5 %    RDW-SD 40.4 37.0 - 49.0 fl    MPV 9.2 8.9 - 12.1 fL    Platelets 278 150 - 375 10*3/mm3    Neutrophil % 49.2 39.0 - 75.0 %    Lymphocyte % 40.7 20.0 - 49.0 %    Monocyte % 6.8 4.0 - 12.0 %    Eosinophil % 2.5 1.0 - 5.0 %    Basophil % 0.6 0.0 - 1.1 %    Immature Grans % 0.2 0.0 - 0.5 %    Neutrophils, Absolute 2.59 1.50 - 7.00 10*3/mm3    Lymphocytes, Absolute 2.14 1.00 - 3.50 10*3/mm3    Monocytes, Absolute 0.36 0.25 - 0.80 10*3/mm3     Eosinophils, Absolute 0.13 0.00 - 0.36 10*3/mm3    Basophils, Absolute 0.03 0.00 - 0.10 10*3/mm3    Immature Grans, Absolute 0.01 0.00 - 0.03 10*3/mm3    nRBC 0.0 0.0 - 0.0 /100 WBC     Mammogram: 2/13/18 Mammogram:     IMPRESSION:  1. There is no evidence for malignancy or significant change in either  breast. Routine followup mammography is recommended.      Assessment/Plan   1. Stage [id number] right breast, upper outer quadrant, invasive ductal carcinoma, grade 2 with associated ductal carcinoma in situ. ER/IL 90%, Her2 negative   - status post lumpectomy and sentinel lymph node biopsy on 2/13/17.    - status post radiation therapy completed 4/11/17   - Arimidex started 4/2017. Tolerating well.    - Mammogram due 2/201 - ordered   - Patient was instructed on the importance of physical activity, healthy diet, and self breast exams.    - Remains no evidence of disease.    2. Osteopenia. DEXA 10/2016. On calcium and vitamin D. Repeat DEXA due 10/2018 - patient will have done at [facility].   3. Hot flashes from Arimidex. Improved on Effexor.    4. Arthralgias from Arimidex. Monitor for now.     NP follow up in 6 months then 1 year with Dr Tom.     Marlen Christine MD  09/17/18

## 2018-09-18 ENCOUNTER — TELEPHONE (OUTPATIENT)
Dept: SLEEP MEDICINE | Facility: HOSPITAL | Age: 63
End: 2018-09-18

## 2018-09-18 NOTE — TELEPHONE ENCOUNTER
Spoke with patient about sleepy study results, sending order to [facility], follow up scheduled

## 2018-10-17 RX ORDER — VENLAFAXINE HYDROCHLORIDE 75 MG/1
75 CAPSULE, EXTENDED RELEASE ORAL DAILY
Qty: 90 CAPSULE | Refills: 2 | Status: CANCELLED | OUTPATIENT
Start: 2018-10-17

## 2018-10-22 RX ORDER — VENLAFAXINE HYDROCHLORIDE 75 MG/1
75 CAPSULE, EXTENDED RELEASE ORAL DAILY
Qty: 90 CAPSULE | Refills: 2 | Status: CANCELLED | OUTPATIENT
Start: 2018-10-17

## 2018-10-22 RX ORDER — AMITRIPTYLINE HYDROCHLORIDE 10 MG/1
10 TABLET, FILM COATED ORAL DAILY
Qty: 90 TABLET | Refills: 2 | Status: CANCELLED | OUTPATIENT
Start: 2018-10-22

## 2018-11-26 ENCOUNTER — OFFICE VISIT (OUTPATIENT)
Dept: SLEEP MEDICINE | Facility: HOSPITAL | Age: 63
End: 2018-11-26
Attending: INTERNAL MEDICINE

## 2018-11-26 VITALS — WEIGHT: 200 LBS | HEART RATE: 67 BPM | BODY MASS INDEX: 31.39 KG/M2 | HEIGHT: 67 IN

## 2018-11-26 DIAGNOSIS — Z99.89 OSA ON CPAP: Primary | ICD-10-CM

## 2018-11-26 DIAGNOSIS — E66.9 OBESITY (BMI 30-39.9): ICD-10-CM

## 2018-11-26 DIAGNOSIS — G47.10 HYPERSOMNIA WITH SLEEP APNEA: ICD-10-CM

## 2018-11-26 DIAGNOSIS — G47.34 SLEEP RELATED HYPOXIA: ICD-10-CM

## 2018-11-26 DIAGNOSIS — G47.30 HYPERSOMNIA WITH SLEEP APNEA: ICD-10-CM

## 2018-11-26 DIAGNOSIS — G47.33 OSA ON CPAP: Primary | ICD-10-CM

## 2018-11-26 PROCEDURE — G0463 HOSPITAL OUTPT CLINIC VISIT: HCPCS

## 2018-11-26 NOTE — PROGRESS NOTES
Sleep Disorder Follow Up    Patient Name: Jaimie Jeffries  Age/Sex: 63 y.o. female  : 1955  MRN: [id number]    Date of Encounter Visit: 18    Referring Provider: Irineo Hill MD  Place of Service: [facility]  Patient Care Team:  Rj Crawford MD as PCP - General (Internal Medicine)  Rj Crawford MD as PCP - Internal Medicine  Fred Denny MD as Referring Physician (Breast Surgery)  Sangeeta Tom MD as Consulting Physician (Hematology and Oncology)  Jennifer Mcclendon MD as Consulting Physician (Obstetrics and Gynecology)  Andrei Cohen MD (Inactive) as Consulting Physician (Radiation Oncology)  Marlen Christine MD as Consulting Physician (Hematology and Oncology)    PROBLEM LIST:  1. Obstructive  sleep apnea   2. Obesity (BMI 30-39.9)   3. Palpitations   4. Snoring   5. Witnessed episode of apnea   6. PLMD (periodic limb movement disorder)  7. Sleep related hypoxemia       History of Present Illness:  Jaimie Jeffries is a 63 y.o. female who is here for follow up on DEEPALI.   Patient last seen in the office on 2018.  Since last visit, patient had a In lab split study that was done on 18 that was positive for severe sleep apnea, overall AHI was 23 but the RDI was 81.3.  There was severe sleep fragmentation with arousal index of 91.2 with sleep related hypoxemia.  Patient was titrated on CPAP and had a good control on a CPAP of 8 with residual AHI of 4.2.  Patient did have a surgery on her foot right after she got the CPAP which didn't affect the quality of the sleep at least initially.  Patient uses machine every night with no complaints from the mask or the pressure.  Patient uses a nasal pillows  which duval fit well. Patient denies any problem with air leak or dry mouth.   Patient's equipment supplier is [employer] and last mask replacement was less then 3 months ago.  Patient sleeps better and has a deeper sleep with the machine and feels  more energy during the day time.  Currently on CPAP at 8 cm H20.  [place] Sleepiness Scale (ESS) is down to 0.  Compliance download was reviewed and documented below.  Weight is up by 7 pounds since last visit.  Other comorbidities include palpitation, obesity, and sleep related hypoxemia in addition to the DEEPALI    Review of Systems:   A ten-system review was conducted and was negative .   Please refer to the follow up sleep questionnaire page one for details.    Past Medical History:  Past medical, surgical, social, and family history, except as mentioned above, was unchanged from the last visit.     Past Medical History:   Diagnosis Date   • Breast cancer (CMS/HCC)     Right   • Depression    • Hx of radiation therapy    • PONV (postoperative nausea and vomiting)     PREFER SCOPE PATCH,ZOFRAN,PHENERGAN       Past Surgical History:   Procedure Laterality Date   • BREAST BIOPSY Right 2012, 2014    benign   • BREAST BIOPSY Right 2017    malignant       Home Medications:     Current Outpatient Medications:   •  amitriptyline (ELAVIL) 10 MG tablet, Take 1 tablet by mouth Daily., Disp: 90 tablet, Rfl: 2  •  amitriptyline (ELAVIL) 10 MG tablet, Take 1 tablet by mouth daily, Disp: 90 tablet, Rfl: 0  •  anastrozole (ARIMIDEX) 1 MG tablet, Take 1 tablet by mouth Daily., Disp: 90 tablet, Rfl: 3  •  b complex-C-folic acid 1 MG capsule, Take 1 capsule by mouth Daily., Disp: , Rfl:   •  calcium carbonate-cholecalciferol 500-400 MG-UNIT tablet tablet, Take 1 tablet by mouth Daily., Disp: , Rfl:   •  hepatitis A (HAVRIX) 1440 EL U/ML vaccine, Inject  into the appropriate muscle as directed by prescriber., Disp: 1 mL, Rfl: 0  •  HYDROcodone-acetaminophen (NORCO) 5-325 MG per tablet, Take 1 tablet by mouth Every 8 (Eight) Hours As Needed for Severe Pain ., Disp: 12 tablet, Rfl: 0  •  lidocaine (XYLOCAINE) 2 % jelly, APPLY A PEA SIZED AMOUNT TO VULVA PRN  PATIENT HAS NEVER USED, Disp: , Rfl: 12  •  Lutein 10 MG tablet, Take  by  "mouth., Disp: , Rfl:   •  melatonin 5 MG tablet tablet, Take 5 mg by mouth Every Night., Disp: , Rfl:   •  Multiple Vitamins-Minerals (MULTIVITAL PO), Take  by mouth., Disp: , Rfl:   •  NON FORMULARY, 1 capsule Daily. NHT Nerium, Disp: , Rfl:   •  venlafaxine XR (EFFEXOR-XR) 75 MG 24 hr capsule, Take 1 capsule by mouth Daily., Disp: 90 capsule, Rfl: 2  •  venlafaxine XR (EFFEXOR-XR) 75 MG 24 hr capsule, Take 1 capsule by mouth Daily., Disp: 90 capsule, Rfl: 0    Allergies:  No Known Allergies    Past Social History:  Social History     Socioeconomic History   • Marital status:      Spouse name: Not on file   • Number of children: 4   • Years of education: College   • Highest education level: Not on file   Occupational History   • Occupation: RN     Employer: [employer]   Tobacco Use   • Smoking status: Never Smoker   • Smokeless tobacco: Never Used   Substance and Sexual Activity   • Alcohol use: Yes     Alcohol/week: 1.2 oz     Types: 2 Glasses of wine per week   • Drug use: No     Comment: Consumes one to 2 cups of caffeine per day usually coffee   • Sexual activity: Yes     Comment:    Social History Narrative    Accompanied by friend Ryan       Past Family History:  Family History   Problem Relation Age of Onset   • Breast cancer Mother 70   • Hearing loss Mother    • Stroke Mother    • Breast cancer Sister 50   • Hypertension Sister    • Hearing loss Father    • Hyperlipidemia Father    • Hypertension Father    • Kidney cancer Sister    • Hypertension Brother    • Stomach cancer Sister          Objective:   Done and documented on sleep disorders center physical examination sheet, please refer to hand written note on the chart for details about the other pertinent negative findings.    Vital Signs:   Visit Vitals  Pulse 67   Ht 168.9 cm (66.5\")   Wt 90.7 kg (200 lb)   BMI 31.80 kg/m²     Wt Readings from Last 3 Encounters:   11/26/18 90.7 kg (200 lb)   07/09/18 87.5 kg (193 lb) "   05/03/18 86.5 kg (190 lb 9.6 oz)          Physical Exam:   General: AAOx3. Normal mood and affect.   HEENT:  Conjunctiva normal.  PERRLA.  Moist mucous membranes.  Septum midline. Mallampati 4 airway. Edematous uvula.   Neck: Neck supple. Trachea midline.  Normal thyroid.   LUNGS: Non-labored breathing. CTAB.  No wheezing.  HEART: regular rate and rhythm. No murmur. Normal s1/s2.  EXTREMITIES: no edema. No cyanosis or clubbing. Normal gait.      Diagnostic Data:  This was a split night study done on 8/20/18  Initial diagnostic half was 2.62 hours in bed was 1.81 hours of sleep with only stage I and stage II sleep noted, the respiratory summary was still positive for severe sleep apnea with RDI of 81.3, the AHI was 23.2.  Patient had positional component with AHI of 34.8 in supine sleep with an RDI of 103.2.  Patient had no significant hypoxemia with evidence of severe sleep fragmentation with arousal index of 91.2 with majority of the arousals being snoring or respiratory events related.  There was no pleuritic leg movement noted.  Patient had severe snoring at 61.77% of total sleep time     During the second half patient was started on positive pressure titration with CPAP of 5 that was started although it up to 8.  On a pressure of 8 we had 230.5 minutes including 29 minutes of REM sleep with good control of the apnea ischemia with AHI of 4.2 and with good sleep efficiency and good tolerance.  The sleep fragmentation did resolve on the CPAP and the patient had good improvement in sleep architecture as well.    Impression:   Severe DEEPALI with RDI 81.3, AHI 23.2 with supine positional component  Insignificant hypoxemia  Good control on a CPAP of 8  Plan:   Start CPAP at 8 cm of water       Compliance download from 10/27/2018-11/25/2018 showed 100%compliance with an average nightly use of 7 hours and 11 minutes with residual AHI of 5.8 on CPAP of 8      Assessment and Plan:       ICD-10-CM ICD-9-CM   1. DEEPALI on CPAP  G47.33 327.23    Z99.89 V46.8   2. Sleep related hypoxia G47.34 327.24   3. Hypersomnia with sleep apnea G47.10 780.53    G47.30    4. Obesity (BMI 30-39.9) E66.9 278.00       Recommendations:     Patient is compliant with the CPAP with both clinical and subjective benefit from the machine and it is recommended to be continued  She did have a foot surgery and had some several other factors affecting the quality of the sleep so may not have felt immediately better on it but now after she's been on it for a while she does feel a better deeper sleep with more energy during the daytime.  Given the severity of the sleep apnea and the associated hypoxemia the CPAP is strongly recommended from the medical standpoint as well since it did correct both underlying conditions.  For more optimal control we will increase the pressure to 10 cm, patient also requested to reduce the ramp time down to 10 minutes which was done today as well.  Education was provided about the ramp options and how she can modify that if need to.  Patient is to continue working on the weight loss  Patient is to follow-up with me on a yearly basis or sooner if needed    No orders of the defined types were placed in this encounter.    No orders of the defined types were placed in this encounter.    Return in about 1 year (around 11/26/2019).    Irineo Hill MD   [facility]   11/26/18  5:15 PM    Dictated utilizing Dragon dictation

## 2019-02-18 ENCOUNTER — HOSPITAL ENCOUNTER (OUTPATIENT)
Dept: MAMMOGRAPHY | Facility: HOSPITAL | Age: 64
Discharge: HOME | End: 2019-02-18
Admitting: INTERNAL MEDICINE

## 2019-02-18 DIAGNOSIS — Z12.31 ENCOUNTER FOR SCREENING MAMMOGRAM FOR BREAST CANCER: ICD-10-CM

## 2019-02-18 DIAGNOSIS — Z17.0 MALIGNANT NEOPLASM OF OVERLAPPING SITES OF RIGHT BREAST IN FEMALE, ESTROGEN RECEPTOR POSITIVE (HCC): ICD-10-CM

## 2019-02-18 DIAGNOSIS — C50.811 MALIGNANT NEOPLASM OF OVERLAPPING SITES OF RIGHT BREAST IN FEMALE, ESTROGEN RECEPTOR POSITIVE (HCC): ICD-10-CM

## 2019-02-18 PROCEDURE — 77063 BREAST TOMOSYNTHESIS BI: CPT

## 2019-02-18 PROCEDURE — 77067 SCR MAMMO BI INCL CAD: CPT

## 2019-03-07 ENCOUNTER — APPOINTMENT (OUTPATIENT)
Dept: ONCOLOGY | Facility: CLINIC | Age: 64
End: 2019-03-07

## 2019-03-07 ENCOUNTER — APPOINTMENT (OUTPATIENT)
Dept: LAB | Facility: HOSPITAL | Age: 64
End: 2019-03-07

## 2019-03-12 ENCOUNTER — APPOINTMENT (OUTPATIENT)
Dept: LAB | Facility: HOSPITAL | Age: 64
End: 2019-03-12

## 2019-03-12 ENCOUNTER — OFFICE VISIT (OUTPATIENT)
Dept: ONCOLOGY | Facility: CLINIC | Age: 64
End: 2019-03-12

## 2019-03-12 VITALS
HEART RATE: 80 BPM | TEMPERATURE: 98.3 F | RESPIRATION RATE: 16 BRPM | HEIGHT: 67 IN | WEIGHT: 201 LBS | DIASTOLIC BLOOD PRESSURE: 82 MMHG | OXYGEN SATURATION: 96 % | SYSTOLIC BLOOD PRESSURE: 121 MMHG | BODY MASS INDEX: 31.55 KG/M2

## 2019-03-12 DIAGNOSIS — Z17.0 MALIGNANT NEOPLASM OF OVERLAPPING SITES OF RIGHT BREAST IN FEMALE, ESTROGEN RECEPTOR POSITIVE (HCC): Primary | ICD-10-CM

## 2019-03-12 DIAGNOSIS — C50.811 MALIGNANT NEOPLASM OF OVERLAPPING SITES OF RIGHT BREAST IN FEMALE, ESTROGEN RECEPTOR POSITIVE (HCC): Primary | ICD-10-CM

## 2019-03-12 DIAGNOSIS — R05.9 COUGH: ICD-10-CM

## 2019-03-12 PROCEDURE — G0463 HOSPITAL OUTPT CLINIC VISIT: HCPCS | Performed by: NURSE PRACTITIONER

## 2019-03-12 PROCEDURE — 99214 OFFICE O/P EST MOD 30 MIN: CPT | Performed by: NURSE PRACTITIONER

## 2019-03-12 NOTE — PROGRESS NOTES
Subjective     CHIEF COMPLAINT:    1. Stage [id number] right breast, upper outer quadrant, invasive ductal carcinoma, grade 2 with associated ductal carcinoma in situ. ER/KS 90%, Her2 negative    HISTORY OF PRESENT ILLNESS:     Jaimie Jeffries is a 63 y.o. female with the above-mentioned history here today for follow-up visit.  She continues on Arimidex.  She feels like she is tolerating it well, especially since she is taking Effexor.  She does have occasional hot flashes but they are tolerable.  She also reports some arthralgias, particularly in her knees and ankles but again feels that they are tolerable.  Patient is concerned because she has had a cough for over a month now.  She states it is nonproductive.  She has not had fevers or chills.  She denies any chest pain.,     Reviewed, confirmed and updated history (past medical, social and family)   Past Medical History:   Diagnosis Date   • Breast cancer (CMS/HCC)     Right   • Depression    • Hx of radiation therapy    • PONV (postoperative nausea and vomiting)     PREFER SCOPE PATCH,ZOFRAN,PHENERGAN       Past Surgical History:   Procedure Laterality Date   • ANKLE OPEN REDUCTION INTERNAL FIXATION Right 8/27/2018    Procedure: OPEN REDUCTION INTERNAL FIXATION DISLOCATED RT ANKLE FRACTURE;  Surgeon: Cristian Qureshi MD;  [place] [facility] [place] [facility] [place]  Service: Orthopedics   • BREAST BIOPSY Right 2012, 2014    benign   • BREAST BIOPSY Right 2017    malignant   • BREAST LUMPECTOMY     • BREAST LUMPECTOMY WITH SENTINEL NODE BIOPSY Right 2/13/2017    Procedure: BREAST LUMPECTOMY WITH SENTINEL NODE BIOPSY AND NEEDLE LOCALIZATION;  Surgeon: Fred Denny MD;  Location: [facility] [place] [facility] [place];  Service:        Cancer-related family history includes Breast cancer (age of onset: 50) in her sister; Breast cancer (age of onset: 70) in her mother; Kidney cancer in her sister; Stomach cancer in her sister.  Social History     Tobacco Use   • Smoking status: Never Smoker   •  Smokeless tobacco: Never Used   Substance Use Topics   • Alcohol use: Yes     Alcohol/week: 1.2 oz     Types: 2 Glasses of wine per week       MEDICATIONS:    Current Outpatient Medications:   •  amitriptyline (ELAVIL) 10 MG tablet, Take 1 tablet by mouth Daily., Disp: 90 tablet, Rfl: 2  •  anastrozole (ARIMIDEX) 1 MG tablet, Take 1 tablet by mouth Daily., Disp: 90 tablet, Rfl: 3  •  b complex-C-folic acid 1 MG capsule, Take 1 capsule by mouth Daily., Disp: , Rfl:   •  calcium carbonate-cholecalciferol 500-400 MG-UNIT tablet tablet, Take 1 tablet by mouth Daily., Disp: , Rfl:   •  hepatitis A (HAVRIX) 1440 EL U/ML vaccine, Inject  into the appropriate muscle as directed by prescriber., Disp: 1 mL, Rfl: 0  •  Lutein 10 MG tablet, Take  by mouth., Disp: , Rfl:   •  melatonin 5 MG tablet tablet, Take 5 mg by mouth Every Night., Disp: , Rfl:   •  Multiple Vitamins-Minerals (MULTIVITAL PO), Take  by mouth., Disp: , Rfl:   •  NON FORMULARY, 1 capsule Daily. NHT Nerium, Disp: , Rfl:   •  venlafaxine XR (EFFEXOR-XR) 75 MG 24 hr capsule, Take 1 capsule by mouth Daily., Disp: 90 capsule, Rfl: 2  •  amitriptyline (ELAVIL) 10 MG tablet, Take 1 tablet by mouth daily, Disp: 90 tablet, Rfl: 2  •  cefdinir (OMNICEF) 300 MG capsule, Take 1 capsule by mouth 2 (Two) Times a Day., Disp: 20 capsule, Rfl: 0  •  HYDROcodone-acetaminophen (NORCO) 5-325 MG per tablet, Take 1 tablet by mouth Every 8 (Eight) Hours As Needed for Severe Pain ., Disp: 12 tablet, Rfl: 0  •  lidocaine (XYLOCAINE) 2 % jelly, APPLY A PEA SIZED AMOUNT TO VULVA PRN  PATIENT HAS NEVER USED, Disp: , Rfl: 12  •  meloxicam (MOBIC) 15 MG tablet, Take one tablet by mouth daily., Disp: 30 tablet, Rfl: 0  •  venlafaxine XR (EFFEXOR-XR) 75 MG 24 hr capsule, Take 1 capsule by mouth Daily., Disp: 90 capsule, Rfl: 2    ALLERGIES:  No Known Allergies    REVIEW OF SYSTEMS:  Review of Systems   Constitutional: Negative for activity change, appetite change, chills, diaphoresis,  "fatigue, fever and unexpected weight change.   Respiratory: Positive for cough. Negative for chest tightness and shortness of breath.    Cardiovascular: Negative for chest pain, palpitations and leg swelling.   Gastrointestinal: Negative for abdominal pain, blood in stool, constipation, diarrhea, nausea and vomiting.   Genitourinary: Negative for vaginal discharge.   Musculoskeletal: Positive for arthralgias (ankles and knees). Negative for joint swelling and myalgias.   Hematological: Negative for adenopathy. Does not bruise/bleed easily.       Objective   VITAL SIGNS:     Vitals:    03/12/19 1532   BP: 121/82   Pulse: 80   Resp: 16   Temp: 98.3 °F (36.8 °C)   SpO2: 96%   Weight: 91.2 kg (201 lb)   Height: 170 cm (66.93\")  Comment: new ht w/shoes   PainSc: 0-No pain     Body mass index is 31.55 kg/m².     Wt Readings from Last 3 Encounters:   03/12/19 91.2 kg (201 lb)   02/16/19 90.7 kg (200 lb)   11/26/18 90.7 kg (200 lb)       Physical Exam   Constitutional: She is oriented to person, place, and time. She appears well-developed and well-nourished. No distress.   HENT:   Head: Normocephalic and atraumatic.   Mouth/Throat: Oropharynx is clear and moist and mucous membranes are normal. No oropharyngeal exudate.   Eyes: Pupils are equal, round, and reactive to light.   Neck: Normal range of motion.   Cardiovascular: Normal rate, regular rhythm and normal heart sounds.   No murmur heard.  Pulmonary/Chest: Effort normal and breath sounds normal. No respiratory distress. She has no wheezes. She has no rhonchi. She has no rales.   BREASTS: Right upper outer quadrant well-healed lumpectomy incision.  An area of firm scar tissue around 9:00 measuring about 2 cm, tender.  Unchanged.   Abdominal: Soft. Normal appearance and bowel sounds are normal. She exhibits no distension. There is no hepatosplenomegaly.   Musculoskeletal: Normal range of motion. She exhibits no edema.   Neurological: She is alert and oriented to person, " place, and time.   Skin: Skin is warm and dry. No rash noted.   Psychiatric: She has a normal mood and affect.   Vitals reviewed.      DIAGNOSTIC DATA:   Results for orders placed or performed in visit on 09/17/18   CBC Auto Differential   Result Value Ref Range    WBC 5.26 4.00 - 10.00 10*3/mm3    RBC 4.08 3.90 - 5.00 10*6/mm3    Hemoglobin 12.6 11.5 - 14.9 g/dL    Hematocrit 37.5 34.0 - 45.0 %    MCV 91.9 83.0 - 97.0 fL    MCH 30.9 27.0 - 33.0 pg    MCHC 33.6 32.0 - 35.0 g/dL    RDW 11.9 11.7 - 14.5 %    RDW-SD 40.4 37.0 - 49.0 fl    MPV 9.2 8.9 - 12.1 fL    Platelets 278 150 - 375 10*3/mm3    Neutrophil % 49.2 39.0 - 75.0 %    Lymphocyte % 40.7 20.0 - 49.0 %    Monocyte % 6.8 4.0 - 12.0 %    Eosinophil % 2.5 1.0 - 5.0 %    Basophil % 0.6 0.0 - 1.1 %    Immature Grans % 0.2 0.0 - 0.5 %    Neutrophils, Absolute 2.59 1.50 - 7.00 10*3/mm3    Lymphocytes, Absolute 2.14 1.00 - 3.50 10*3/mm3    Monocytes, Absolute 0.36 0.25 - 0.80 10*3/mm3    Eosinophils, Absolute 0.13 0.00 - 0.36 10*3/mm3    Basophils, Absolute 0.03 0.00 - 0.10 10*3/mm3    Immature Grans, Absolute 0.01 0.00 - 0.03 10*3/mm3    nRBC 0.0 0.0 - 0.0 /100 WBC     Mammogram: 2/18/19 Mammogram:   IMPRESSION:  1. There is no evidence for malignancy or significant change in either  breast. Routine followup mammography is recommended.       Assessment/Plan   1. Stage [id number] right breast, upper outer quadrant, invasive ductal carcinoma, grade 2 with associated ductal carcinoma in situ. ER/CO 90%, Her2 negative   - status post lumpectomy and sentinel lymph node biopsy on 2/13/17.    - status post radiation therapy completed 4/11/17   - Arimidex started 4/2017.  Tolerating well with only some mild hot flashes and arthralgias.  .    - Mammogram done 2/18/2019.     - Patient was instructed on the importance of physical activity, healthy diet, and self breast exams.    - Remains no evidence of disease.    2. Osteopenia. DEXA 10/2018. On calcium and vitamin D - patient  will have done at [facility].   3. Hot flashes from Arimidex. Improved on Effexor.    4. Arthralgias from Arimidex. Monitor for now.   5.  Cough: afebrile. We will send her for a chest x-ray for further evaluation.    Follow-up in 6 months with Dr. Leyva with a repeat CBC and CMP at that time.    Janki Garibay, APRN  03/13/19

## 2019-03-19 ENCOUNTER — HOSPITAL ENCOUNTER (OUTPATIENT)
Dept: GENERAL RADIOLOGY | Facility: HOSPITAL | Age: 64
Discharge: HOME | End: 2019-03-19
Admitting: NURSE PRACTITIONER

## 2019-03-19 PROCEDURE — 71046 X-RAY EXAM CHEST 2 VIEWS: CPT

## 2019-04-18 RX ORDER — ANASTROZOLE 1 MG/1
1 TABLET ORAL DAILY
Qty: 90 TABLET | Refills: 3 | Status: SHIPPED | OUTPATIENT
Start: 2019-04-18 | End: 2020-03-19

## 2019-08-29 ENCOUNTER — LAB (OUTPATIENT)
Dept: LAB | Facility: HOSPITAL | Age: 64
End: 2019-08-29

## 2019-08-29 ENCOUNTER — TRANSCRIBE ORDERS (OUTPATIENT)
Dept: ADMINISTRATIVE | Facility: HOSPITAL | Age: 64
End: 2019-08-29

## 2019-08-29 ENCOUNTER — OFFICE VISIT (OUTPATIENT)
Dept: ONCOLOGY | Facility: CLINIC | Age: 64
End: 2019-08-29

## 2019-08-29 VITALS
TEMPERATURE: 99.3 F | SYSTOLIC BLOOD PRESSURE: 125 MMHG | DIASTOLIC BLOOD PRESSURE: 76 MMHG | HEIGHT: 67 IN | BODY MASS INDEX: 27.47 KG/M2 | RESPIRATION RATE: 16 BRPM | HEART RATE: 78 BPM | WEIGHT: 175 LBS | OXYGEN SATURATION: 96 %

## 2019-08-29 DIAGNOSIS — C50.811 MALIGNANT NEOPLASM OF OVERLAPPING SITES OF RIGHT BREAST IN FEMALE, ESTROGEN RECEPTOR POSITIVE (HCC): Primary | ICD-10-CM

## 2019-08-29 DIAGNOSIS — T45.1X5A AROMATASE INHIBITOR-ASSOCIATED ARTHRALGIA: ICD-10-CM

## 2019-08-29 DIAGNOSIS — Z12.31 ENCOUNTER FOR SCREENING MAMMOGRAM FOR BREAST CANCER: ICD-10-CM

## 2019-08-29 DIAGNOSIS — Z12.39 SCREENING BREAST EXAMINATION: Primary | ICD-10-CM

## 2019-08-29 DIAGNOSIS — Z17.0 MALIGNANT NEOPLASM OF OVERLAPPING SITES OF RIGHT BREAST IN FEMALE, ESTROGEN RECEPTOR POSITIVE (HCC): Primary | ICD-10-CM

## 2019-08-29 DIAGNOSIS — M25.50 AROMATASE INHIBITOR-ASSOCIATED ARTHRALGIA: ICD-10-CM

## 2019-08-29 DIAGNOSIS — Z17.0 MALIGNANT NEOPLASM OF OVERLAPPING SITES OF RIGHT BREAST IN FEMALE, ESTROGEN RECEPTOR POSITIVE (HCC): ICD-10-CM

## 2019-08-29 DIAGNOSIS — C50.811 MALIGNANT NEOPLASM OF OVERLAPPING SITES OF RIGHT BREAST IN FEMALE, ESTROGEN RECEPTOR POSITIVE (HCC): ICD-10-CM

## 2019-08-29 DIAGNOSIS — M85.80 OSTEOPENIA, UNSPECIFIED LOCATION: ICD-10-CM

## 2019-08-29 LAB
ALBUMIN SERPL-MCNC: 4.8 G/DL (ref 3.5–5.2)
ALBUMIN/GLOB SERPL: 1.7 G/DL (ref 1.1–2.4)
ALP SERPL-CCNC: 104 U/L (ref 38–116)
ALT SERPL W P-5'-P-CCNC: 21 U/L (ref 0–33)
ANION GAP SERPL CALCULATED.3IONS-SCNC: 12.4 MMOL/L (ref 5–15)
AST SERPL-CCNC: 21 U/L (ref 0–32)
BASOPHILS # BLD AUTO: 0.03 10*3/MM3 (ref 0–0.2)
BASOPHILS NFR BLD AUTO: 0.5 % (ref 0–1.5)
BILIRUB SERPL-MCNC: 0.2 MG/DL (ref 0.2–1.2)
BUN BLD-MCNC: 28 MG/DL (ref 6–20)
BUN/CREAT SERPL: 37.3 (ref 7.3–30)
CALCIUM SPEC-SCNC: 9.6 MG/DL (ref 8.5–10.2)
CHLORIDE SERPL-SCNC: 97 MMOL/L (ref 98–107)
CO2 SERPL-SCNC: 27.6 MMOL/L (ref 22–29)
CREAT BLD-MCNC: 0.75 MG/DL (ref 0.6–1.1)
DEPRECATED RDW RBC AUTO: 46.4 FL (ref 37–54)
EOSINOPHIL # BLD AUTO: 0.06 10*3/MM3 (ref 0–0.4)
EOSINOPHIL NFR BLD AUTO: 0.9 % (ref 0.3–6.2)
ERYTHROCYTE [DISTWIDTH] IN BLOOD BY AUTOMATED COUNT: 13.2 % (ref 12.3–15.4)
GFR SERPL CREATININE-BSD FRML MDRD: 78 ML/MIN/1.73
GLOBULIN UR ELPH-MCNC: 2.8 GM/DL (ref 1.8–3.5)
GLUCOSE BLD-MCNC: 101 MG/DL (ref 74–124)
HCT VFR BLD AUTO: 38.7 % (ref 34–46.6)
HGB BLD-MCNC: 12.6 G/DL (ref 12–15.9)
IMM GRANULOCYTES # BLD AUTO: 0.01 10*3/MM3 (ref 0–0.05)
IMM GRANULOCYTES NFR BLD AUTO: 0.2 % (ref 0–0.5)
LYMPHOCYTES # BLD AUTO: 1.91 10*3/MM3 (ref 0.7–3.1)
LYMPHOCYTES NFR BLD AUTO: 28.9 % (ref 19.6–45.3)
MCH RBC QN AUTO: 31.3 PG (ref 26.6–33)
MCHC RBC AUTO-ENTMCNC: 32.6 G/DL (ref 31.5–35.7)
MCV RBC AUTO: 96 FL (ref 79–97)
MONOCYTES # BLD AUTO: 0.39 10*3/MM3 (ref 0.1–0.9)
MONOCYTES NFR BLD AUTO: 5.9 % (ref 5–12)
NEUTROPHILS # BLD AUTO: 4.22 10*3/MM3 (ref 1.7–7)
NEUTROPHILS NFR BLD AUTO: 63.6 % (ref 42.7–76)
NRBC BLD AUTO-RTO: 0 /100 WBC (ref 0–0.2)
PLATELET # BLD AUTO: 274 10*3/MM3 (ref 140–450)
PMV BLD AUTO: 8.1 FL (ref 6–12)
POTASSIUM BLD-SCNC: 4.4 MMOL/L (ref 3.5–4.7)
PROT SERPL-MCNC: 7.6 G/DL (ref 6.3–8)
RBC # BLD AUTO: 4.03 10*6/MM3 (ref 3.77–5.28)
SODIUM BLD-SCNC: 137 MMOL/L (ref 134–145)
WBC NRBC COR # BLD: 6.62 10*3/MM3 (ref 3.4–10.8)

## 2019-08-29 PROCEDURE — G0463 HOSPITAL OUTPT CLINIC VISIT: HCPCS | Performed by: INTERNAL MEDICINE

## 2019-08-29 PROCEDURE — 36415 COLL VENOUS BLD VENIPUNCTURE: CPT | Performed by: INTERNAL MEDICINE

## 2019-08-29 PROCEDURE — 99214 OFFICE O/P EST MOD 30 MIN: CPT | Performed by: INTERNAL MEDICINE

## 2019-08-29 PROCEDURE — 85025 COMPLETE CBC W/AUTO DIFF WBC: CPT | Performed by: INTERNAL MEDICINE

## 2019-08-29 PROCEDURE — 80053 COMPREHEN METABOLIC PANEL: CPT | Performed by: INTERNAL MEDICINE

## 2019-08-29 NOTE — PROGRESS NOTES
Subjective     CHIEF COMPLAINT:      Chief Complaint   Patient presents with   • Follow-up     no concerns       HISTORY OF PRESENT ILLNESS:     Jaimie Jeffries is a 63 y.o. female patient who returns today for follow up on her right breast cancer.  She is on Arimidex 1 mg daily.  She continues to tolerate it.  She reports feeling hot as a result of the medicine but the symptoms are improving.  She was having arthralgias in her knees which improved after she intentionally lost weight.    Patient reports occasional discomfort in the right breast.  She is performing breast self-exam but not on a monthly basis.    REVIEW OF SYSTEMS:  Review of Systems   Constitutional: Negative for chills, fever and unexpected weight change.   HENT: Negative for mouth sores, nosebleeds, sore throat and voice change.    Eyes: Negative for visual disturbance.   Respiratory: Negative for cough and shortness of breath.    Cardiovascular: Negative for chest pain and leg swelling.   Gastrointestinal: Negative for abdominal pain, blood in stool, constipation, diarrhea, nausea and vomiting.   Genitourinary: Negative for dysuria, frequency and hematuria.   Musculoskeletal: Negative for arthralgias, back pain and joint swelling.   Skin: Negative for rash.   Neurological: Negative for dizziness, numbness and headaches.   Hematological: Negative for adenopathy. Does not bruise/bleed easily.   Psychiatric/Behavioral: Negative for dysphoric mood. The patient is not nervous/anxious.      I verified the ROS obtained by the MA.      Past Medical History:   Diagnosis Date   • Breast cancer (CMS/HCC)     Right   • Depression    • Hx of radiation therapy    • PONV (postoperative nausea and vomiting)     PREFER SCOPE PATCH,ZOFRAN,PHENERGAN       Past Surgical History:   Procedure Laterality Date   • ANKLE OPEN REDUCTION INTERNAL FIXATION Right 8/27/2018    Procedure: OPEN REDUCTION INTERNAL FIXATION DISLOCATED RT ANKLE FRACTURE;  Surgeon: Cristian Qureshi,  MD;  Location: [facility];  Service: Orthopedics   • BREAST BIOPSY Right 2012, 2014    benign   • BREAST BIOPSY Right 2017    malignant   • BREAST LUMPECTOMY     • BREAST LUMPECTOMY WITH SENTINEL NODE BIOPSY Right 2/13/2017    Procedure: BREAST LUMPECTOMY WITH SENTINEL NODE BIOPSY AND NEEDLE LOCALIZATION;  Surgeon: Fred Denny MD;  Location: [facility];  Service:        Cancer-related family history includes Breast cancer (age of onset: 50) in her sister; Breast cancer (age of onset: 70) in her mother; Kidney cancer in her sister; Stomach cancer in her sister.  Social History     Tobacco Use   • Smoking status: Never Smoker   • Smokeless tobacco: Never Used   Substance Use Topics   • Alcohol use: Yes     Alcohol/week: 1.2 oz     Types: 2 Glasses of wine per week     Comment: 1-2 glasses of wine a week       MEDICATIONS:    Current Outpatient Medications:   •  amitriptyline (ELAVIL) 10 MG tablet, Take 1 tablet by mouth Daily., Disp: 90 tablet, Rfl: 2  •  anastrozole (ARIMIDEX) 1 MG tablet, Take 1 tablet by mouth Daily., Disp: 90 tablet, Rfl: 3  •  b complex-C-folic acid 1 MG capsule, Take 1 capsule by mouth Daily., Disp: , Rfl:   •  calcium carbonate-cholecalciferol 500-400 MG-UNIT tablet tablet, Take 1 tablet by mouth Daily., Disp: , Rfl:   •  hepatitis A (HAVRIX) 1440 EL U/ML vaccine, Inject  into the appropriate muscle as directed by prescriber., Disp: 1 mL, Rfl: 0  •  HYDROcodone-acetaminophen (NORCO) 5-325 MG per tablet, Take 1 tablet by mouth Every 8 (Eight) Hours As Needed for Severe Pain ., Disp: 12 tablet, Rfl: 0  •  melatonin 5 MG tablet tablet, Take 5 mg by mouth Every Night., Disp: , Rfl:   •  Multiple Vitamins-Minerals (MULTIVITAL PO), Take  by mouth., Disp: , Rfl:   •  NON FORMULARY, 1 capsule Daily. NHT Nerium, Disp: , Rfl:   •  venlafaxine XR (EFFEXOR-XR) 75 MG 24 hr capsule, Take 1 capsule by mouth Daily., Disp: 90 capsule, Rfl: 2  •  amitriptyline (ELAVIL) 10 MG tablet, Take 1 tablet  "by mouth daily, Disp: 90 tablet, Rfl: 2  •  cefdinir (OMNICEF) 300 MG capsule, Take 1 capsule by mouth 2 (Two) Times a Day., Disp: 20 capsule, Rfl: 0  •  meloxicam (MOBIC) 15 MG tablet, Take one tablet by mouth daily., Disp: 30 tablet, Rfl: 0  •  venlafaxine XR (EFFEXOR-XR) 75 MG 24 hr capsule, Take 1 capsule by mouth Daily., Disp: 90 capsule, Rfl: 2    ALLERGIES:  No Known Allergies      Objective   VITAL SIGNS:     Vitals:    08/29/19 1600   BP: 125/76   Pulse: 78   Resp: 16   Temp: 99.3 °F (37.4 °C)   TempSrc: Oral   SpO2: 96%   Weight: 79.4 kg (175 lb)   Height: 170 cm (66.93\")   PainSc: 0-No pain     Body mass index is 27.47 kg/m².     Wt Readings from Last 3 Encounters:   08/29/19 79.4 kg (175 lb)   03/12/19 91.2 kg (201 lb)   02/16/19 90.7 kg (200 lb)       PHYSICAL EXAMINATION:  GENERAL:  The patient appears in good general condition, not in acute distress.  SKIN: Warm and dry. No skin rashes. No ecchymosis.  HEAD:  Normocephalic.  EYES:  No Jaundice. No Pallor.   NECK:  Supple with Good ROM. No Thyromegaly. No Masses.  LYMPHATICS:  No cervical or supraclavicular or axillary lymphadenopathy.  BREASTS: Right breast exam revealed incision at 9-12 o'clock position with underlying scar tissue and dense breast tissue but is slightly tender.  No suspicious masses.  Left breast exam was unremarkable.  CHEST: Normal respiratory effort. Lungs clear to auscultation.   CARDIAC:  No edema.  ABDOMEN:  Soft. No tenderness. No Hepatomegaly. No Splenomegaly. No masses.  NEUROLOGICAL:  No Focal neurological deficits.         DIAGNOSTIC DATA:     Results from last 7 days   Lab Units 08/29/19  1549   WBC 10*3/mm3 6.62   NEUTROS ABS 10*3/mm3 4.22   HEMOGLOBIN g/dL 12.6   HEMATOCRIT % 38.7   PLATELETS 10*3/mm3 274     Results from last 7 days   Lab Units 08/29/19  1549   SODIUM mmol/L 137   POTASSIUM mmol/L 4.4   CHLORIDE mmol/L 97*   CO2 mmol/L 27.6   BUN mg/dL 28*   CREATININE mg/dL 0.75   CALCIUM mg/dL 9.6   ALBUMIN g/dL 4.80 "   BILIRUBIN mg/dL 0.2   ALK PHOS U/L 104   ALT (SGPT) U/L 21   AST (SGOT) U/L 21   GLUCOSE mg/dL 101       Assessment/Plan   1.  Right breast invasive ductal carcinoma, grade 2 with associated ductal carcinoma in situ.  · Status post lumpectomy and sentinel lymph node biopsy on 2/13/2017  · Maximum dimension was 4 mm.  · Stage IA (T1a N0 M0).  · ER and MD positive at 90%.  HER-2 negative.  · Adjuvant chemotherapy was not indicated.  · Post lumpectomy radiation therapy was completed on 4/11/2017.  · Adjuvant Arimidex was started on 4/24/2017.  · Last mammogram was on 2/18/2019 and was benign.    Exam from today revealed no clinical evidence of recurrence.  She is tolerating Arimidex with improvement in the symptoms.  She is almost half the way through her 5-year course of adjuvant therapy.    2.  Osteopenia.she is on calcium and vitamin D.  Last DEXA scan was on 10/20/2018.      3.  Hot flashes secondary to Arimidex.  The improved with Effexor XR 75 mg a day.    4.  Arthralgias secondary to Arimidex.  They improved after the patient had a 30 pound weight loss.     PLAN:    1.  Continue Arimidex for total of 5 years which will be completed in April 2022.  2.  We will schedule bilateral mammograms in mid February 2020.  She will see the nurse petitioner 1 week after with a CBC CMP.  She will see the physician in 1 year with a CBC CMP.  3.  Next bone density would be due in October 2020.  She usually has it  done at her gynecologist's office.      Sangeeta Tom MD  08/29/19

## 2019-11-25 ENCOUNTER — OFFICE VISIT (OUTPATIENT)
Dept: SLEEP MEDICINE | Facility: HOSPITAL | Age: 64
End: 2019-11-25
Attending: INTERNAL MEDICINE

## 2019-11-25 VITALS
HEART RATE: 67 BPM | DIASTOLIC BLOOD PRESSURE: 84 MMHG | SYSTOLIC BLOOD PRESSURE: 122 MMHG | WEIGHT: 173.2 LBS | HEIGHT: 67 IN | OXYGEN SATURATION: 96 % | BODY MASS INDEX: 27.18 KG/M2

## 2019-11-25 DIAGNOSIS — G47.34 SLEEP RELATED HYPOXIA: ICD-10-CM

## 2019-11-25 DIAGNOSIS — G47.30 HYPERSOMNIA WITH SLEEP APNEA: ICD-10-CM

## 2019-11-25 DIAGNOSIS — G47.10 HYPERSOMNIA WITH SLEEP APNEA: ICD-10-CM

## 2019-11-25 DIAGNOSIS — Z99.89 OSA ON CPAP: Primary | ICD-10-CM

## 2019-11-25 DIAGNOSIS — G47.33 OSA ON CPAP: Primary | ICD-10-CM

## 2019-11-25 DIAGNOSIS — E66.3 OVERWEIGHT (BMI 25.0-29.9): ICD-10-CM

## 2019-11-25 PROBLEM — M85.80 OSTEOPENIA: Status: RESOLVED | Noted: 2017-04-24 | Resolved: 2019-11-25

## 2019-11-25 PROBLEM — Z17.0 MALIGNANT NEOPLASM OF OVERLAPPING SITES OF RIGHT BREAST IN FEMALE, ESTROGEN RECEPTOR POSITIVE: Status: RESOLVED | Noted: 2017-01-11 | Resolved: 2019-11-25

## 2019-11-25 PROBLEM — C50.811 MALIGNANT NEOPLASM OF OVERLAPPING SITES OF RIGHT BREAST IN FEMALE, ESTROGEN RECEPTOR POSITIVE (HCC): Status: RESOLVED | Noted: 2017-01-11 | Resolved: 2019-11-25

## 2019-11-25 PROBLEM — E66.9 OBESITY (BMI 30-39.9): Status: RESOLVED | Noted: 2018-07-09 | Resolved: 2019-11-25

## 2019-11-25 PROCEDURE — G0463 HOSPITAL OUTPT CLINIC VISIT: HCPCS

## 2019-11-25 NOTE — PROGRESS NOTES
Sleep Disorder Follow Up    Patient Name: Jaimie Jeffries  Age/Sex: 64 y.o. female  : 1955  MRN: [id number]    Date of Encounter Visit: 19    Referring Provider: Irineo Hill MD  Place of Service: [facility]  Patient Care Team:  Rj Crawford MD as PCP - General (Internal Medicine)  Rj Crawford MD as PCP - Internal Medicine  Fred Denny MD as Referring Physician (Breast Surgery)  Sangeeta Tom MD as Consulting Physician (Hematology and Oncology)  Jennifer Mcclendon MD as Consulting Physician (Obstetrics and Gynecology)  Andrei Cohen MD (Inactive) as Consulting Physician (Radiation Oncology)  Marlen Christine MD as Consulting Physician (Hematology and Oncology)    PROBLEM LIST:  1. Obstructive  sleep apnea   2. Obesity (BMI 30-39.9)   3. Palpitations   4. Snoring   5. Witnessed episode of apnea   6. PLMD (periodic limb movement disorder)  7. Sleep related hypoxemia       History of Present Illness:  Jaimie Jeffries is a 64 y.o. female who is here for follow up on DEEPALI.   Patient last seen in the office on 2018.  Patient had a In lab split study that was done on 18 that was positive for severe sleep apnea, overall AHI was 23 but the RDI was 81.3.  There was severe sleep fragmentation with arousal index of 91.2 with sleep related hypoxemia.    Patient was titrated on CPAP and had a good control on a CPAP of 8 with residual AHI of 4.2. Later on she was increased to CPAP 10 for more optimal control in 2018 and she has been on tat pressure since.  Patient did had an right eye lift few days ago and was instructed to stop the CPAP for few days .  Patient uses machine every night with no complaints from the mask or the pressure.  Patient uses a nasal pillows  which duval fit well. Patient denies any problem with air leak or dry mouth.   Patient's equipment supplier is [employer] and last mask replacement was less then 3 months ago.  Patient  sleeps better and has a deeper sleep with the machine and feels more energy during the day time.  Currently on CPAP at 10 cm H20.  [place] Sleepiness Scale (ESS) is down to 1.  Compliance download was reviewed and documented below.  Weight is down by 27 pounds since last visit, she is on weight watchers.  Other comorbidities include palpitation, obesity, and sleep related hypoxemia in addition to the DEEPALI    Review of Systems:   A ten-system review was conducted and was negative .   Please refer to the follow up sleep questionnaire page one for details.    Past Medical History:  Past medical, surgical, social, and family history, except as mentioned above, was unchanged from the last visit.     Past Medical History:   Diagnosis Date   • Breast cancer (CMS/HCC)     Right   • Depression    • Hx of radiation therapy    • PONV (postoperative nausea and vomiting)     PREFER SCOPE PATCH,ZOFRAN,PHENERGAN       Past Surgical History:   Procedure Laterality Date   • ANKLE OPEN REDUCTION INTERNAL FIXATION Right 8/27/2018    Procedure: OPEN REDUCTION INTERNAL FIXATION DISLOCATED RT ANKLE FRACTURE;  Surgeon: Cristian Qureshi MD;  Location: [facility];  Service: Orthopedics   • BREAST BIOPSY Right 2012, 2014    benign   • BREAST BIOPSY Right 2017    malignant   • BREAST LUMPECTOMY     • BREAST LUMPECTOMY WITH SENTINEL NODE BIOPSY Right 2/13/2017    Procedure: BREAST LUMPECTOMY WITH SENTINEL NODE BIOPSY AND NEEDLE LOCALIZATION;  Surgeon: Fred Denny MD;  Location: [facility];  Service:        Home Medications:     Current Outpatient Medications:   •  amitriptyline (ELAVIL) 10 MG tablet, Take 1 tablet by mouth Daily., Disp: 90 tablet, Rfl: 2  •  amitriptyline (ELAVIL) 10 MG tablet, Take 1 tablet by mouth daily, Disp: 90 tablet, Rfl: 2  •  anastrozole (ARIMIDEX) 1 MG tablet, Take 1 tablet by mouth Daily., Disp: 90 tablet, Rfl: 3  •  b complex-C-folic acid 1 MG capsule, Take 1 capsule by mouth Daily., Disp: , Rfl:   •   calcium carbonate-cholecalciferol 500-400 MG-UNIT tablet tablet, Take 1 tablet by mouth Daily., Disp: , Rfl:   •  HYDROcodone-acetaminophen (NORCO) 5-325 MG per tablet, Take 1 tablet by mouth Every 8 (Eight) Hours As Needed for Severe Pain ., Disp: 12 tablet, Rfl: 0  •  melatonin 5 MG tablet tablet, Take 5 mg by mouth Every Night., Disp: , Rfl:   •  Multiple Vitamins-Minerals (MULTIVITAL PO), Take  by mouth., Disp: , Rfl:   •  NON FORMULARY, 1 capsule Daily. NHT Nerium, Disp: , Rfl:   •  venlafaxine XR (EFFEXOR-XR) 75 MG 24 hr capsule, Take 1 capsule by mouth Daily., Disp: 90 capsule, Rfl: 2  •  venlafaxine XR (EFFEXOR-XR) 75 MG 24 hr capsule, Take 1 (one) Capsule by mouth daily, Disp: 90 capsule, Rfl: 2    Allergies:  No Known Allergies    Past Social History:  Social History     Socioeconomic History   • Marital status:      Spouse name: Not on file   • Number of children: 4   • Years of education: College   • Highest education level: Not on file   Occupational History   • Occupation: RN     Employer: [employer]   Tobacco Use   • Smoking status: Never Smoker   • Smokeless tobacco: Never Used   Substance and Sexual Activity   • Alcohol use: Yes     Alcohol/week: 1.2 oz     Types: 2 Glasses of wine per week     Comment: 1-2 glasses of wine a week   • Drug use: No     Comment: Consumes one to 2 cups of caffeine per day usually coffee   • Sexual activity: Yes     Comment:    Social History Narrative    Accompanied by friend Ryan       Past Family History:  Family History   Problem Relation Age of Onset   • Breast cancer Mother 70   • Hearing loss Mother    • Stroke Mother    • Breast cancer Sister 50   • Hypertension Sister    • Hearing loss Father    • Hyperlipidemia Father    • Hypertension Father    • Kidney cancer Sister    • Hypertension Brother    • Stomach cancer Sister          Objective:   Done and documented on sleep disorders center physical examination sheet, please refer  "to hand written note on the chart for details about the other pertinent negative findings.    Vital Signs:   Visit Vitals  /84 (BP Location: Left arm, Patient Position: Sitting)   Pulse 67   Ht 170.2 cm (67\")   Wt 78.6 kg (173 lb 3.2 oz)   SpO2 96%   BMI 27.13 kg/m²     Wt Readings from Last 3 Encounters:   11/25/19 78.6 kg (173 lb 3.2 oz)   08/29/19 79.4 kg (175 lb)   03/12/19 91.2 kg (201 lb)          Physical Exam:   General: AAOx3. Normal mood and affect.   HEENT:  Conjunctiva normal.  PERRLA.  Moist mucous membranes.  Septum midline. Mallampati 4 airway. Edematous uvula.   Neck: Neck supple. Trachea midline.  Normal thyroid.  Swollen, right eyelid from recent surgery  LUNGS: Non-labored breathing. CTAB.  No wheezing.  HEART: regular rate and rhythm. No murmur. Normal s1/s2.  EXTREMITIES: no edema. No cyanosis or clubbing. Normal gait.      Diagnostic Data:  This was a split night study done on 8/20/18  Initial diagnostic half was 2.62 hours in bed was 1.81 hours of sleep with only stage I and stage II sleep noted, the respiratory summary was still positive for severe sleep apnea with RDI of 81.3, the AHI was 23.2.  Patient had positional component with AHI of 34.8 in supine sleep with an RDI of 103.2.  Patient had no significant hypoxemia with evidence of severe sleep fragmentation with arousal index of 91.2 with majority of the arousals being snoring or respiratory events related.  There was no pleuritic leg movement noted.  Patient had severe snoring at 61.77% of total sleep time     During the second half patient was started on positive pressure titration with CPAP of 5 that was started although it up to 8.  On a pressure of 8 we had 230.5 minutes including 29 minutes of REM sleep with good control of the apnea ischemia with AHI of 4.2 and with good sleep efficiency and good tolerance.  The sleep fragmentation did resolve on the CPAP and the patient had good improvement in sleep architecture as " well.    Impression:   Severe DEEPALI with RDI 81.3, AHI 23.2 with supine positional component  Insignificant hypoxemia  Good control on a CPAP of 8  Plan:   Start CPAP at 8 cm of water       Compliance download from last 30 days showed 87% compliance with an average nightly use of 7 hours and 5 minutes with residual AHI of 4.63 on CPAP of 10, leak 95th% 17.6 lpm.      Assessment and Plan:       ICD-10-CM ICD-9-CM   1. DEEPALI on CPAP G47.33 327.23    Z99.89 V46.8   2. Sleep related hypoxia G47.34 327.24   3. Hypersomnia with sleep apnea G47.10 780.53    G47.30    4. Overweight (BMI 25.0-29.9) E66.3 278.02       Recommendations:     Patient is doing well on the CPAP, she is adherent to the treatment, she sleeps better on it, she has clinical and subjective benefit, and the CPAP is effective in controlling her sleep apnea, and it is medically recommended to be continued  She did a great job with weight watchers lost 27 pounds and her goal is to go down to 160 pounds or less, if she can get to the 160 pound she would be down by 20% on her initial body weight which is a significant weight loss to consider reassessing of her sleep apnea had resolved by that time  Her compliance is showing that she did not use it for the last 3 nights simply because she had eye surgery and she is not supposed to use it on told her I heal otherwise she is 100% compliant.    Summary of recommendations:  · Continue with the CPAP at 10  · Follow-up in a year  · Consider repeating the sleep study if she can get to a goal weight of 160 pounds  · Continue with weight loss    No orders of the defined types were placed in this encounter.    No orders of the defined types were placed in this encounter.    No Follow-up on file.    Irineo Hill MD   [facility]   11/25/19  4:30 PM    Dictated utilizing Dragon dictation

## 2020-02-20 ENCOUNTER — HOSPITAL ENCOUNTER (OUTPATIENT)
Dept: MAMMOGRAPHY | Facility: HOSPITAL | Age: 65
Discharge: HOME | End: 2020-02-20
Admitting: INTERNAL MEDICINE

## 2020-02-20 DIAGNOSIS — Z12.39 SCREENING BREAST EXAMINATION: ICD-10-CM

## 2020-02-20 PROCEDURE — 77063 BREAST TOMOSYNTHESIS BI: CPT

## 2020-02-20 PROCEDURE — 77067 SCR MAMMO BI INCL CAD: CPT

## 2020-02-26 ENCOUNTER — LAB (OUTPATIENT)
Dept: LAB | Facility: HOSPITAL | Age: 65
End: 2020-02-26

## 2020-02-26 ENCOUNTER — OFFICE VISIT (OUTPATIENT)
Dept: ONCOLOGY | Facility: CLINIC | Age: 65
End: 2020-02-26

## 2020-02-26 VITALS
HEART RATE: 63 BPM | RESPIRATION RATE: 16 BRPM | BODY MASS INDEX: 27.89 KG/M2 | HEIGHT: 67 IN | OXYGEN SATURATION: 95 % | DIASTOLIC BLOOD PRESSURE: 82 MMHG | WEIGHT: 177.7 LBS | SYSTOLIC BLOOD PRESSURE: 132 MMHG | TEMPERATURE: 98.6 F

## 2020-02-26 DIAGNOSIS — C50.911 INFILTRATING DUCTAL CARCINOMA OF RIGHT BREAST (HCC): Primary | ICD-10-CM

## 2020-02-26 DIAGNOSIS — Z51.81 ENCOUNTER FOR MONITORING AROMATASE INHIBITOR THERAPY: ICD-10-CM

## 2020-02-26 DIAGNOSIS — C50.811 MALIGNANT NEOPLASM OF OVERLAPPING SITES OF RIGHT BREAST IN FEMALE, ESTROGEN RECEPTOR POSITIVE (HCC): ICD-10-CM

## 2020-02-26 DIAGNOSIS — Z17.0 MALIGNANT NEOPLASM OF OVERLAPPING SITES OF RIGHT BREAST IN FEMALE, ESTROGEN RECEPTOR POSITIVE (HCC): ICD-10-CM

## 2020-02-26 DIAGNOSIS — Z79.811 ENCOUNTER FOR MONITORING AROMATASE INHIBITOR THERAPY: ICD-10-CM

## 2020-02-26 DIAGNOSIS — M85.80 OSTEOPENIA, UNSPECIFIED LOCATION: ICD-10-CM

## 2020-02-26 LAB
ALBUMIN SERPL-MCNC: 4.5 G/DL (ref 3.5–5.2)
ALBUMIN/GLOB SERPL: 1.8 G/DL (ref 1.1–2.4)
ALP SERPL-CCNC: 85 U/L (ref 38–116)
ALT SERPL W P-5'-P-CCNC: 21 U/L (ref 0–33)
ANION GAP SERPL CALCULATED.3IONS-SCNC: 13.5 MMOL/L (ref 5–15)
AST SERPL-CCNC: 22 U/L (ref 0–32)
BASOPHILS # BLD AUTO: 0.04 10*3/MM3 (ref 0–0.2)
BASOPHILS NFR BLD AUTO: 0.9 % (ref 0–1.5)
BILIRUB SERPL-MCNC: 0.2 MG/DL (ref 0.2–1.2)
BUN BLD-MCNC: 16 MG/DL (ref 6–20)
BUN/CREAT SERPL: 21.6 (ref 7.3–30)
CALCIUM SPEC-SCNC: 9.5 MG/DL (ref 8.5–10.2)
CHLORIDE SERPL-SCNC: 99 MMOL/L (ref 98–107)
CO2 SERPL-SCNC: 26.5 MMOL/L (ref 22–29)
CREAT BLD-MCNC: 0.74 MG/DL (ref 0.6–1.1)
DEPRECATED RDW RBC AUTO: 45.6 FL (ref 37–54)
EOSINOPHIL # BLD AUTO: 0.08 10*3/MM3 (ref 0–0.4)
EOSINOPHIL NFR BLD AUTO: 1.7 % (ref 0.3–6.2)
ERYTHROCYTE [DISTWIDTH] IN BLOOD BY AUTOMATED COUNT: 12.8 % (ref 12.3–15.4)
GFR SERPL CREATININE-BSD FRML MDRD: 79 ML/MIN/1.73
GLOBULIN UR ELPH-MCNC: 2.5 GM/DL (ref 1.8–3.5)
GLUCOSE BLD-MCNC: 93 MG/DL (ref 74–124)
HCT VFR BLD AUTO: 37 % (ref 34–46.6)
HGB BLD-MCNC: 12 G/DL (ref 12–15.9)
IMM GRANULOCYTES # BLD AUTO: 0.01 10*3/MM3 (ref 0–0.05)
IMM GRANULOCYTES NFR BLD AUTO: 0.2 % (ref 0–0.5)
LYMPHOCYTES # BLD AUTO: 1.92 10*3/MM3 (ref 0.7–3.1)
LYMPHOCYTES NFR BLD AUTO: 41.3 % (ref 19.6–45.3)
MCH RBC QN AUTO: 31.8 PG (ref 26.6–33)
MCHC RBC AUTO-ENTMCNC: 32.4 G/DL (ref 31.5–35.7)
MCV RBC AUTO: 98.1 FL (ref 79–97)
MONOCYTES # BLD AUTO: 0.36 10*3/MM3 (ref 0.1–0.9)
MONOCYTES NFR BLD AUTO: 7.7 % (ref 5–12)
NEUTROPHILS # BLD AUTO: 2.24 10*3/MM3 (ref 1.7–7)
NEUTROPHILS NFR BLD AUTO: 48.2 % (ref 42.7–76)
NRBC BLD AUTO-RTO: 0 /100 WBC (ref 0–0.2)
PLATELET # BLD AUTO: 242 10*3/MM3 (ref 140–450)
PMV BLD AUTO: 8.4 FL (ref 6–12)
POTASSIUM BLD-SCNC: 4.1 MMOL/L (ref 3.5–4.7)
PROT SERPL-MCNC: 7 G/DL (ref 6.3–8)
RBC # BLD AUTO: 3.77 10*6/MM3 (ref 3.77–5.28)
SODIUM BLD-SCNC: 139 MMOL/L (ref 134–145)
WBC NRBC COR # BLD: 4.65 10*3/MM3 (ref 3.4–10.8)

## 2020-02-26 PROCEDURE — 36415 COLL VENOUS BLD VENIPUNCTURE: CPT

## 2020-02-26 PROCEDURE — 85025 COMPLETE CBC W/AUTO DIFF WBC: CPT

## 2020-02-26 PROCEDURE — 80053 COMPREHEN METABOLIC PANEL: CPT

## 2020-02-26 PROCEDURE — 99213 OFFICE O/P EST LOW 20 MIN: CPT | Performed by: NURSE PRACTITIONER

## 2020-02-26 NOTE — PROGRESS NOTES
Subjective     CHIEF COMPLAINT:      Chief Complaint   Patient presents with   • Follow-up       HISTORY OF PRESENT ILLNESS:     Jaimie Jeffries is a 64 y.o. female patient who returns today for follow up on her right breast cancer.  She continues on Arimidex 1 mg daily, on this almost 3 years now out of the 5 that are planned.  She continues to tolerate this very well, denies any significant arthralgias.    She denies any new pain or changes to her breast.  She has chronic slight nerve pain and numbness near the lumpectomy site but this is unchanged.  She reports normal appetite and normal bowel and bladder function.    She did recently undergo bilateral mammogram on 2/20/2020.  Results were reviewed today, benign.    She denies any new concerns today.      REVIEW OF SYSTEMS:  Review of Systems   Constitutional: Negative for chills, fever and unexpected weight change.   HENT: Negative for mouth sores, nosebleeds, sore throat and voice change.    Eyes: Negative for visual disturbance.   Respiratory: Negative for cough and shortness of breath.    Cardiovascular: Negative for chest pain and leg swelling.   Gastrointestinal: Negative for abdominal pain, blood in stool, constipation, diarrhea, nausea and vomiting.   Genitourinary: Negative for dysuria, frequency and hematuria.   Musculoskeletal: Negative for arthralgias, back pain and joint swelling.   Skin: Negative for rash.   Neurological: Negative for dizziness, numbness and headaches.   Hematological: Negative for adenopathy. Does not bruise/bleed easily.   Psychiatric/Behavioral: Negative for dysphoric mood. The patient is not nervous/anxious.      I verified the ROS obtained by the MA.      Past Medical History:   Diagnosis Date   • Breast cancer (CMS/HCC)     Right   • Depression    • Hx of radiation therapy    • PONV (postoperative nausea and vomiting)     PREFER SCOPE PATCH,ZOFRAN,PHENERGAN       Past Surgical History:   Procedure Laterality Date   • ANKLE  OPEN REDUCTION INTERNAL FIXATION Right 8/27/2018    Procedure: OPEN REDUCTION INTERNAL FIXATION DISLOCATED RT ANKLE FRACTURE;  Surgeon: Cristian Qureshi MD;  Location: [facility];  Service: Orthopedics   • BREAST BIOPSY Right 2012, 2014    benign   • BREAST BIOPSY Right 2017    malignant   • BREAST LUMPECTOMY     • BREAST LUMPECTOMY WITH SENTINEL NODE BIOPSY Right 2/13/2017    Procedure: BREAST LUMPECTOMY WITH SENTINEL NODE BIOPSY AND NEEDLE LOCALIZATION;  Surgeon: Fred Denny MD;  Location: [facility];  Service:        Cancer-related family history includes Breast cancer (age of onset: 50) in her sister; Breast cancer (age of onset: 70) in her mother; Kidney cancer in her sister; Stomach cancer in her sister.  Social History     Tobacco Use   • Smoking status: Never Smoker   • Smokeless tobacco: Never Used   Substance Use Topics   • Alcohol use: Yes     Alcohol/week: 2.0 standard drinks     Types: 2 Glasses of wine per week     Comment: 1-2 glasses of wine a week       MEDICATIONS:    Current Outpatient Medications:   •  amitriptyline (ELAVIL) 10 MG tablet, Take 1 tablet by mouth Daily., Disp: 90 tablet, Rfl: 2  •  anastrozole (ARIMIDEX) 1 MG tablet, Take 1 tablet by mouth Daily., Disp: 90 tablet, Rfl: 3  •  b complex-C-folic acid 1 MG capsule, Take 1 capsule by mouth Daily., Disp: , Rfl:   •  calcium carbonate-cholecalciferol 500-400 MG-UNIT tablet tablet, Take 1 tablet by mouth Daily., Disp: , Rfl:   •  melatonin 5 MG tablet tablet, Take 5 mg by mouth Every Night., Disp: , Rfl:   •  Multiple Vitamins-Minerals (MULTIVITAL PO), Take  by mouth., Disp: , Rfl:   •  venlafaxine XR (EFFEXOR-XR) 75 MG 24 hr capsule, Take 1 (one) Capsule by mouth daily, Disp: 90 capsule, Rfl: 2  •  HYDROcodone-acetaminophen (NORCO) 5-325 MG per tablet, Take 1 tablet by mouth Every 8 (Eight) Hours As Needed for Severe Pain ., Disp: 12 tablet, Rfl: 0    ALLERGIES:  No Known Allergies      Objective   VITAL SIGNS:     Vitals:  "   02/26/20 1600   BP: 132/82   Pulse: 63   Resp: 16   Temp: 98.6 °F (37 °C)   SpO2: 95%   Weight: 80.6 kg (177 lb 11.2 oz)   Height: 170 cm (66.93\")   PainSc: 0-No pain     Body mass index is 27.89 kg/m².     Wt Readings from Last 3 Encounters:   02/26/20 80.6 kg (177 lb 11.2 oz)   11/25/19 78.6 kg (173 lb 3.2 oz)   08/29/19 79.4 kg (175 lb)       PHYSICAL EXAMINATION:  GENERAL:  The patient appears in good general condition, not in acute distress.  SKIN: Warm and dry. No skin rashes. No ecchymosis.  HEAD:  Normocephalic.  EYES:  No Jaundice. No Pallor.   NECK:  Supple with Good ROM. No Thyromegaly. No Masses.  LYMPHATICS:  No cervical or supraclavicular or axillary lymphadenopathy.  BREASTS: Right breast exam reveals well-healed incision at 9-12 o'clock position with underlying scar tissue and dense breast tissue, mildly tender to palpation, unchanged. No suspicious masses.  Left breast exam was unremarkable.  CHEST: Normal respiratory effort. Lungs clear to auscultation.   CARDIAC:  No edema.  ABDOMEN:  Soft. No tenderness. No Hepatomegaly. No Splenomegaly. No masses.  NEUROLOGICAL:  No Focal neurological deficits.         DIAGNOSTIC DATA:     Results from last 7 days   Lab Units 02/26/20  1558   WBC 10*3/mm3 4.65   NEUTROS ABS 10*3/mm3 2.24   HEMOGLOBIN g/dL 12.0   HEMATOCRIT % 37.0   PLATELETS 10*3/mm3 242     Results from last 7 days   Lab Units 02/26/20  1558   SODIUM mmol/L 139   POTASSIUM mmol/L 4.1   CHLORIDE mmol/L 99   CO2 mmol/L 26.5   BUN mg/dL 16   CREATININE mg/dL 0.74   CALCIUM mg/dL 9.5   ALBUMIN g/dL 4.50   BILIRUBIN mg/dL 0.2   ALK PHOS U/L 85   ALT (SGPT) U/L 21   AST (SGOT) U/L 22   GLUCOSE mg/dL 93       Assessment/Plan   1.  Right breast invasive ductal carcinoma, grade 2 with associated ductal carcinoma in situ.  · Status post lumpectomy and sentinel lymph node biopsy on 2/13/2017  · Maximum dimension was 4 mm.  · Stage IA (T1a N0 M0).  · ER and UT positive at 90%.  HER-2 " negative.  · Adjuvant chemotherapy was not indicated.  · Post lumpectomy radiation therapy was completed on 4/11/2017.  · Adjuvant Arimidex was started on 4/24/2017.  · Last mammogram was on 2/20/2020, benign.    Exam from today revealed no clinical evidence of recurrence.  She is tolerating Arimidex well, without significant symptoms.  She is a little over half way through her 5-year course of adjuvant therapy.    2.  Osteopenia.she is on calcium and vitamin D.  Last DEXA scan was on 10/20/2018.      3.  Hot flashes secondary to Arimidex.  The improved with Effexor XR 75 mg a day.    4.  Arthralgias secondary to Arimidex.  They improved after the patient had a 30 pound weight loss.     PLAN:  1.  Continue Arimidex for total of 5 years which will be completed in April 2022.  2.  Bilateral mammogram reviewed with patient today, benign.  3.  Patient will return in 6 months for follow-up with Dr. Tom.   4.  She is due for follow-up DEXA scan in October 2020.  She usually has this done through her gynecology office.    Audrey Maciel, APRN  02/26/20

## 2020-03-19 RX ORDER — ANASTROZOLE 1 MG/1
1 TABLET ORAL DAILY
Qty: 90 TABLET | Refills: 3 | Status: SHIPPED | OUTPATIENT
Start: 2020-03-19 | End: 2021-04-06

## 2020-09-10 ENCOUNTER — LAB (OUTPATIENT)
Dept: LAB | Facility: HOSPITAL | Age: 65
End: 2020-09-10

## 2020-09-10 ENCOUNTER — OFFICE VISIT (OUTPATIENT)
Dept: ONCOLOGY | Facility: CLINIC | Age: 65
End: 2020-09-10

## 2020-09-10 VITALS
RESPIRATION RATE: 16 BRPM | TEMPERATURE: 97.7 F | OXYGEN SATURATION: 97 % | SYSTOLIC BLOOD PRESSURE: 145 MMHG | HEART RATE: 67 BPM | WEIGHT: 186.8 LBS | BODY MASS INDEX: 29.32 KG/M2 | HEIGHT: 67 IN | DIASTOLIC BLOOD PRESSURE: 83 MMHG

## 2020-09-10 DIAGNOSIS — D53.9 MACROCYTIC ANEMIA: ICD-10-CM

## 2020-09-10 DIAGNOSIS — Z51.81 ENCOUNTER FOR MONITORING AROMATASE INHIBITOR THERAPY: ICD-10-CM

## 2020-09-10 DIAGNOSIS — Z17.0 MALIGNANT NEOPLASM OF OVERLAPPING SITES OF RIGHT BREAST IN FEMALE, ESTROGEN RECEPTOR POSITIVE (HCC): Primary | ICD-10-CM

## 2020-09-10 DIAGNOSIS — Z79.811 ENCOUNTER FOR MONITORING AROMATASE INHIBITOR THERAPY: ICD-10-CM

## 2020-09-10 DIAGNOSIS — M85.80 OSTEOPENIA, UNSPECIFIED LOCATION: ICD-10-CM

## 2020-09-10 DIAGNOSIS — C50.911 INFILTRATING DUCTAL CARCINOMA OF RIGHT BREAST (HCC): Primary | ICD-10-CM

## 2020-09-10 DIAGNOSIS — C50.811 MALIGNANT NEOPLASM OF OVERLAPPING SITES OF RIGHT BREAST IN FEMALE, ESTROGEN RECEPTOR POSITIVE (HCC): Primary | ICD-10-CM

## 2020-09-10 DIAGNOSIS — Z12.31 ENCOUNTER FOR SCREENING MAMMOGRAM FOR BREAST CANCER: ICD-10-CM

## 2020-09-10 LAB
ALBUMIN SERPL-MCNC: 4.4 G/DL (ref 3.5–5.2)
ALBUMIN/GLOB SERPL: 1.8 G/DL (ref 1.1–2.4)
ALP SERPL-CCNC: 82 U/L (ref 38–116)
ALT SERPL W P-5'-P-CCNC: 14 U/L (ref 0–33)
ANION GAP SERPL CALCULATED.3IONS-SCNC: 9.4 MMOL/L (ref 5–15)
AST SERPL-CCNC: 19 U/L (ref 0–32)
BASOPHILS # BLD AUTO: 0.03 10*3/MM3 (ref 0–0.2)
BASOPHILS NFR BLD AUTO: 0.6 % (ref 0–1.5)
BILIRUB SERPL-MCNC: <0.2 MG/DL (ref 0.2–1.2)
BUN SERPL-MCNC: 12 MG/DL (ref 6–20)
BUN/CREAT SERPL: 16.4 (ref 7.3–30)
CALCIUM SPEC-SCNC: 9.4 MG/DL (ref 8.5–10.2)
CHLORIDE SERPL-SCNC: 103 MMOL/L (ref 98–107)
CO2 SERPL-SCNC: 25.6 MMOL/L (ref 22–29)
CREAT SERPL-MCNC: 0.73 MG/DL (ref 0.6–1.1)
DEPRECATED RDW RBC AUTO: 43.3 FL (ref 37–54)
EOSINOPHIL # BLD AUTO: 0.1 10*3/MM3 (ref 0–0.4)
EOSINOPHIL NFR BLD AUTO: 2 % (ref 0.3–6.2)
ERYTHROCYTE [DISTWIDTH] IN BLOOD BY AUTOMATED COUNT: 12.5 % (ref 12.3–15.4)
FERRITIN SERPL-MCNC: 127 NG/ML (ref 13–150)
FOLATE SERPL-MCNC: >20 NG/ML (ref 4.78–24.2)
GFR SERPL CREATININE-BSD FRML MDRD: 81 ML/MIN/1.73
GLOBULIN UR ELPH-MCNC: 2.5 GM/DL (ref 1.8–3.5)
GLUCOSE SERPL-MCNC: 91 MG/DL (ref 74–124)
HCT VFR BLD AUTO: 36 % (ref 34–46.6)
HGB BLD-MCNC: 11.7 G/DL (ref 12–15.9)
IMM GRANULOCYTES # BLD AUTO: 0.01 10*3/MM3 (ref 0–0.05)
IMM GRANULOCYTES NFR BLD AUTO: 0.2 % (ref 0–0.5)
IRON 24H UR-MRATE: 58 MCG/DL (ref 37–145)
IRON SATN MFR SERPL: 18 % (ref 20–50)
LYMPHOCYTES # BLD AUTO: 1.86 10*3/MM3 (ref 0.7–3.1)
LYMPHOCYTES NFR BLD AUTO: 37.2 % (ref 19.6–45.3)
MCH RBC QN AUTO: 31.3 PG (ref 26.6–33)
MCHC RBC AUTO-ENTMCNC: 32.5 G/DL (ref 31.5–35.7)
MCV RBC AUTO: 96.3 FL (ref 79–97)
MONOCYTES # BLD AUTO: 0.37 10*3/MM3 (ref 0.1–0.9)
MONOCYTES NFR BLD AUTO: 7.4 % (ref 5–12)
NEUTROPHILS NFR BLD AUTO: 2.63 10*3/MM3 (ref 1.7–7)
NEUTROPHILS NFR BLD AUTO: 52.6 % (ref 42.7–76)
NRBC BLD AUTO-RTO: 0 /100 WBC (ref 0–0.2)
PLATELET # BLD AUTO: 246 10*3/MM3 (ref 140–450)
PMV BLD AUTO: 8.3 FL (ref 6–12)
POTASSIUM SERPL-SCNC: 4.1 MMOL/L (ref 3.5–4.7)
PROT SERPL-MCNC: 6.9 G/DL (ref 6.3–8)
RBC # BLD AUTO: 3.74 10*6/MM3 (ref 3.77–5.28)
SODIUM SERPL-SCNC: 138 MMOL/L (ref 134–145)
TIBC SERPL-MCNC: 329 MCG/DL (ref 298–536)
TRANSFERRIN SERPL-MCNC: 221 MG/DL (ref 200–360)
VIT B12 BLD-MCNC: 1068 PG/ML (ref 211–946)
WBC # BLD AUTO: 5 10*3/MM3 (ref 3.4–10.8)

## 2020-09-10 PROCEDURE — 36415 COLL VENOUS BLD VENIPUNCTURE: CPT

## 2020-09-10 PROCEDURE — 82607 VITAMIN B-12: CPT | Performed by: INTERNAL MEDICINE

## 2020-09-10 PROCEDURE — 82728 ASSAY OF FERRITIN: CPT | Performed by: INTERNAL MEDICINE

## 2020-09-10 PROCEDURE — 84466 ASSAY OF TRANSFERRIN: CPT | Performed by: INTERNAL MEDICINE

## 2020-09-10 PROCEDURE — 82746 ASSAY OF FOLIC ACID SERUM: CPT | Performed by: INTERNAL MEDICINE

## 2020-09-10 PROCEDURE — 83540 ASSAY OF IRON: CPT | Performed by: INTERNAL MEDICINE

## 2020-09-10 PROCEDURE — 85025 COMPLETE CBC W/AUTO DIFF WBC: CPT

## 2020-09-10 PROCEDURE — 99214 OFFICE O/P EST MOD 30 MIN: CPT | Performed by: INTERNAL MEDICINE

## 2020-09-10 PROCEDURE — 80053 COMPREHEN METABOLIC PANEL: CPT

## 2020-09-10 NOTE — PROGRESS NOTES
Subjective     CHIEF COMPLAINT:      Chief Complaint   Patient presents with   • Follow-up       HISTORY OF PRESENT ILLNESS:     Jaimie Jeffries is a 64 y.o. female patient who returns today for follow up on her right breast cancer.  She is on adjuvant Arimidex.  She is tolerating it well.  She is no longer having significant hot flashes.  No mood changes.  She is not experiencing pain in the joints.    Patient states that she forgot to take her Effexor 1 day.  The following day, she developed nausea and vomiting and was not feeling well.  She started feeling better once she took the Effexor.    Patient reports intermittent pain in the right breast.  The pain is mainly in the outer aspect of the breast.  She did not notice any skin changes.    REVIEW OF SYSTEMS:  Review of Systems   Constitutional: Negative for fatigue, fever and unexpected weight change.        Hot flashes   HENT: Negative for nosebleeds and voice change.    Eyes: Negative for visual disturbance.   Respiratory: Negative for cough and shortness of breath.    Cardiovascular: Negative for chest pain and leg swelling.   Gastrointestinal: Positive for nausea and vomiting. Negative for abdominal pain, blood in stool, constipation and diarrhea.   Genitourinary: Negative for frequency and hematuria.   Musculoskeletal: Negative for arthralgias, back pain and joint swelling.   Skin: Negative for rash.   Neurological: Negative for dizziness and headaches.   Hematological: Negative for adenopathy. Does not bruise/bleed easily.   Psychiatric/Behavioral: Negative for dysphoric mood. The patient is not nervous/anxious.        Past Medical History:   Diagnosis Date   • Breast cancer (CMS/HCC)     Right   • Depression    • Hx of radiation therapy    • PONV (postoperative nausea and vomiting)     PREFER SCOPE PATCH,ZOFRAN,PHENERGAN       Past Surgical History:   Procedure Laterality Date   • ANKLE OPEN REDUCTION INTERNAL FIXATION Right 8/27/2018    Procedure: OPEN  REDUCTION INTERNAL FIXATION DISLOCATED RT ANKLE FRACTURE;  Surgeon: Cristian Qureshi MD;  [place] [facility] [place] [facility] [place]  Service: [place]opedics   • BREAST BIOPSY Right 2012, 2014    benign   • BREAST BIOPSY Right 2017    malignant   • BREAST LUMPECTOMY     • BREAST LUMPECTOMY WITH SENTINEL NODE BIOPSY Right 2/13/2017    Procedure: BREAST LUMPECTOMY WITH SENTINEL NODE BIOPSY AND NEEDLE LOCALIZATION;  Surgeon: Fred Denny MD;  [place] [facility] [place] [facility] [place]  Service:        Cancer-related family history includes Breast cancer (age of onset: 50) in her sister; Breast cancer (age of onset: 70) in her mother; Kidney cancer in her sister; Stomach cancer in her sister.  Social History     Tobacco Use   • Smoking status: Never Smoker   • Smokeless tobacco: Never Used   Substance Use Topics   • Alcohol use: Yes     Alcohol/week: 2.0 standard drinks     Types: 2 Glasses of wine per week     Comment: 1-2 glasses of wine a week       MEDICATIONS:    Current Outpatient Medications:   •  amitriptyline (ELAVIL) 10 MG tablet, Take 1 tablet by mouth Daily., Disp: 90 tablet, Rfl: 2  •  anastrozole (Arimidex) 1 MG tablet, Take 1 tablet by mouth Daily., Disp: 90 tablet, Rfl: 3  •  b complex-C-folic acid 1 MG capsule, Take 1 capsule by mouth Daily., Disp: , Rfl:   •  calcium carbonate-cholecalciferol 500-400 MG-UNIT tablet tablet, Take 1 tablet by mouth Daily., Disp: , Rfl:   •  HYDROcodone-acetaminophen (NORCO) 5-325 MG per tablet, Take 1 tablet by mouth Every 8 (Eight) Hours As Needed for Severe Pain ., Disp: 12 tablet, Rfl: 0  •  melatonin 5 MG tablet tablet, Take 5 mg by mouth Every Night., Disp: , Rfl:   •  Multiple Vitamins-Minerals (MULTIVITAL PO), Take  by mouth., Disp: , Rfl:   •  venlafaxine XR (EFFEXOR-XR) 75 MG 24 hr capsule, Take 1 Capsule by mouth daily, Disp: 90 capsule, Rfl: 0  •  venlafaxine XR (EFFEXOR-XR) 75 MG 24 hr capsule, Take 1 (one) Capsule by mouth daily, Disp: 90 capsule, Rfl: 2    ALLERGIES:  No Known  "Allergies      Objective   VITAL SIGNS:     Vitals:    09/10/20 1532   BP: 145/83   Pulse: 67   Resp: 16   Temp: 97.7 °F (36.5 °C)   TempSrc: Skin   SpO2: 97%   Weight: 84.7 kg (186 lb 12.8 oz)  Comment: new weight   Height: 170 cm (66.93\")  Comment: new height   PainSc: 0-No pain     Body mass index is 29.32 kg/m².     Wt Readings from Last 3 Encounters:   09/10/20 84.7 kg (186 lb 12.8 oz)   02/26/20 80.6 kg (177 lb 11.2 oz)   11/25/19 78.6 kg (173 lb 3.2 oz)       PHYSICAL EXAMINATION:  GENERAL:  The patient appears in good general condition, not in acute distress.  SKIN: No skin rashes. No ecchymosis.  HEAD:  Normocephalic.  EYES:  No Jaundice. No Pallor. Pupils equal. EOMI.  NECK:  Supple with Good ROM. No Thyromegaly. No Masses.  LYMPHATICS:  No cervical or supraclavicular or axillary lymphadenopathy.  BREASTS: Right breast exam reveals surgical incision at the upper outer quadrant with underlying scar tissue and some tenderness.  At the 8 o'clock position, there is a hardened area underlying the small incision likely representing prior biopsy.  Bilateral fibrocystic changes, more on the right.  Left breast exam otherwise unremarkable.  CHEST: Normal respiratory effort. Lungs clear to auscultation.   CARDIAC:  Normal S1 & S2. No murmur. No edema.  ABDOMEN:  Soft. No tenderness. No Hepatomegaly. No Splenomegaly. No masses.  EXTREMITIES:  No clubbing. No deforming arthritis in the hands. No Calf tenderness.  NEUROLOGICAL:  No Focal neurological deficits.       DIAGNOSTIC DATA:     Results from last 7 days   Lab Units 09/10/20  1540   WBC 10*3/mm3 5.00   NEUTROS ABS 10*3/mm3 2.63   HEMOGLOBIN g/dL 11.7*   HEMATOCRIT % 36.0   PLATELETS 10*3/mm3 246     Results from last 7 days   Lab Units 09/10/20  1621 09/10/20  1540   SODIUM mmol/L  --  138   POTASSIUM mmol/L  --  4.1   CHLORIDE mmol/L  --  103   CO2 mmol/L  --  25.6   BUN mg/dL  --  12   CREATININE mg/dL  --  0.73   CALCIUM mg/dL  --  9.4   ALBUMIN g/dL  --  " 4.40   BILIRUBIN mg/dL  --  <0.2*   ALK PHOS U/L  --  82   ALT (SGPT) U/L  --  14   AST (SGOT) U/L  --  19   GLUCOSE mg/dL  --  91   FERRITIN ng/mL 127.00  --    IRON mcg/dL 58  --    TIBC mcg/dL 329  --      Component      Latest Ref Rng & Units 9/10/2020   Iron      37 - 145 mcg/dL 58   Iron Saturation      20 - 50 % 18 (L)   Transferrin      200 - 360 mg/dL 221   TIBC      298 - 536 mcg/dL 329   Vitamin B-12      211 - 946 pg/mL 1,068 (H)   Folate      4.78 - 24.20 ng/mL >20.00   Ferritin      13.00 - 150.00 ng/mL 127.00       Assessment/Plan   1.  Right breast invasive ductal carcinoma, grade 2 with associated ductal carcinoma in situ.  · Status post lumpectomy and sentinel lymph node biopsy on 2/13/2017  · Maximum dimension was 4 mm.  · Stage IA (T1a N0 M0).  · ER and IA positive at 90%.  HER-2 negative.  · Adjuvant chemotherapy was not indicated.  · Post lumpectomy radiation therapy was completed on 4/11/2017.  · Adjuvant Arimidex was started on 4/24/2017.  · Mammograms from 2/20/2020 were benign.  · Patient reports pain in the right side of the breast.  Exam showed changes related to the laminectomy as well as a biopsy with placement of a marker.  No suspicious findings on exam today.  · Patient is tolerating Arimidex well.    2.  Osteopenia.she is on calcium and vitamin D.  Next bone density will be due in October/November 2020.     3.  Hot flashes secondary to Arimidex.  Patient started experiencing the hot flashes after she was started on Arimidex.  She is no longer experiencing them and this is likely secondary to the effect of Effexor.    4.  Anemia.  This is a new finding.  We obtained anemia work-up and she has iron deficiency based on low transferrin saturation of 18%.  B12 and folate are normal.  This is likely dietary.    PLAN:    1.  Continue Arimidex.  5 years will be completed in April 2022.  2.  Continue Effexor 75 mg daily.  3.  Start ferrous sulfate 325 mg daily.  4.  Await upcoming bone  density in October/November 2020 that is going to be done at her gynecologist office.  5.  We will schedule bilateral mammograms in February 2021.  6.  Patient will see the Nurse Practitioner in March 2021 with [facility].  She will see the physician in 1 year with [facility].        Sangeeta Tom MD  09/10/20

## 2020-09-11 ENCOUNTER — TELEPHONE (OUTPATIENT)
Dept: ONCOLOGY | Facility: HOSPITAL | Age: 65
End: 2020-09-11

## 2020-09-11 RX ORDER — LANOLIN ALCOHOL/MO/W.PET/CERES
325 CREAM (GRAM) TOPICAL
Qty: 30 TABLET | Refills: 1 | Status: SHIPPED | OUTPATIENT
Start: 2020-09-11 | End: 2021-08-16

## 2020-09-11 NOTE — TELEPHONE ENCOUNTER
----- Message from Sangeeta Tom MD sent at 9/10/2020  5:45 PM EDT -----  Please inform the patient that her anemia work-up showed iron deficiency.  Please ask her to start ferrous sulfate 325 mg daily.  B12 and folate were normal.    Thank you

## 2020-09-11 NOTE — TELEPHONE ENCOUNTER
Script sent to pt's pharmacy. Message left for pt relaying Dr. Tom message and to call with any questions.

## 2020-09-14 LAB
Lab: NORMAL
METHYLMALONATE SERPL-SCNC: 127 NMOL/L (ref 0–378)

## 2020-11-10 ENCOUNTER — TRANSCRIBE ORDERS (OUTPATIENT)
Dept: ADMINISTRATIVE | Facility: HOSPITAL | Age: 65
End: 2020-11-10

## 2020-11-10 DIAGNOSIS — Z78.0 POSTMENOPAUSAL: Primary | ICD-10-CM

## 2020-11-10 DIAGNOSIS — N64.4 BREAST PAIN, RIGHT: ICD-10-CM

## 2020-11-23 ENCOUNTER — HOSPITAL ENCOUNTER (OUTPATIENT)
Dept: ULTRASOUND IMAGING | Facility: HOSPITAL | Age: 65
Discharge: HOME | End: 2020-11-23

## 2020-11-23 ENCOUNTER — OFFICE VISIT (OUTPATIENT)
Dept: SLEEP MEDICINE | Facility: HOSPITAL | Age: 65
End: 2020-11-23

## 2020-11-23 ENCOUNTER — HOSPITAL ENCOUNTER (OUTPATIENT)
Dept: MAMMOGRAPHY | Facility: HOSPITAL | Age: 65
Discharge: HOME | End: 2020-11-23

## 2020-11-23 VITALS
OXYGEN SATURATION: 94 % | DIASTOLIC BLOOD PRESSURE: 87 MMHG | HEART RATE: 68 BPM | BODY MASS INDEX: 30.57 KG/M2 | HEIGHT: 66 IN | SYSTOLIC BLOOD PRESSURE: 136 MMHG | WEIGHT: 190.2 LBS

## 2020-11-23 DIAGNOSIS — N64.4 BREAST PAIN, RIGHT: ICD-10-CM

## 2020-11-23 DIAGNOSIS — G47.33 OBSTRUCTIVE SLEEP APNEA, ADULT: Primary | ICD-10-CM

## 2020-11-23 PROCEDURE — G0463 HOSPITAL OUTPT CLINIC VISIT: HCPCS

## 2020-11-23 PROCEDURE — 77065 DX MAMMO INCL CAD UNI: CPT

## 2020-11-23 PROCEDURE — 76642 ULTRASOUND BREAST LIMITED: CPT

## 2020-11-23 PROCEDURE — G0279 TOMOSYNTHESIS, MAMMO: HCPCS

## 2020-11-23 NOTE — PROGRESS NOTES
[facility]                          Chief Complaint:   Follow up for obstructive sleep apnea, obesity and comorbid depression    History of present illness:   Subjective   This is a 65-year-old female patient, with history of sleep apnea who is here today for yearly follow-up.    DEEPALI:  PSG on 2018 showed AHI 23 and RDI 81.  Patient has been on CPAP therapy since then.  She denies any issues with air leak or pressure intolerance.    Sleep schedule:  -Bedtime: 830-9 PM  -Sleep latency: Not too long  -Wake up time: 6 AM, does not feel refreshed  -Nocturnal awakenin times because of nocturia.  No difficulties going back to sleep.  -Perceived sleep hours: 8-9    Mask: Nasal pillows which does fit well.  No significant air leak or dry mouth  DME: [employer]    ESS: Total score: 0     Obesity:  No special exercise or diet.  Weight is steady.    Depression:  She denies current depressive symptoms but reported anxiety secondary versus menopause.  She is on both amitriptyline and Effexor for and she said that she is trying to wean herself off Effexor.    REVIEW OF SYSTEMS:   HEENT: No nasal congestion or postnasal drip   CARDIOVASCULAR: No chest pain, chest pressure or chest discomfort. No palpitations or edema.   RESPIRATORY: No shortness of breath, cough or sputum.   GASTROINTESTINAL: No abdominal bloating or reflux   NEUROLOGICAL/PSYCHOATRY: No depression nor anxiety  Constitutional: No fever or chills  Endocrine: No excessive thirst, cold or warm times but reported postmenopausal symptoms treated currently with venlafaxine and amitriptyline    Past Medical History:  Past Medical History:   Diagnosis Date   • Breast cancer (CMS/HCC)     Right   • Depression    • Hx of radiation therapy    • PONV (postoperative nausea and vomiting)     PREFER SCOPE PATCH,ZOFRAN,PHENERGAN   ,   Past Surgical History:   Procedure Laterality Date   • ANKLE OPEN REDUCTION  INTERNAL FIXATION Right 8/27/2018    Procedure: OPEN REDUCTION INTERNAL FIXATION DISLOCATED RT ANKLE FRACTURE;  Surgeon: Cristian Qureshi MD;  [place] [facility] [place] [facility] [place]  Service: [place]opedics   • BREAST BIOPSY Right 2012, 2014    benign   • BREAST BIOPSY Right 2017    malignant   • BREAST LUMPECTOMY     • BREAST LUMPECTOMY WITH SENTINEL NODE BIOPSY Right 2/13/2017    Procedure: BREAST LUMPECTOMY WITH SENTINEL NODE BIOPSY AND NEEDLE LOCALIZATION;  Surgeon: Fred Denny MD;  Location: [facility];  Service:    ,   Family History   Problem Relation Age of Onset   • Breast cancer Mother 70   • Hearing loss Mother    • Stroke Mother    • Breast cancer Sister 50   • Hypertension Sister    • Hearing loss Father    • Hyperlipidemia Father    • Hypertension Father    • Kidney cancer Sister    • Hypertension Brother    • Stomach cancer Sister    ,   Social History     Tobacco Use   • Smoking status: Never Smoker   • Smokeless tobacco: Never Used   Substance Use Topics   • Alcohol use: Yes     Alcohol/week: 2.0 standard drinks     Types: 2 Glasses of wine per week     Comment: 1-2 glasses of wine a week   • Drug use: No     Comment: Consumes one to 2 cups of caffeine per day usually coffee        and Allergies:  Patient has no known allergies.    Medication Review:     Current Outpatient Medications:   •  amitriptyline (ELAVIL) 10 MG tablet, Take 1 tablet by mouth daily, Disp: 90 tablet, Rfl: 3  •  amitriptyline (ELAVIL) 10 MG tablet, Take 1 tablet by mouth daily, Disp: 90 tablet, Rfl: 3  •  anastrozole (Arimidex) 1 MG tablet, Take 1 tablet by mouth Daily., Disp: 90 tablet, Rfl: 3  •  b complex-C-folic acid 1 MG capsule, Take 1 capsule by mouth Daily., Disp: , Rfl:   •  calcium carbonate-cholecalciferol 500-400 MG-UNIT tablet tablet, Take 1 tablet by mouth Daily., Disp: , Rfl:   •  ferrous sulfate 325 (65 FE) MG EC tablet, Take 1 tablet by mouth Daily With Breakfast., Disp: 30 tablet, Rfl: 1  •   "HYDROcodone-acetaminophen (NORCO) 5-325 MG per tablet, Take 1 tablet by mouth Every 8 (Eight) Hours As Needed for Severe Pain ., Disp: 12 tablet, Rfl: 0  •  melatonin 5 MG tablet tablet, Take 5 mg by mouth Every Night., Disp: , Rfl:   •  meloxicam (MOBIC) 15 MG tablet, Take 1 tablet by mouth Daily., Disp: 30 tablet, Rfl: 0  •  Multiple Vitamins-Minerals (MULTIVITAL PO), Take  by mouth., Disp: , Rfl:   •  venlafaxine XR (EFFEXOR-XR) 37.5 MG 24 hr capsule, Take 1 capsule by mouth Daily., Disp: 90 capsule, Rfl: 4  •  venlafaxine XR (EFFEXOR-XR) 75 MG 24 hr capsule, Take 1 Capsule by mouth daily, Disp: 90 capsule, Rfl: 0        Objective   Vital Signs:  Vitals:    11/23/20 1500   BP: 136/87   Pulse: 68   SpO2: 94%   Weight: 86.3 kg (190 lb 3.2 oz)   Height: 167.6 cm (66\")     Body mass index is 30.7 kg/m².          Physical Exam:   General Appearance:    Alert, cooperative, in no acute distress   ENMT:  Freidman and mallampati score 4. Scalloped tongue.    Eyes: Injected conjunctiva. EOMI.    Neck:  Trachea midline. No thyromegaly.   Lungs:     Clear to auscultation,respirations regular, even and                  unlabored    Heart:    Regular rhythm and normal rate, normal S1 and S2, no            Murmur.   Abdomen:     Obese.  Soft.  No tenderness.  No HSM    Integumentary:  No rash or echymosis   Neuro:   Conscious, alert, oriented x3. Strength 5/5 in arms and legs. Gait is normal. Appropriate mood and affect.    Extremities:   Moves all extremities well, no edema, no cyanosis, no             Redness. Warm extremities and well perfused.              Diagnostic data:  Polysomnography was performed on: 8/24/2018  AHI= 23/h; Supine AHI: 34/h; RDI = 81    CPAP download showed:  Date: Last 90 days  Usage (days): 100%  Days used>4h: 97%  AHI: 4.8/h  Leak 95%: 12  Usage: 8 h and 8 min  CPAP: 10 cm H2O    Assessment   1. DEEPALI, on CPAP  2. Obesity (BMI = 30)  3. Depression, unspecified    All problems new to " me    PLAN:    Discussed the result of the download.   Patient is compliant with therapy and clinically benefit from treatment.  Patient was encouraged to continue using his CPAP.  Refill supplies.    Counseled for weight loss.  Encouraged to exercise regularly and cut down on carbohydrates.  Discussed that losing weight may decrease the severity of sleep apnea and obviate the need of CPAP therapy.    Discussed the association between obstructive sleep apnea and mood disorders including depression and the beneficial effect of Pap therapy              This note was dictated utilizing Dragon dictation

## 2020-12-02 ENCOUNTER — APPOINTMENT (OUTPATIENT)
Dept: ULTRASOUND IMAGING | Facility: HOSPITAL | Age: 65
End: 2020-12-02

## 2020-12-18 ENCOUNTER — HOSPITAL ENCOUNTER (OUTPATIENT)
Dept: BONE DENSITY | Facility: HOSPITAL | Age: 65
Discharge: HOME | End: 2020-12-18
Admitting: OBSTETRICS & GYNECOLOGY

## 2020-12-18 DIAGNOSIS — Z78.0 POSTMENOPAUSAL: ICD-10-CM

## 2020-12-18 PROCEDURE — 77080 DXA BONE DENSITY AXIAL: CPT

## 2020-12-19 ENCOUNTER — IMMUNIZATION (OUTPATIENT)
Dept: VACCINE CLINIC | Facility: HOSPITAL | Age: 65
End: 2020-12-19

## 2020-12-19 PROCEDURE — 91300 HC SARSCOV02 VAC 30MCG/0.3ML IM: CPT | Performed by: INTERNAL MEDICINE

## 2020-12-19 PROCEDURE — 0001A: CPT | Performed by: INTERNAL MEDICINE

## 2021-01-11 ENCOUNTER — IMMUNIZATION (OUTPATIENT)
Dept: VACCINE CLINIC | Facility: HOSPITAL | Age: 66
End: 2021-01-11

## 2021-01-11 PROCEDURE — 0002A: CPT | Performed by: INTERNAL MEDICINE

## 2021-01-11 PROCEDURE — 0001A: CPT | Performed by: INTERNAL MEDICINE

## 2021-01-11 PROCEDURE — 91300 HC SARSCOV02 VAC 30MCG/0.3ML IM: CPT | Performed by: INTERNAL MEDICINE

## 2021-02-22 ENCOUNTER — HOSPITAL ENCOUNTER (OUTPATIENT)
Dept: MAMMOGRAPHY | Facility: HOSPITAL | Age: 66
Discharge: HOME | End: 2021-02-22
Admitting: INTERNAL MEDICINE

## 2021-02-22 DIAGNOSIS — C50.911 INFILTRATING DUCTAL CARCINOMA OF RIGHT BREAST (HCC): ICD-10-CM

## 2021-02-22 DIAGNOSIS — Z12.31 ENCOUNTER FOR SCREENING MAMMOGRAM FOR BREAST CANCER: ICD-10-CM

## 2021-02-22 PROCEDURE — 77067 SCR MAMMO BI INCL CAD: CPT

## 2021-02-22 PROCEDURE — 77063 BREAST TOMOSYNTHESIS BI: CPT

## 2021-03-10 ENCOUNTER — LAB (OUTPATIENT)
Dept: LAB | Facility: HOSPITAL | Age: 66
End: 2021-03-10

## 2021-03-10 ENCOUNTER — OFFICE VISIT (OUTPATIENT)
Dept: ONCOLOGY | Facility: CLINIC | Age: 66
End: 2021-03-10

## 2021-03-10 VITALS
BODY MASS INDEX: 29.07 KG/M2 | DIASTOLIC BLOOD PRESSURE: 86 MMHG | TEMPERATURE: 97.8 F | RESPIRATION RATE: 16 BRPM | HEART RATE: 70 BPM | HEIGHT: 66 IN | SYSTOLIC BLOOD PRESSURE: 134 MMHG | WEIGHT: 180.9 LBS | OXYGEN SATURATION: 95 %

## 2021-03-10 DIAGNOSIS — C50.911 INFILTRATING DUCTAL CARCINOMA OF RIGHT BREAST (HCC): ICD-10-CM

## 2021-03-10 DIAGNOSIS — Z79.811 ENCOUNTER FOR MONITORING AROMATASE INHIBITOR THERAPY: ICD-10-CM

## 2021-03-10 DIAGNOSIS — M85.80 OSTEOPENIA, UNSPECIFIED LOCATION: ICD-10-CM

## 2021-03-10 DIAGNOSIS — D53.9 MACROCYTIC ANEMIA: ICD-10-CM

## 2021-03-10 DIAGNOSIS — M85.80 OSTEOPENIA, UNSPECIFIED LOCATION: Primary | ICD-10-CM

## 2021-03-10 DIAGNOSIS — Z51.81 ENCOUNTER FOR MONITORING AROMATASE INHIBITOR THERAPY: ICD-10-CM

## 2021-03-10 LAB
ALBUMIN SERPL-MCNC: 4.6 G/DL (ref 3.5–5.2)
ALBUMIN/GLOB SERPL: 1.6 G/DL (ref 1.1–2.4)
ALP SERPL-CCNC: 102 U/L (ref 38–116)
ALT SERPL W P-5'-P-CCNC: 25 U/L (ref 0–33)
ANION GAP SERPL CALCULATED.3IONS-SCNC: 9 MMOL/L (ref 5–15)
AST SERPL-CCNC: 22 U/L (ref 0–32)
BASOPHILS # BLD AUTO: 0.03 10*3/MM3 (ref 0–0.2)
BASOPHILS NFR BLD AUTO: 0.5 % (ref 0–1.5)
BILIRUB SERPL-MCNC: <0.2 MG/DL (ref 0.2–1.2)
BUN SERPL-MCNC: 20 MG/DL (ref 6–20)
BUN/CREAT SERPL: 26.7 (ref 7.3–30)
CALCIUM SPEC-SCNC: 10 MG/DL (ref 8.5–10.2)
CHLORIDE SERPL-SCNC: 101 MMOL/L (ref 98–107)
CO2 SERPL-SCNC: 28 MMOL/L (ref 22–29)
CREAT SERPL-MCNC: 0.75 MG/DL (ref 0.6–1.1)
DEPRECATED RDW RBC AUTO: 44 FL (ref 37–54)
EOSINOPHIL # BLD AUTO: 0.09 10*3/MM3 (ref 0–0.4)
EOSINOPHIL NFR BLD AUTO: 1.6 % (ref 0.3–6.2)
ERYTHROCYTE [DISTWIDTH] IN BLOOD BY AUTOMATED COUNT: 12.2 % (ref 12.3–15.4)
FERRITIN SERPL-MCNC: 194.8 NG/ML (ref 13–150)
GFR SERPL CREATININE-BSD FRML MDRD: 78 ML/MIN/1.73
GLOBULIN UR ELPH-MCNC: 2.8 GM/DL (ref 1.8–3.5)
GLUCOSE SERPL-MCNC: 97 MG/DL (ref 74–124)
HCT VFR BLD AUTO: 40.2 % (ref 34–46.6)
HGB BLD-MCNC: 13.2 G/DL (ref 12–15.9)
IMM GRANULOCYTES # BLD AUTO: 0.01 10*3/MM3 (ref 0–0.05)
IMM GRANULOCYTES NFR BLD AUTO: 0.2 % (ref 0–0.5)
IRON 24H UR-MRATE: 58 MCG/DL (ref 37–145)
IRON SATN MFR SERPL: 18 % (ref 14–48)
LYMPHOCYTES # BLD AUTO: 1.98 10*3/MM3 (ref 0.7–3.1)
LYMPHOCYTES NFR BLD AUTO: 34.3 % (ref 19.6–45.3)
MAGNESIUM SERPL-MCNC: 1.8 MG/DL (ref 1.8–2.5)
MCH RBC QN AUTO: 32 PG (ref 26.6–33)
MCHC RBC AUTO-ENTMCNC: 32.8 G/DL (ref 31.5–35.7)
MCV RBC AUTO: 97.6 FL (ref 79–97)
MONOCYTES # BLD AUTO: 0.35 10*3/MM3 (ref 0.1–0.9)
MONOCYTES NFR BLD AUTO: 6.1 % (ref 5–12)
NEUTROPHILS NFR BLD AUTO: 3.32 10*3/MM3 (ref 1.7–7)
NEUTROPHILS NFR BLD AUTO: 57.3 % (ref 42.7–76)
NRBC BLD AUTO-RTO: 0 /100 WBC (ref 0–0.2)
PHOSPHATE SERPL-MCNC: 2.8 MG/DL (ref 2.5–4.5)
PLATELET # BLD AUTO: 266 10*3/MM3 (ref 140–450)
PMV BLD AUTO: 8.3 FL (ref 6–12)
POTASSIUM SERPL-SCNC: 4.2 MMOL/L (ref 3.5–4.7)
PROT SERPL-MCNC: 7.4 G/DL (ref 6.3–8)
RBC # BLD AUTO: 4.12 10*6/MM3 (ref 3.77–5.28)
SODIUM SERPL-SCNC: 138 MMOL/L (ref 134–145)
TIBC SERPL-MCNC: 318 MCG/DL (ref 249–505)
TRANSFERRIN SERPL-MCNC: 227 MG/DL (ref 200–360)
WBC # BLD AUTO: 5.78 10*3/MM3 (ref 3.4–10.8)

## 2021-03-10 PROCEDURE — 84100 ASSAY OF PHOSPHORUS: CPT

## 2021-03-10 PROCEDURE — 83540 ASSAY OF IRON: CPT

## 2021-03-10 PROCEDURE — 84466 ASSAY OF TRANSFERRIN: CPT

## 2021-03-10 PROCEDURE — 80053 COMPREHEN METABOLIC PANEL: CPT

## 2021-03-10 PROCEDURE — 99213 OFFICE O/P EST LOW 20 MIN: CPT | Performed by: NURSE PRACTITIONER

## 2021-03-10 PROCEDURE — 85025 COMPLETE CBC W/AUTO DIFF WBC: CPT

## 2021-03-10 PROCEDURE — 83735 ASSAY OF MAGNESIUM: CPT

## 2021-03-10 PROCEDURE — 36415 COLL VENOUS BLD VENIPUNCTURE: CPT

## 2021-03-10 PROCEDURE — 82728 ASSAY OF FERRITIN: CPT

## 2021-03-10 NOTE — PROGRESS NOTES
Subjective     CHIEF COMPLAINT:      Chief Complaint   Patient presents with   • Follow-up     Breast Cancer 6 month follow up       HISTORY OF PRESENT ILLNESS:     Jaimie Jeffries is a 65 y.o. female patient who returns today for follow up on her right breast cancer.  She is on adjuvant Arimidex.  She did undergo annual mammogram February 2021 which thankfully was benign.  Additionally, she underwent DEXA scan December 2020.  This was reviewed with her OB/GYN who did initiate the patient on Fosamax due to osteopenia.  She reports she is tolerating this well.  Her energy is adequate.  She denies any breast pain.  She denies any new pain.  She does report she is attempting to taper her Effexor, currently taking 37.5 mg.  She has not had worsening hot flashes with the taper of her Effexor.    REVIEW OF SYSTEMS:  Review of Systems   Constitutional: Negative for fatigue, fever and unexpected weight change.        Hot flashes   HENT: Negative for nosebleeds and voice change.    Eyes: Negative for visual disturbance.   Respiratory: Negative for cough and shortness of breath.    Cardiovascular: Negative for chest pain and leg swelling.   Gastrointestinal: Negative for abdominal pain, blood in stool, constipation, diarrhea, nausea and vomiting.   Genitourinary: Negative for frequency and hematuria.   Musculoskeletal: Negative for arthralgias, back pain and joint swelling.   Skin: Negative for rash.   Neurological: Negative for dizziness and headaches.   Hematological: Negative for adenopathy. Does not bruise/bleed easily.   Psychiatric/Behavioral: Negative for dysphoric mood. The patient is not nervous/anxious.        Past Medical History:   Diagnosis Date   • Breast cancer (CMS/HCC) 2017    Right   • Depression    • Hx of radiation therapy    • PONV (postoperative nausea and vomiting)     PREFER SCOPE PATCH,ZOFRAN,PHENERGAN       Past Surgical History:   Procedure Laterality Date   • ANKLE OPEN REDUCTION INTERNAL FIXATION  Right 8/27/2018    Procedure: OPEN REDUCTION INTERNAL FIXATION DISLOCATED RT ANKLE FRACTURE;  Surgeon: Cristian Qureshi MD;  Location: [facility] [place];  Service: Orthopedics   • BREAST BIOPSY Right 2012, 2014    benign   • BREAST BIOPSY Right 2017    malignant   • BREAST LUMPECTOMY     • BREAST LUMPECTOMY WITH SENTINEL NODE BIOPSY Right 2/13/2017    Procedure: BREAST LUMPECTOMY WITH SENTINEL NODE BIOPSY AND NEEDLE LOCALIZATION;  Surgeon: Fred Denny MD;  Location: [facility] [place];  Service:        Cancer-related family history includes Breast cancer (age of onset: 50) in her sister; Breast cancer (age of onset: 70) in her mother; Kidney cancer in her sister; Stomach cancer in her sister.  Social History     Tobacco Use   • Smoking status: Never Smoker   • Smokeless tobacco: Never Used   Substance Use Topics   • Alcohol use: Yes     Alcohol/week: 2.0 standard drinks     Types: 2 Glasses of wine per week     Comment: 1-2 glasses of wine a week       MEDICATIONS:    Current Outpatient Medications:   •  alendronate (FOSAMAX) 70 MG tablet, Take 1 tablet by mouth 1 (One) Time Per Week., Disp: 4 tablet, Rfl: 12  •  amitriptyline (ELAVIL) 10 MG tablet, Take 1 tablet by mouth daily, Disp: 90 tablet, Rfl: 3  •  amitriptyline (ELAVIL) 10 MG tablet, Take 1 tablet by mouth daily, Disp: 90 tablet, Rfl: 3  •  anastrozole (Arimidex) 1 MG tablet, Take 1 tablet by mouth Daily., Disp: 90 tablet, Rfl: 3  •  anastrozole (ARIMIDEX) 1 MG tablet, Take 1 tablet by mouth Daily., Disp: 90 tablet, Rfl: 0  •  b complex-C-folic acid 1 MG capsule, Take 1 capsule by mouth Daily., Disp: , Rfl:   •  calcium carbonate-cholecalciferol 500-400 MG-UNIT tablet tablet, Take 1 tablet by mouth Daily., Disp: , Rfl:   •  ferrous sulfate 325 (65 FE) MG EC tablet, Take 1 tablet by mouth Daily With Breakfast., Disp: 30 tablet, Rfl: 1  •  HYDROcodone-acetaminophen (NORCO) 5-325 MG per tablet, Take 1 tablet by mouth Every 8 (Eight) Hours As Needed for Severe  "Pain ., Disp: 12 tablet, Rfl: 0  •  melatonin 5 MG tablet tablet, Take 5 mg by mouth Every Night., Disp: , Rfl:   •  meloxicam (MOBIC) 15 MG tablet, Take 1 tablet by mouth Daily., Disp: 30 tablet, Rfl: 0  •  Multiple Vitamins-Minerals (MULTIVITAL PO), Take  by mouth., Disp: , Rfl:   •  venlafaxine XR (EFFEXOR-XR) 37.5 MG 24 hr capsule, Take 1 capsule by mouth Daily., Disp: 90 capsule, Rfl: 4  •  venlafaxine XR (EFFEXOR-XR) 75 MG 24 hr capsule, Take 1 Capsule by mouth daily, Disp: 90 capsule, Rfl: 0    ALLERGIES:  No Known Allergies      Objective   VITAL SIGNS:     Vitals:    03/10/21 1605   BP: 134/86   Pulse: 70   Resp: 16   Temp: 97.8 °F (36.6 °C)   TempSrc: Skin   SpO2: 95%   Weight: 82.1 kg (180 lb 14.4 oz)   Height: 167.6 cm (65.98\")   PainSc: 0-No pain     Body mass index is 29.21 kg/m².     Wt Readings from Last 3 Encounters:   03/10/21 82.1 kg (180 lb 14.4 oz)   11/23/20 86.3 kg (190 lb 3.2 oz)   09/10/20 84.7 kg (186 lb 12.8 oz)       PHYSICAL EXAMINATION:  GENERAL:  The patient appears in good general condition, not in acute distress.  SKIN: No skin rashes. No ecchymosis.  HEAD:  Normocephalic.  EYES:  No Jaundice. No Pallor. Pupils equal. EOMI.  NECK:  Supple with Good ROM. No Thyromegaly. No Masses.  LYMPHATICS:  No cervical or supraclavicular or axillary lymphadenopathy.  BREASTS: Right breast exam reveals surgical incision at the upper outer quadrant with underlying scar tissue and some tenderness.  At the 8 o'clock position, there is a hardened area underlying the small incision likely representing prior biopsy.  Bilateral fibrocystic changes, more on the right.  Left breast exam otherwise unremarkable.  Breast not examined today, 3/10/2021, mammogram reviewed  CHEST: Normal respiratory effort. Lungs clear to auscultation.   CARDIAC:  Normal S1 & S2. No murmur. No edema.  ABDOMEN:  Soft. No tenderness. No Hepatomegaly. No Splenomegaly. No masses.  EXTREMITIES:  No clubbing. No deforming arthritis in " the hands. No Calf tenderness.  NEUROLOGICAL:  No Focal neurological deficits.       DIAGNOSTIC DATA:   Results from last 7 days   Lab Units 03/10/21  1620   WBC 10*3/mm3 5.78   NEUTROS ABS 10*3/mm3 3.32   HEMOGLOBIN g/dL 13.2   HEMATOCRIT % 40.2   PLATELETS 10*3/mm3 266                 Assessment/Plan   1.  Right breast invasive ductal carcinoma, grade 2 with associated ductal carcinoma in situ.  · Status post lumpectomy and sentinel lymph node biopsy on 2/13/2017  · Maximum dimension was 4 mm.  · Stage IA (T1a N0 M0).  · ER and PA positive at 90%.  HER-2 negative.  · Adjuvant chemotherapy was not indicated.  · Post lumpectomy radiation therapy was completed on 4/11/2017.  · Adjuvant Arimidex was started on 4/24/2017.  · Mammograms and ultrasound from 11/23/2020 were benign.  · Repeat mammogram 2/22/2021 benign   · Patient is tolerating Arimidex well.    2.  Osteopenia.she is on calcium and vitamin D.    · DEXA scan 12/8/2020 with osteopenia of bilateral hips, when compared to DEXA scan 2018, the patient does have worsening osteopenia with the left hip T score decreased to -2.2, compared to -1.3 in 2018  · Continue calcium and vitamin D supplementation  · She was initiated on Fosamax by her OB/GYN    3.  Hot flashes secondary to Arimidex.  Patient started experiencing the hot flashes after she was started on Arimidex.  She is no longer experiencing them and this is likely secondary to the effect of Effexor.  She is tapering her Effexor, currently taking 37.5 mg    4.  Anemia.    · September 2020, hemoglobin of 11.7 low transferrin saturation of 18%.  B12 and folate are normal.  This is likely dietary.  · Completed ferrous sulfate 325 mg  · Hemoglobin improved today to 13.2    PLAN:    1.  Continue Arimidex.  5 years will be completed in April 2022.  2.  Continue Effexor 37.5 mg daily.  3.  Continue calcium and vitamin D supplementation  4.  Continue Fosamax as prescribed  5.  February 2021 mammogram and December  2020 DEXA scan reviewed with the patient today  6.  MD follow-up with Dr. Tom in 6 months with CBC, CMP      Maritza Álvarez, APRN  03/10/21

## 2021-04-06 RX ORDER — ANASTROZOLE 1 MG/1
TABLET ORAL
Qty: 90 TABLET | Refills: 3 | Status: SHIPPED | OUTPATIENT
Start: 2021-04-06 | End: 2022-08-22

## 2021-04-19 ENCOUNTER — TELEPHONE (OUTPATIENT)
Dept: GASTROENTEROLOGY | Facility: CLINIC | Age: 66
End: 2021-04-19

## 2021-04-19 NOTE — TELEPHONE ENCOUNTER
Last scope 09/14/2009-- no personal hx of polyps-- no family hx of polyps or colon ca-- no ASA or blood thinners-- medications:    amitriptyline (ELAVIL) 10 MG tablet  amitriptyline (ELAVIL) 10 MG tablet  anastrozole (ARIMIDEX) 1 MG tablet  calcium carbonate-cholecalciferol 500-400 MG-UNIT tablet tablet  melatonin 5 MG tablet tablet  Multiple Vitamins-Minerals (MULTIVITAL PO)  venlafaxine XR (EFFEXOR-XR) 37.5 MG 24 hr capsule  venlafaxine XR (EFFEXOR-XR) 75 MG 24 hr capsule  MVI    OA form and last scope scanned into media

## 2021-04-23 ENCOUNTER — PREP FOR SURGERY (OUTPATIENT)
Dept: OTHER | Facility: HOSPITAL | Age: 66
End: 2021-04-23

## 2021-04-23 DIAGNOSIS — Z12.11 SCREEN FOR COLON CANCER: Primary | ICD-10-CM

## 2021-07-08 ENCOUNTER — TELEPHONE (OUTPATIENT)
Dept: GASTROENTEROLOGY | Facility: CLINIC | Age: 66
End: 2021-07-08

## 2021-07-08 PROBLEM — Z12.11 SCREEN FOR COLON CANCER: Status: ACTIVE | Noted: 2021-07-08

## 2021-07-08 NOTE — TELEPHONE ENCOUNTER
spoke with pt scheduled at [facility] on aug 27 arrive at 0830 am kay murcia----arpit

## 2021-08-11 ENCOUNTER — TELEPHONE (OUTPATIENT)
Dept: INTERNAL MEDICINE | Facility: CLINIC | Age: 66
End: 2021-08-11

## 2021-08-11 NOTE — TELEPHONE ENCOUNTER
"LM to return call. We need to reschedule her \"CHECK UP\" appt as an annual preventative exam, she is re-establishing care with Dr. Crawford. Last seen was 2018. Then we can schedule her for labs.  "

## 2021-08-12 NOTE — TELEPHONE ENCOUNTER
THE PATIENT CALLED BACK IN TO CHECK ON HER APPOINTMENT FOR Monday, 08/16/2021. THE PATIENT INQUIRED ABOUT HER LAB APPOINTMENT. THE HUB RELAYED TO THE PATIENT THAT THE PROVIDER WOULD LIKE TO SEE HER IN OFFICE BEFORE SCHEDULING LABS. THE PATIENT WAS ADAMANT THAT SHE WOULD LIKE HER LABS COMPLETE BEFORE HER VISIT. SHE STATES THAT SHE WORKS AT THE [facility] AND CAN HAVE HER LABS TAKEN CARE OF VERY EARLY TOMORROW MORNING. PLEASE ADVISE THE PATIENT ON THIS SITUATION BY CALLING [phone].

## 2021-08-12 NOTE — TELEPHONE ENCOUNTER
8/16 appointment has been correct & ok to stay as scheduled. Patient would have to come in for OV first, before scheduling lab appt. Unsure of what orders would be needed.

## 2021-08-16 ENCOUNTER — OFFICE VISIT (OUTPATIENT)
Dept: INTERNAL MEDICINE | Facility: CLINIC | Age: 66
End: 2021-08-16

## 2021-08-16 VITALS
BODY MASS INDEX: 30.53 KG/M2 | WEIGHT: 190 LBS | SYSTOLIC BLOOD PRESSURE: 122 MMHG | DIASTOLIC BLOOD PRESSURE: 72 MMHG | HEIGHT: 66 IN | OXYGEN SATURATION: 99 % | HEART RATE: 68 BPM | RESPIRATION RATE: 16 BRPM | TEMPERATURE: 96.6 F

## 2021-08-16 DIAGNOSIS — Z00.00 ENCOUNTER FOR PREVENTIVE HEALTH EXAMINATION: Primary | ICD-10-CM

## 2021-08-16 LAB
CHOLEST SERPL-MCNC: 239 MG/DL (ref 0–200)
CHOLEST/HDLC SERPL: 4.05 {RATIO}
CLARITY, POC: CLEAR
COLOR UR: YELLOW
HDLC SERPL-MCNC: 59 MG/DL (ref 40–60)
LDLC SERPL CALC-MCNC: 142 MG/DL (ref 0–100)
Lab: NORMAL
PH UR: 6 [PH] (ref 5–8)
PROT UR STRIP-MCNC: NEGATIVE MG/DL
RBC # UR STRIP: ABNORMAL /UL
SP GR UR: 1.02 (ref 1–1.03)
TRIGL SERPL-MCNC: 213 MG/DL (ref 0–150)
VLDLC SERPL CALC-MCNC: 38 MG/DL (ref 5–40)

## 2021-08-16 PROCEDURE — 99396 PREV VISIT EST AGE 40-64: CPT | Performed by: INTERNAL MEDICINE

## 2021-08-16 PROCEDURE — 81003 URINALYSIS AUTO W/O SCOPE: CPT | Performed by: INTERNAL MEDICINE

## 2021-08-16 NOTE — PATIENT INSTRUCTIONS
Exercising to Stay Healthy  To become healthy and stay healthy, it is recommended that you do moderate-intensity and vigorous-intensity exercise. You can tell that you are exercising at a moderate intensity if your heart starts beating faster and you start breathing faster but can still hold a conversation. You can tell that you are exercising at a vigorous intensity if you are breathing much harder and faster and cannot hold a conversation while exercising.  Exercising regularly is important. It has many health benefits, such as:  · Improving overall fitness, flexibility, and endurance.  · Increasing bone density.  · Helping with weight control.  · Decreasing body fat.  · Increasing muscle strength.  · Reducing stress and tension.  · Improving overall health.  How often should I exercise?  Choose an activity that you enjoy, and set realistic goals. Your health care provider can help you make an activity plan that works for you.  Exercise regularly as told by your health care provider. This may include:  · Doing strength training two times a week, such as:  ? Lifting weights.  ? Using resistance bands.  ? Push-ups.  ? Sit-ups.  ? Yoga.  · Doing a certain intensity of exercise for a given amount of time. Choose from these options:  ? A total of 150 minutes of moderate-intensity exercise every week.  ? A total of 75 minutes of vigorous-intensity exercise every week.  ? A mix of moderate-intensity and vigorous-intensity exercise every week.  Children, pregnant women, people who have not exercised regularly, people who are overweight, and older adults may need to talk with a health care provider about what activities are safe to do. If you have a medical condition, be sure to talk with your health care provider before you start a new exercise program.  What are some exercise ideas?  Moderate-intensity exercise ideas include:  · Walking 1 mile (1.6 km) in about 15  minutes.  · Biking.  · Hiking.  · Golfing.  · Dancing.  · Water aerobics.  Vigorous-intensity exercise ideas include:  · Walking 4.5 miles (7.2 km) or more in about 1 hour.  · Jogging or running 5 miles (8 km) in about 1 hour.  · Biking 10 miles (16.1 km) or more in about 1 hour.  · Lap swimming.  · Roller-skating or in-line skating.  · Cross-country skiing.  · Vigorous competitive sports, such as football, basketball, and soccer.  · Jumping rope.  · Aerobic dancing.  What are some everyday activities that can help me to get exercise?  · Yard work, such as:  ? Pushing a .  ? Raking and bagging leaves.  · Washing your car.  · Pushing a stroller.  · Shoveling snow.  · Gardening.  · Washing windows or floors.  How can I be more active in my day-to-day activities?  · Use stairs instead of an elevator.  · Take a walk during your lunch break.  · If you drive, park your car farther away from your work or school.  · If you take public transportation, get off one stop early and walk the rest of the way.  · Stand up or walk around during all of your indoor phone calls.  · Get up, stretch, and walk around every 30 minutes throughout the day.  · Enjoy exercise with a friend. Support to continue exercising will help you keep a regular routine of activity.  What guidelines can I follow while exercising?  · Before you start a new exercise program, talk with your health care provider.  · Do not exercise so much that you hurt yourself, feel dizzy, or get very short of breath.  · Wear comfortable clothes and wear shoes with good support.  · Drink plenty of water while you exercise to prevent dehydration or heat stroke.  · Work out until your breathing and your heartbeat get faster.  Where to find more information  · [place] Department of Health and Human Services: www.hhs.gov  · Centers for Disease Control and Prevention (CDC): www.cdc.gov  Summary  · Exercising regularly is important. It will improve your overall fitness,  flexibility, and endurance.  · Regular exercise also will improve your overall health. It can help you control your weight, reduce stress, and improve your bone density.  · Do not exercise so much that you hurt yourself, feel dizzy, or get very short of breath.  · Before you start a new exercise program, talk with your health care provider.  This information is not intended to replace advice given to you by your health care provider. Make sure you discuss any questions you have with your health care provider.  Document Revised: 11/30/2018 Document Reviewed: 11/08/2018  [employer]vier Patient Education © 2021 [employer]  Calorie Counting for Weight Loss  Calories are units of energy. Your body needs a certain number of calories from food to keep going throughout the day. When you eat or drink more calories than your body needs, your body stores the extra calories mostly as fat. When you eat or drink fewer calories than your body needs, your body burns fat to get the energy it needs.  Calorie counting means keeping track of how many calories you eat and drink each day. Calorie counting can be helpful if you need to lose weight. If you eat fewer calories than your body needs, you should lose weight. Ask your health care provider what a healthy weight is for you.  For calorie counting to work, you will need to eat the right number of calories each day to lose a healthy amount of weight per week. A dietitian can help you figure out how many calories you need in a day and will suggest ways to reach your calorie goal.  · A healthy amount of weight to lose each week is usually 1-2 lb (0.5-0.9 kg). This usually means that your daily calorie intake should be reduced by 500-750 calories.  · Eating 1,200-1,500 calories a day can help most women lose weight.  · Eating 1,500-1,800 calories a day can help most men lose weight.  What do I need to know about calorie counting?  Work with your health care provider or dietitian to  determine how many calories you should get each day. To meet your daily calorie goal, you will need to:  · Find out how many calories are in each food that you would like to eat. Try to do this before you eat.  · Decide how much of the food you plan to eat.  · Keep a food log. Do this by writing down what you ate and how many calories it had.  To successfully lose weight, it is important to balance calorie counting with a healthy lifestyle that includes regular activity.  Where do I find calorie information?    The number of calories in a food can be found on a Nutrition Facts label. If a food does not have a Nutrition Facts label, try to look up the calories online or ask your dietitian for help.  Remember that calories are listed per serving. If you choose to have more than one serving of a food, you will have to multiply the calories per serving by the number of servings you plan to eat. For example, the label on a package of bread might say that a serving size is 1 slice and that there are 90 calories in a serving. If you eat 1 slice, you will have eaten 90 calories. If you eat 2 slices, you will have eaten 180 calories.  How do I keep a food log?  After each time that you eat, record the following in your food log as soon as possible:  · What you ate. Be sure to include toppings, sauces, and other extras on the food.  · How much you ate. This can be measured in cups, ounces, or number of items.  · How many calories were in each food and drink.  · The total number of calories in the food you ate.  Keep your food log near you, such as in a pocket-sized notebook or on an phillip or website on your mobile phone. Some programs will calculate calories for you and show you how many calories you have left to meet your daily goal.  What are some portion-control tips?  · Know how many calories are in a serving. This will help you know how many servings you can have of a certain food.  · Use a measuring cup to measure serving  sizes. You could also try weighing out portions on a kitchen scale. With time, you will be able to estimate serving sizes for some foods.  · Take time to put servings of different foods on your favorite plates or in your favorite bowls and cups so you know what a serving looks like.  · Try not to eat straight from a food's packaging, such as from a bag or box. Eating straight from the package makes it hard to see how much you are eating and can lead to overeating. Put the amount you would like to eat in a cup or on a plate to make sure you are eating the right portion.  · Use smaller plates, glasses, and bowls for smaller portions and to prevent overeating.  · Try not to multitask. For example, avoid watching TV or using your computer while eating. If it is time to eat, sit down at a table and enjoy your food. This will help you recognize when you are full. It will also help you be more mindful of what and how much you are eating.  What are tips for following this plan?  Reading food labels  · Check the calorie count compared with the serving size. The serving size may be smaller than what you are used to eating.  · Check the source of the calories. Try to choose foods that are high in protein, fiber, and vitamins, and low in saturated fat, trans fat, and sodium.  Shopping  · Read nutrition labels while you shop. This will help you make healthy decisions about which foods to buy.  · Pay attention to nutrition labels for low-fat or fat-free foods. These foods sometimes have the same number of calories or more calories than the full-fat versions. They also often have added sugar, starch, or salt to make up for flavor that was removed with the fat.  · Make a grocery list of lower-calorie foods and stick to it.  Cooking  · Try to cook your favorite foods in a healthier way. For example, try baking instead of frying.  · Use low-fat dairy products.  Meal planning  · Use more fruits and vegetables. One-half of your plate  should be fruits and vegetables.  · Include lean proteins, such as chicken, turkey, and fish.  Lifestyle  Each week, aim to do one of the following:  · 150 minutes of moderate exercise, such as walking.  · 75 minutes of vigorous exercise, such as running.  General information  · Know how many calories are in the foods you eat most often. This will help you calculate calorie counts faster.  · Find a way of tracking calories that works for you. Get creative. Try different apps or programs if writing down calories does not work for you.  What foods should I eat?    · Eat nutritious foods. It is better to have a nutritious, high-calorie food, such as an avocado, than a food with few nutrients, such as a bag of potato chips.  · Use your calories on foods and drinks that will fill you up and will not leave you hungry soon after eating.  ? Examples of foods that fill you up are nuts and nut butters, vegetables, lean proteins, and high-fiber foods such as whole grains. High-fiber foods are foods with more than 5 g of fiber per serving.  · Pay attention to calories in drinks. Low-calorie drinks include water and unsweetened drinks.  The items listed above may not be a complete list of foods and beverages you can eat. Contact a dietitian for more information.  What foods should I limit?  Limit foods or drinks that are not good sources of vitamins, minerals, or protein or that are high in unhealthy fats. These include:  · Candy.  · Other sweets.  · Sodas, specialty coffee drinks, alcohol, and juice.  The items listed above may not be a complete list of foods and beverages you should avoid. Contact a dietitian for more information.  How do I count calories when eating out?  · Pay attention to portions. Often, portions are much larger when eating out. Try these tips to keep portions smaller:  ? Consider sharing a meal instead of getting your own.  ? If you get your own meal, eat only half of it. Before you start eating, ask for  a container and put half of your meal into it.  ? When available, consider ordering smaller portions from the menu instead of full portions.  · Pay attention to your food and drink choices. Knowing the way food is cooked and what is included with the meal can help you eat fewer calories.  ? If calories are listed on the menu, choose the lower-calorie options.  ? Choose dishes that include vegetables, fruits, whole grains, low-fat dairy products, and lean proteins.  ? Choose items that are boiled, broiled, grilled, or steamed. Avoid items that are buttered, battered, fried, or served with cream sauce. Items labeled as crispy are usually fried, unless stated otherwise.  ? Choose water, low-fat milk, unsweetened iced tea, or other drinks without added sugar. If you want an alcoholic beverage, choose a lower-calorie option, such as a glass of wine or light beer.  ? Ask for dressings, sauces, and syrups on the side. These are usually high in calories, so you should limit the amount you eat.  ? If you want a salad, choose a garden salad and ask for grilled meats. Avoid extra toppings such as collier, cheese, or fried items. Ask for the dressing on the side, or ask for olive oil and vinegar or lemon to use as dressing.  · Estimate how many servings of a food you are given. Knowing serving sizes will help you be aware of how much food you are eating at restaurants.  Where to find more information  · Centers for Disease Control and Prevention: www.cdc.gov  · [place] Department of Agriculture: myplate.gov  Summary  · Calorie counting means keeping track of how many calories you eat and drink each day. If you eat fewer calories than your body needs, you should lose weight.  · A healthy amount of weight to lose per week is usually 1-2 lb (0.5-0.9 kg). This usually means reducing your daily calorie intake by 500-750 calories.  · The number of calories in a food can be found on a Nutrition Facts label. If a food does not have a  Nutrition Facts label, try to look up the calories online or ask your dietitian for help.  · Use smaller plates, glasses, and bowls for smaller portions and to prevent overeating.  · Use your calories on foods and drinks that will fill you up and not leave you hungry shortly after a meal.  This information is not intended to replace advice given to you by your health care provider. Make sure you discuss any questions you have with your health care provider.  Document Revised: 01/28/2021 Document Reviewed: 01/28/2021  [employer] Patient Education © 2021 [employer]  BMI for Adults  What is BMI?  Body mass index (BMI) is a number that is calculated from a person's weight and height. BMI can help estimate how much of a person's weight is composed of fat. BMI does not measure body fat directly. Rather, it is an alternative to procedures that directly measure body fat, which can be difficult and expensive.  BMI can help identify people who may be at higher risk for certain medical problems.  What are BMI measurements used for?  BMI is used as a screening tool to identify possible weight problems. It helps determine whether a person is obese, overweight, a healthy weight, or underweight.  BMI is useful for:  · Identifying a weight problem that may be related to a medical condition or may increase the risk for medical problems.  · Promoting changes, such as changes in diet and exercise, to help reach a healthy weight. BMI screening can be repeated to see if these changes are working.  How is BMI calculated?  BMI involves measuring your weight in relation to your height. Both height and weight are measured, and the BMI is calculated from those numbers. This can be done either in English ([place] or metric measurements. Note that charts and online BMI calculators are available to help you find your BMI quickly and easily without having to do these calculations yourself.  To calculate your BMI in English ([place]  "measurements:    1. Measure your weight in pounds (lb).  2. Multiply the number of pounds by 703.  ? For example, for a person who weighs 180 lb, multiply that number by 703, which equals 126,540.  3. Measure your height in inches. Then multiply that number by itself to get a measurement called \"inches squared.\"  ? For example, for a person who is 70 inches tall, the \"inches squared\" measurement is 70 inches x 70 inches, which equals 4,900 inches squared.  4. Divide the total from step 2 (number of lb x 703) by the total from step 3 (inches squared): 126,540 ÷ 4,900 = 25.8. This is your BMI.  To calculate your BMI in metric measurements:  1. Measure your weight in kilograms (kg).  2. Measure your height in meters (m). Then multiply that number by itself to get a measurement called \"meters squared.\"  ? For example, for a person who is 1.75 m tall, the \"meters squared\" measurement is 1.75 m x 1.75 m, which is equal to 3.1 meters squared.  3. Divide the number of kilograms (your weight) by the meters squared number. In this example: 70 ÷ 3.1 = 22.6. This is your BMI.  What do the results mean?  BMI charts are used to identify whether you are underweight, normal weight, overweight, or obese. The following guidelines will be used:  · Underweight: BMI less than 18.5.  · Normal weight: BMI between 18.5 and 24.9.  · Overweight: BMI between 25 and 29.9.  · Obese: BMI of 30 or above.  Keep these notes in mind:  · Weight includes both fat and muscle, so someone with a muscular build, such as an athlete, may have a BMI that is higher than 24.9. In cases like these, BMI is not an accurate measure of body fat.  · To determine if excess body fat is the cause of a BMI of 25 or higher, further assessments may need to be done by a health care provider.  · BMI is usually interpreted in the same way for men and women.  Where to find more information  For more information about BMI, including tools to quickly calculate your BMI, go to " these websites:  · Centers for Disease Control and Prevention: www.cdc.gov  · American Heart Association: www.heart.org  · National Heart, Lung, and [facility] [place]: www.nhlbi.nih.gov  Summary  · Body mass index (BMI) is a number that is calculated from a person's weight and height.  · BMI may help estimate how much of a person's weight is composed of fat. BMI can help identify those who may be at higher risk for certain medical problems.  · BMI can be measured using English measurements or metric measurements.  · BMI charts are used to identify whether you are underweight, normal weight, overweight, or obese.  This information is not intended to replace advice given to you by your health care provider. Make sure you discuss any questions you have with your health care provider.  Document Revised: 09/09/2020 Document Reviewed: 07/17/2020  [employer] Patient Education © 2021 [employer]

## 2021-08-16 NOTE — PROGRESS NOTES
Subjective   Jaimie Jeffries is a 65 y.o. female.     Chief Complaint   Patient presents with   • Annual Exam     Re-Establishing patient   • Hip Pain     Rt hip x 1 wk         In for annual preventive exam.  Sleep is good.  Gets 8 to 10 hours per night.  No formal exercise but walks all day at work.  Energy is good.  Diet is well-balanced.    Hip Pain   The incident occurred more than 1 week ago. The incident occurred at home. The injury mechanism was a fall. The pain is present in the right hip. The pain is moderate. Pertinent negatives include no inability to bear weight.        The following portions of the patient's history were reviewed and updated as appropriate: allergies, current medications, past social history and problem list.    Outpatient Medications Marked as Taking for the 8/16/21 encounter (Office Visit) with Rj Crawford MD   Medication Sig Dispense Refill   • alendronate (FOSAMAX) 70 MG tablet Take 1 tablet by mouth 1 (One) Time Per Week. 4 tablet 12   • amitriptyline (ELAVIL) 10 MG tablet Take 1 tablet by mouth daily 90 tablet 3   • anastrozole (ARIMIDEX) 1 MG tablet Take 1 tablet by mouth Daily. 90 tablet 3   • calcium carbonate-cholecalciferol 500-400 MG-UNIT tablet tablet Take 1 tablet by mouth Daily.     • Melatonin 10 MG tablet Take 10 mg by mouth Every Night.     • Multiple Vitamins-Minerals (MULTIVITAL PO) Take 1 tablet by mouth Daily.     • venlafaxine XR (EFFEXOR-XR) 37.5 MG 24 hr capsule Take 1 capsule by mouth Daily. 90 capsule 4   • [DISCONTINUED] b complex-C-folic acid 1 MG capsule Take 1 capsule by mouth Daily.     • [DISCONTINUED] ferrous sulfate 325 (65 FE) MG EC tablet Take 1 tablet by mouth Daily With Breakfast. 30 tablet 1   • [DISCONTINUED] meloxicam (MOBIC) 15 MG tablet Take 1 tablet by mouth Daily. 30 tablet 0       Review of Systems   Constitutional: Negative for appetite change, chills, diaphoresis, fatigue, fever and unexpected weight change.   Respiratory: Negative  for cough, chest tightness, shortness of breath and wheezing.    Cardiovascular: Negative for chest pain, palpitations and leg swelling.   Gastrointestinal: Negative for abdominal pain, anal bleeding, constipation, diarrhea, nausea, rectal pain and vomiting.   Genitourinary: Negative for difficulty urinating, dysuria, flank pain, frequency, hematuria and urgency.   Musculoskeletal: Positive for arthralgias. Negative for back pain and myalgias.   Allergic/Immunologic: Negative for environmental allergies.   Neurological: Negative for dizziness, syncope, speech difficulty, weakness, light-headedness and headaches.   Hematological: Does not bruise/bleed easily.   Psychiatric/Behavioral: Negative for agitation, confusion, dysphoric mood and sleep disturbance. The patient is not nervous/anxious.        Objective   Vitals:    08/16/21 1509   BP: 122/72   Pulse: 68   Resp: 16   Temp: 96.6 °F (35.9 °C)   SpO2: 99%          08/16/21  1509   Weight: 86.2 kg (190 lb)    [unfilled]  Body mass index is 30.68 kg/m².      Physical Exam   Constitutional: She is oriented to person, place, and time. She appears well-developed.   HENT:   Head: Normocephalic and atraumatic.   Right Ear: External ear normal.   Left Ear: External ear normal.   Nose: Nose normal.   Eyes: Pupils are equal, round, and reactive to light. Conjunctivae are normal.   Neck: No JVD present. No thyromegaly present.   Cardiovascular: Normal rate, regular rhythm and normal heart sounds. Exam reveals no gallop.   No murmur heard.  Pulmonary/Chest: Effort normal and breath sounds normal. No respiratory distress. She has no wheezes. She has no rales.   Abdominal: Soft. Normal appearance and bowel sounds are normal. She exhibits no distension and no mass. There is no abdominal tenderness. There is no guarding. No hernia.   Musculoskeletal: Normal range of motion.   Lymphadenopathy:     She has no cervical adenopathy.   Neurological: She is alert and oriented to  person, place, and time. She displays normal reflexes. No cranial nerve deficit. Coordination normal.   Skin: Skin is warm and dry.   Psychiatric: Her behavior is normal. Mood, judgment and thought content normal.   Nursing note and vitals reviewed.        Problems Addressed this Visit     None      Visit Diagnoses     Encounter for preventive health examination    -  Primary    Relevant Orders    POCT urinalysis dipstick, automated (Completed)      Diagnoses       Codes Comments    Encounter for preventive health examination    -  Primary ICD-10-CM: Z00.00  ICD-9-CM: V70.0         Assessment/Plan   In for annual preventative exam.  Overall health is excellent.  She has breast cancer and remains on arimadex for hormone blockade.  Surgery was February 2017.  Stage IA disease.  She's due for a lipid profile today.  Rest of her lab work is up-to-date.  She has scheduled a routine colonoscopy in 10 days.  1 week ago she fell going down some concrete steps to her house and landed on her outstretched hands and knees.  Since then she has been having right hip pain.  It seems to be worse with stair climbing.  The exam on her hip is perfectly normal.  There is no tenderness.  Perfect range of motion.  Likely a strain.    Prevention counseling was performed today. The counseling performed was routine health maintenance topics.    The above information was reviewed again today 08/16/21.  It continues to be accurate as reflected above and is unchanged.  History, physical and review of systems all reviewed and are unchanged.  Medications were reviewed today and continue the current dosing.    PPE today includes face mask and eye shield.         Dragon disclaimer:   Much of this encounter note is an electronic transcription/translation of spoken language to printed text. The electronic translation of spoken language may permit erroneous, or at times, nonsensical words or phrases to be inadvertently transcribed; Although I have  reviewed the note for such errors, some may still exist.

## 2021-08-27 ENCOUNTER — HOSPITAL ENCOUNTER (OUTPATIENT)
Facility: HOSPITAL | Age: 66
Setting detail: HOSPITAL OUTPATIENT SURGERY
Discharge: HOME | End: 2021-08-27
Attending: INTERNAL MEDICINE | Admitting: INTERNAL MEDICINE

## 2021-08-27 ENCOUNTER — ANESTHESIA EVENT (OUTPATIENT)
Dept: GASTROENTEROLOGY | Facility: HOSPITAL | Age: 66
End: 2021-08-27

## 2021-08-27 ENCOUNTER — ANESTHESIA (OUTPATIENT)
Dept: GASTROENTEROLOGY | Facility: HOSPITAL | Age: 66
End: 2021-08-27

## 2021-08-27 VITALS
HEIGHT: 67 IN | DIASTOLIC BLOOD PRESSURE: 85 MMHG | HEART RATE: 66 BPM | SYSTOLIC BLOOD PRESSURE: 132 MMHG | RESPIRATION RATE: 16 BRPM | BODY MASS INDEX: 27.99 KG/M2 | WEIGHT: 178.3 LBS | OXYGEN SATURATION: 95 %

## 2021-08-27 PROCEDURE — 45378 DIAGNOSTIC COLONOSCOPY: CPT | Performed by: INTERNAL MEDICINE

## 2021-08-27 PROCEDURE — S0260 H&P FOR SURGERY: HCPCS | Performed by: INTERNAL MEDICINE

## 2021-08-27 PROCEDURE — 25010000002 PROPOFOL 10 MG/ML EMULSION: Performed by: NURSE ANESTHETIST, CERTIFIED REGISTERED

## 2021-08-27 RX ORDER — LIDOCAINE HYDROCHLORIDE 20 MG/ML
INJECTION, SOLUTION INFILTRATION; PERINEURAL AS NEEDED
Status: DISCONTINUED | OUTPATIENT
Start: 2021-08-27 | End: 2021-08-27 | Stop reason: SURG

## 2021-08-27 RX ORDER — PROPOFOL 10 MG/ML
VIAL (ML) INTRAVENOUS CONTINUOUS PRN
Status: DISCONTINUED | OUTPATIENT
Start: 2021-08-27 | End: 2021-08-27 | Stop reason: SURG

## 2021-08-27 RX ORDER — PROPOFOL 10 MG/ML
VIAL (ML) INTRAVENOUS AS NEEDED
Status: DISCONTINUED | OUTPATIENT
Start: 2021-08-27 | End: 2021-08-27 | Stop reason: SURG

## 2021-08-27 RX ORDER — SODIUM CHLORIDE, SODIUM LACTATE, POTASSIUM CHLORIDE, CALCIUM CHLORIDE 600; 310; 30; 20 MG/100ML; MG/100ML; MG/100ML; MG/100ML
30 INJECTION, SOLUTION INTRAVENOUS CONTINUOUS PRN
Status: DISCONTINUED | OUTPATIENT
Start: 2021-08-27 | End: 2021-08-27 | Stop reason: HOSPADM

## 2021-08-27 RX ADMIN — Medication 250 MCG/KG/MIN: at 09:18

## 2021-08-27 RX ADMIN — PROPOFOL 100 MG: 10 INJECTION, EMULSION INTRAVENOUS at 09:18

## 2021-08-27 RX ADMIN — SODIUM CHLORIDE, POTASSIUM CHLORIDE, SODIUM LACTATE AND CALCIUM CHLORIDE 30 ML/HR: 600; 310; 30; 20 INJECTION, SOLUTION INTRAVENOUS at 08:58

## 2021-08-27 RX ADMIN — LIDOCAINE HYDROCHLORIDE 40 MG: 20 INJECTION, SOLUTION INFILTRATION; PERINEURAL at 09:18

## 2021-08-27 NOTE — ANESTHESIA PREPROCEDURE EVALUATION
Anesthesia Evaluation     Patient summary reviewed and Nursing notes reviewed   history of anesthetic complications: PONV               Airway   Mallampati: II  TM distance: >3 FB  Neck ROM: full  No difficulty expected  Dental - normal exam     Pulmonary - normal exam   (+) sleep apnea on CPAP,   Cardiovascular - normal exam        Neuro/Psych  (+) psychiatric history,       ROS Comment: PLMD  GI/Hepatic/Renal/Endo    (+) obesity,       Musculoskeletal     Abdominal   (+) obese,    Substance History      OB/GYN          Other      history of cancer      Other Comment: Hx breast CA                  Anesthesia Plan    ASA 2     MAC   (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    Plan discussed with CRNA.

## 2021-09-16 ENCOUNTER — OFFICE VISIT (OUTPATIENT)
Dept: ONCOLOGY | Facility: CLINIC | Age: 66
End: 2021-09-16

## 2021-09-16 ENCOUNTER — LAB (OUTPATIENT)
Dept: LAB | Facility: HOSPITAL | Age: 66
End: 2021-09-16

## 2021-09-16 VITALS
RESPIRATION RATE: 16 BRPM | HEART RATE: 71 BPM | WEIGHT: 182.1 LBS | DIASTOLIC BLOOD PRESSURE: 81 MMHG | HEIGHT: 67 IN | TEMPERATURE: 97.1 F | BODY MASS INDEX: 28.58 KG/M2 | SYSTOLIC BLOOD PRESSURE: 120 MMHG | OXYGEN SATURATION: 94 %

## 2021-09-16 DIAGNOSIS — C50.911 INFILTRATING DUCTAL CARCINOMA OF RIGHT BREAST (HCC): Primary | ICD-10-CM

## 2021-09-16 DIAGNOSIS — Z12.31 ENCOUNTER FOR SCREENING MAMMOGRAM FOR BREAST CANCER: ICD-10-CM

## 2021-09-16 DIAGNOSIS — R23.2 HOT FLASHES: ICD-10-CM

## 2021-09-16 DIAGNOSIS — D50.9 IRON DEFICIENCY ANEMIA, UNSPECIFIED IRON DEFICIENCY ANEMIA TYPE: ICD-10-CM

## 2021-09-16 DIAGNOSIS — M85.80 OSTEOPENIA, UNSPECIFIED LOCATION: ICD-10-CM

## 2021-09-16 LAB
ALBUMIN SERPL-MCNC: 4.5 G/DL (ref 3.5–5.2)
ALBUMIN/GLOB SERPL: 1.8 G/DL (ref 1.1–2.4)
ALP SERPL-CCNC: 74 U/L (ref 38–116)
ALT SERPL W P-5'-P-CCNC: 18 U/L (ref 0–33)
ANION GAP SERPL CALCULATED.3IONS-SCNC: 10.4 MMOL/L (ref 5–15)
AST SERPL-CCNC: 21 U/L (ref 0–32)
BASOPHILS # BLD AUTO: 0.04 10*3/MM3 (ref 0–0.2)
BASOPHILS NFR BLD AUTO: 0.8 % (ref 0–1.5)
BILIRUB SERPL-MCNC: 0.2 MG/DL (ref 0.2–1.2)
BUN SERPL-MCNC: 18 MG/DL (ref 6–20)
BUN/CREAT SERPL: 25.7 (ref 7.3–30)
CALCIUM SPEC-SCNC: 9.6 MG/DL (ref 8.5–10.2)
CHLORIDE SERPL-SCNC: 103 MMOL/L (ref 98–107)
CO2 SERPL-SCNC: 26.6 MMOL/L (ref 22–29)
CREAT SERPL-MCNC: 0.7 MG/DL (ref 0.6–1.1)
DEPRECATED RDW RBC AUTO: 42.9 FL (ref 37–54)
EOSINOPHIL # BLD AUTO: 0.1 10*3/MM3 (ref 0–0.4)
EOSINOPHIL NFR BLD AUTO: 2 % (ref 0.3–6.2)
ERYTHROCYTE [DISTWIDTH] IN BLOOD BY AUTOMATED COUNT: 12.1 % (ref 12.3–15.4)
FERRITIN SERPL-MCNC: 191.8 NG/ML (ref 13–150)
GFR SERPL CREATININE-BSD FRML MDRD: 84 ML/MIN/1.73
GLOBULIN UR ELPH-MCNC: 2.5 GM/DL (ref 1.8–3.5)
GLUCOSE SERPL-MCNC: 93 MG/DL (ref 74–124)
HCT VFR BLD AUTO: 39.6 % (ref 34–46.6)
HGB BLD-MCNC: 12.7 G/DL (ref 12–15.9)
IMM GRANULOCYTES # BLD AUTO: 0.02 10*3/MM3 (ref 0–0.05)
IMM GRANULOCYTES NFR BLD AUTO: 0.4 % (ref 0–0.5)
IRON 24H UR-MRATE: 62 MCG/DL (ref 37–145)
IRON SATN MFR SERPL: 20 % (ref 14–48)
LYMPHOCYTES # BLD AUTO: 2.04 10*3/MM3 (ref 0.7–3.1)
LYMPHOCYTES NFR BLD AUTO: 40.4 % (ref 19.6–45.3)
MCH RBC QN AUTO: 31.1 PG (ref 26.6–33)
MCHC RBC AUTO-ENTMCNC: 32.1 G/DL (ref 31.5–35.7)
MCV RBC AUTO: 96.8 FL (ref 79–97)
MONOCYTES # BLD AUTO: 0.41 10*3/MM3 (ref 0.1–0.9)
MONOCYTES NFR BLD AUTO: 8.1 % (ref 5–12)
NEUTROPHILS NFR BLD AUTO: 2.44 10*3/MM3 (ref 1.7–7)
NEUTROPHILS NFR BLD AUTO: 48.3 % (ref 42.7–76)
NRBC BLD AUTO-RTO: 0 /100 WBC (ref 0–0.2)
PLATELET # BLD AUTO: 269 10*3/MM3 (ref 140–450)
PMV BLD AUTO: 8.5 FL (ref 6–12)
POTASSIUM SERPL-SCNC: 4.2 MMOL/L (ref 3.5–4.7)
PROT SERPL-MCNC: 7 G/DL (ref 6.3–8)
RBC # BLD AUTO: 4.09 10*6/MM3 (ref 3.77–5.28)
SODIUM SERPL-SCNC: 140 MMOL/L (ref 134–145)
TIBC SERPL-MCNC: 312 MCG/DL (ref 249–505)
TRANSFERRIN SERPL-MCNC: 223 MG/DL (ref 200–360)
WBC # BLD AUTO: 5.05 10*3/MM3 (ref 3.4–10.8)

## 2021-09-16 PROCEDURE — 84466 ASSAY OF TRANSFERRIN: CPT

## 2021-09-16 PROCEDURE — 85025 COMPLETE CBC W/AUTO DIFF WBC: CPT

## 2021-09-16 PROCEDURE — 36415 COLL VENOUS BLD VENIPUNCTURE: CPT

## 2021-09-16 PROCEDURE — 80053 COMPREHEN METABOLIC PANEL: CPT

## 2021-09-16 PROCEDURE — 99214 OFFICE O/P EST MOD 30 MIN: CPT | Performed by: INTERNAL MEDICINE

## 2021-09-16 PROCEDURE — 82728 ASSAY OF FERRITIN: CPT

## 2021-09-16 PROCEDURE — 83540 ASSAY OF IRON: CPT

## 2021-09-16 NOTE — PROGRESS NOTES
"Subjective     CHIEF COMPLAINT:      Chief Complaint   Patient presents with   • Annual Exam     discuss Arimidex       HISTORY OF PRESENT ILLNESS:     Jaimie Jeffries is a 65 y.o. female patient who returns today for follow up on her right breast cancer.  She returns today for follow-up and reports intermittent pain in the right breast.  She is not feeling any lumps.    Patient reports improvement in the hot flashes.  She reduce the dose of Effexor to 37.5 mg daily and did not notice worsening of her hot flashes.  She is considering reducing the dose further.    Patient was found to have osteopenia on the bone density on 12/18/2020.  She was started on Fosamax 70 mg weekly.  She is tolerating it well.  No reflux symptoms or dysphagia.    ROS:  Pertinent ROS is in the HPI.     Past medical, surgical, social and family history were reviewed.     MEDICATIONS:    Current Outpatient Medications:   •  alendronate (FOSAMAX) 70 MG tablet, Take 1 tablet by mouth 1 (One) Time Per Week., Disp: 4 tablet, Rfl: 12  •  amitriptyline (ELAVIL) 10 MG tablet, Take 1 tablet by mouth daily, Disp: 90 tablet, Rfl: 3  •  anastrozole (ARIMIDEX) 1 MG tablet, Take 1 tablet by mouth Daily., Disp: 90 tablet, Rfl: 3  •  calcium carbonate-cholecalciferol 500-400 MG-UNIT tablet tablet, Take 1 tablet by mouth Daily., Disp: , Rfl:   •  Melatonin 10 MG tablet, Take 10 mg by mouth Every Night., Disp: , Rfl:   •  Multiple Vitamins-Minerals (MULTIVITAL PO), Take 1 tablet by mouth Daily., Disp: , Rfl:   •  venlafaxine XR (EFFEXOR-XR) 37.5 MG 24 hr capsule, Take 1 capsule by mouth Daily., Disp: 90 capsule, Rfl: 4    Objective   VITAL SIGNS:     Vitals:    09/16/21 1623   BP: 120/81   Pulse: 71   Resp: 16   Temp: 97.1 °F (36.2 °C)   TempSrc: Temporal   SpO2: 94%   Weight: 82.6 kg (182 lb 1.6 oz)   Height: 171 cm (67.32\")  Comment: new ht   PainSc: 0-No pain     Body mass index is 28.25 kg/m².     Wt Readings from Last 5 Encounters:   09/16/21 82.6 kg " (182 lb 1.6 oz)   08/27/21 80.9 kg (178 lb 4.8 oz)   08/16/21 86.2 kg (190 lb)   03/10/21 82.1 kg (180 lb 14.4 oz)   11/23/20 86.3 kg (190 lb 3.2 oz)       PHYSICAL EXAMINATION:   GENERAL: The patient appears in good general condition, not in acute distress.   SKIN: No ecchymosis.  EYES: No jaundice.  LYMPHATICS: No cervical, supraclavicular or axillary lymphadenopathy.  BREASTS: Right breast exam revealed tender scar tissue at 8 and 9 o'clock position.  No overlying skin changes.  No suspicious masses.  Left breast exam was unremarkable.  CHEST: Normal respiratory effort.  Lungs clear bilaterally.  No added sounds.  CVS: Normal S1-S2.  No murmurs.   ABDOMEN: Soft. No tenderness. No Hepatomegaly. No Splenomegaly. No masses.  .     DIAGNOSTIC DATA:     Results from last 7 days   Lab Units 09/16/21  1612   WBC 10*3/mm3 5.05   NEUTROS ABS 10*3/mm3 2.44   HEMOGLOBIN g/dL 12.7   HEMATOCRIT % 39.6   PLATELETS 10*3/mm3 269     Results from last 7 days   Lab Units 09/16/21  1612   SODIUM mmol/L 140   POTASSIUM mmol/L 4.2   CHLORIDE mmol/L 103   CO2 mmol/L 26.6   BUN mg/dL 18   CREATININE mg/dL 0.70   CALCIUM mg/dL 9.6   ALBUMIN g/dL 4.50   BILIRUBIN mg/dL 0.2   ALK PHOS U/L 74   ALT (SGPT) U/L 18   AST (SGOT) U/L 21   GLUCOSE mg/dL 93         Lab 09/16/21  1612   IRON 62   IRON SATURATION 20   TIBC 312   TRANSFERRIN 223   FERRITIN 191.80*      Bone density on 12/18/2020:  · Osteopenia in both hips.  T score was -2.2 in the left hip and -1.6 in the right hip.  Lumbar spine was -0.1.    Mammogram on 2/22/2021:  No evidence for malignancy or significant change.  It was BI-RADS Category 2: Benign.      Assessment/Plan   *Right breast invasive ductal carcinoma, grade 2 with associated ductal carcinoma in situ.  · Status post lumpectomy and sentinel lymph node biopsy on 2/13/2017  · Maximum dimension was 4 mm.  · Stage IA (T1a N0 M0).  · ER and SD positive at 90%.  HER-2 negative.  · Adjuvant chemotherapy was not  indicated.  · Post lumpectomy radiation therapy was completed on 4/11/2017.  · Adjuvant Arimidex was started on 4/24/2017.  · Patient reported pain in the right side of the breast.  She had diagnostic right breast mammogram ultrasound that showed changes of fat necrosis.  · The area continues to be intermittently painful.  · Mammogram on 2/22/2021 was benign.  · Patient will complete 5 years of adjuvant hormone therapy in late April 2022.  · Due to the small size of the tumor, adjuvant hormone therapy beyond 5 years was not recommended.    *Osteopenia.   · She is on calcium and vitamin D.  · Bone density on 12/18/2020 revealed osteopenia of both hips. Lumbar spine T score was - 0.1.    · Patient was placed by her gynecologist on Fosamax.  She is tolerating it.    *Hot flashes secondary to Arimidex.  Patient started experiencing the hot flashes after she was started on Arimidex.  Hot flashes improved and she reduce the dose of Effexor to 37.5 mg daily.  Hot flashes are currently infrequent.    4.  Anemia.    · We obtained anemia work-up and she has iron deficiency based on low transferrin saturation of 18%.    · B12 and folate are normal.    · This was likely dietary.  · Patient was treated with Ferrous Sulfate 325 mg daily.   · Hemoglobin improved to 12.7 today.   · Iron stores are currently adequate.  She is taking a multivitamin daily.    PLAN:    1.  Continue Arimidex until late April 2022.   2.  Reduce Effexor to 37.5 mg every other day for 2 weeks and monitor hot flashes.  If there is no worsening, she can stop Effexor at that point.   3.  Bilateral mammograms will be scheduled for late February 2022.    4.  We will see her in follow-up in 1 year with CBC CMP ferritin iron panel.         Sangeeta Tom MD  09/16/21

## 2021-10-04 ENCOUNTER — IMMUNIZATION (OUTPATIENT)
Dept: VACCINE CLINIC | Facility: HOSPITAL | Age: 66
End: 2021-10-04

## 2021-10-04 PROCEDURE — 91300 HC SARSCOV02 VAC 30MCG/0.3ML IM: CPT | Performed by: INTERNAL MEDICINE

## 2021-10-04 PROCEDURE — 0004A ADM SARSCOV2 30MCG/0.3ML BOOSTER: CPT | Performed by: INTERNAL MEDICINE

## 2021-10-04 PROCEDURE — 0003A: CPT | Performed by: INTERNAL MEDICINE

## 2021-11-29 ENCOUNTER — OFFICE VISIT (OUTPATIENT)
Dept: SLEEP MEDICINE | Facility: HOSPITAL | Age: 66
End: 2021-11-29

## 2021-11-29 VITALS
BODY MASS INDEX: 22.44 KG/M2 | HEIGHT: 67 IN | HEART RATE: 72 BPM | SYSTOLIC BLOOD PRESSURE: 137 MMHG | OXYGEN SATURATION: 98 % | WEIGHT: 143 LBS | DIASTOLIC BLOOD PRESSURE: 75 MMHG

## 2021-11-29 DIAGNOSIS — G47.33 OBSTRUCTIVE SLEEP APNEA, ADULT: Primary | ICD-10-CM

## 2021-11-29 PROCEDURE — G0463 HOSPITAL OUTPT CLINIC VISIT: HCPCS

## 2021-11-29 NOTE — PROGRESS NOTES
[facility]                          Chief Complaint:   Follow up for obstructive sleep apnea and comorbid depression    History of present illness:   Subjective     DEEPALI:  PSG on 8/24/2018 showed AHI 23 and RDI 81.  Patient has been on CPAP therapy since then.  She denies any issues with air leak or pressure intolerance.    Patient is here today for yearly follow-up.  She reported using her machine regularly.    Sleep schedule:  Bedtime 8-9 PM and wakes up at 6 AM.     Mask: Nasal pillows which does fit well.  No significant air leak or dry mouth  DME: [employer]    ESS: Total score: 0     Depression:  On Amitriptyline and Effexor. She feels better. She's decreasing the dose.  She has breast cancer and undergoing therapy and she follows with Dr. Mckeon.    REVIEW OF SYSTEMS:   HEENT: No nasal congestion or postnasal drip   CARDIOVASCULAR: No chest pain, chest pressure or chest discomfort. No palpitations or edema.   RESPIRATORY: No shortness of breath, cough or sputum.   GASTROINTESTINAL: No abdominal bloating or reflux   NEUROLOGICAL/PSYCHOATRY: No depression nor anxiety    Past Medical History:  Past Medical History:   Diagnosis Date   • Breast cancer (CMS/HCC) 2017    Right   • Depression    • Hx of radiation therapy    • PONV (postoperative nausea and vomiting)     PREFER SCOPE PATCH,ZOFRAN,PHENERGAN   ,   Past Surgical History:   Procedure Laterality Date   • ANKLE OPEN REDUCTION INTERNAL FIXATION Right 8/27/2018    Procedure: OPEN REDUCTION INTERNAL FIXATION DISLOCATED RT ANKLE FRACTURE;  Surgeon: Cristian Qureshi MD;  Location: [facility] OR [facility] [place];  Service: Orthopedics   • BREAST BIOPSY Right 2012, 2014    benign   • BREAST BIOPSY Right 2017    malignant   • BREAST LUMPECTOMY     • BREAST LUMPECTOMY WITH SENTINEL NODE BIOPSY Right 2/13/2017    Procedure: BREAST LUMPECTOMY WITH SENTINEL NODE BIOPSY AND NEEDLE LOCALIZATION;  Surgeon: Fred Denny MD;  Location: [facility] OR [facility] [place];   Service:    • COLONOSCOPY     • COLONOSCOPY N/A 8/27/2021    Procedure: COLONOSCOPY INTO CECUM/ TERMINAL ILEUM;  Surgeon: Raheem Fuentes MD;  Location: [facility] ENDOSCOPY;  Service: Gastroenterology;  Laterality: N/A;  pre: screening  post: normal TI, hemorrhoids   ,   Social History     Socioeconomic History   • Marital status:    • Number of children: 4   • Years of education: College   Tobacco Use   • Smoking status: Never Smoker   • Smokeless tobacco: Never Used   Substance and Sexual Activity   • Alcohol use: Yes     Alcohol/week: 1.0 - 2.0 standard drink     Types: 1 - 2 Glasses of wine per week     Comment: 1-2 glasses of wine a week   • Drug use: No     Comment: Consumes one to 2 cups of caffeine per day usually coffee   • Sexual activity: Yes     Comment:      E-cigarette/Vaping     E-cigarette/Vaping Substances     E-cigarette/Vaping Devices        and Allergies:  Patient has no known allergies.    Medication Review:     Current Outpatient Medications:   •  alendronate (FOSAMAX) 70 MG tablet, Take 1 tablet by mouth 1 (One) Time Per Week., Disp: 4 tablet, Rfl: 12  •  alendronate (FOSAMAX) 70 MG tablet, Take 1 (one) Tablet Once weekly as directed, Disp: 4 tablet, Rfl: 12  •  amitriptyline (ELAVIL) 10 MG tablet, Take 1 tablet by mouth daily, Disp: 90 tablet, Rfl: 3  •  amitriptyline (ELAVIL) 10 MG tablet, Take 1 tablet by mouth daily, Disp: 90 tablet, Rfl: 3  •  anastrozole (ARIMIDEX) 1 MG tablet, Take 1 tablet by mouth Daily., Disp: 90 tablet, Rfl: 3  •  calcium carbonate-cholecalciferol 500-400 MG-UNIT tablet tablet, Take 1 tablet by mouth Daily., Disp: , Rfl:   •  estradiol (ESTRACE) 0.1 MG/GM vaginal cream, apply pea sized amount to area daily, Disp: 42.5 g, Rfl: 12  •  estradiol (VAGIFEM) 10 MCG tablet vaginal tablet, insert 1 (one) Tablet intravaginally twice weekly, Disp: 8 tablet, Rfl: 12  •  lidocaine (XYLOCAINE) 2 % jelly, Apply  topically to the appropriate area as directed.,  "Disp: 85 g, Rfl: 12  •  Melatonin 10 MG tablet, Take 10 mg by mouth Every Night., Disp: , Rfl:   •  Multiple Vitamins-Minerals (MULTIVITAL PO), Take 1 tablet by mouth Daily., Disp: , Rfl:   •  venlafaxine XR (EFFEXOR-XR) 37.5 MG 24 hr capsule, Take 1 capsule by mouth Daily., Disp: 90 capsule, Rfl: 4      Objective   Vital Signs:  Vitals:    11/29/21 1559   BP: 137/75   Pulse: 72   SpO2: 98%   Weight: 64.9 kg (143 lb)   Height: 171 cm (67.32\")     Body mass index is 22.18 kg/m².          Physical Exam:   General Appearance:    Alert, cooperative, in no acute distress   ENMT: Freidman 3 and mallampati score 2. Scalloped tongue.    Neck:  Trachea midline. No thyromegaly.   Lungs:     Clear to auscultation,respirations regular, even and                  unlabored    Heart:    Regular rhythm and normal rate, normal S1 and S2, no            Murmur.   Abdomen:     Obese.  Soft.  No tenderness.  No HSM    Neuro:   Conscious, alert, oriented x3. Appropriate mood and affect.    Extremities:   Moves all extremities well, no edema, no cyanosis, no             Redness              Diagnostic data:  Polysomnography was performed on: 8/24/2018  AHI= 23/h; Supine AHI: 34/h; RDI = 81    CPAP download showed:  Date: Last 30 days  Usage (days): 100%  Days used>4h: 100%  AHI: 3.4/h  Leak: 0   Usage: 8 h and 29 min  CPAP: 10 cm H2O    Labs 9/16/2021 reviewed:  Ferritin= 191; bicarb 26;    Assessment   1. DEEPALI, on CPAP  2. Depression, controlled  3. Obesity, resolved        PLAN:  Discussed the result of the download.   Patient is compliant with therapy and clinically benefit from treatment.  Patient was encouraged to continue using his CPAP.  Refill supplies.  She has lost about 50 pounds since her initial diagnosis of sleep apnea and therefore her sleep apnea may have improved over time.  Discussed possibly reevaluating her but she feels like she got used to the CPAP at this point.  I agree that she remains at risk for sleep apnea based " on her airway anatomy and since she is tolerating CPAP and doing well then we would recommend to continue treatment.        Discussed the association between obstructive sleep apnea and mood disorders and the beneficial effect of Pap therapy.                    This note was dictated utilizing Dragon dictation

## 2022-02-23 ENCOUNTER — HOSPITAL ENCOUNTER (OUTPATIENT)
Dept: MAMMOGRAPHY | Facility: HOSPITAL | Age: 67
Discharge: HOME | End: 2022-02-23
Admitting: INTERNAL MEDICINE

## 2022-02-23 DIAGNOSIS — Z12.31 ENCOUNTER FOR SCREENING MAMMOGRAM FOR BREAST CANCER: ICD-10-CM

## 2022-02-23 DIAGNOSIS — C50.911 INFILTRATING DUCTAL CARCINOMA OF RIGHT BREAST: ICD-10-CM

## 2022-02-23 PROCEDURE — 77063 BREAST TOMOSYNTHESIS BI: CPT

## 2022-02-23 PROCEDURE — 77067 SCR MAMMO BI INCL CAD: CPT

## 2022-03-25 ENCOUNTER — OFFICE VISIT (OUTPATIENT)
Dept: INTERNAL MEDICINE | Facility: CLINIC | Age: 67
End: 2022-03-25

## 2022-03-25 VITALS
BODY MASS INDEX: 22.44 KG/M2 | TEMPERATURE: 97 F | SYSTOLIC BLOOD PRESSURE: 112 MMHG | WEIGHT: 143 LBS | DIASTOLIC BLOOD PRESSURE: 84 MMHG | RESPIRATION RATE: 16 BRPM | HEART RATE: 86 BPM | HEIGHT: 67 IN | OXYGEN SATURATION: 99 %

## 2022-03-25 DIAGNOSIS — Z23 NEED FOR VACCINATION: ICD-10-CM

## 2022-03-25 DIAGNOSIS — T17.908A ASPIRATION INTO AIRWAY, INITIAL ENCOUNTER: Primary | ICD-10-CM

## 2022-03-25 PROCEDURE — 99213 OFFICE O/P EST LOW 20 MIN: CPT | Performed by: INTERNAL MEDICINE

## 2022-03-25 PROCEDURE — 90471 IMMUNIZATION ADMIN: CPT | Performed by: INTERNAL MEDICINE

## 2022-03-25 PROCEDURE — 90732 PPSV23 VACC 2 YRS+ SUBQ/IM: CPT | Performed by: INTERNAL MEDICINE

## 2022-03-25 NOTE — PROGRESS NOTES
Subjective   Jaimie Jeffries is a 66 y.o. female.     Chief Complaint   Patient presents with   • Choking     Patient states that she has been having trouble swallowing. Patient states that at times while eating, she chokes on her food. Patient denies any of these symptoms while drinking fluids.          In with 6 months of choking spells.  She feels like food goes down into her lungs instead of her esophagus.  Solids seem worse than liquids.  No apparent trouble with dysphagia with solid foods.  She is not sure if she simply eating too fast.       The following portions of the patient's history were reviewed and updated as appropriate: allergies, current medications, past social history and problem list.    Outpatient Medications Marked as Taking for the 3/25/22 encounter (Office Visit) with Rj Crawford MD   Medication Sig Dispense Refill   • alendronate (FOSAMAX) 70 MG tablet Take 1 tablet by mouth 1 (One) Time Per Week as directed. 4 tablet 9   • amitriptyline (ELAVIL) 10 MG tablet Take 1 tablet by mouth daily 90 tablet 3   • anastrozole (ARIMIDEX) 1 MG tablet Take 1 tablet by mouth Daily. 90 tablet 3   • calcium carbonate-cholecalciferol 500-400 MG-UNIT tablet tablet Take 1 tablet by mouth Daily.     • estradiol (VAGIFEM) 10 MCG tablet vaginal tablet insert 1 (one) Tablet intravaginally twice weekly 8 tablet 12   • Melatonin 10 MG tablet Take 10 mg by mouth Every Night.     • Multiple Vitamins-Minerals (MULTIVITAL PO) Take 1 tablet by mouth Daily.     • tretinoin (RETIN-A) 0.025 % cream apply a pea size amount to entire face at bed time 20 g 5   • venlafaxine XR (EFFEXOR-XR) 37.5 MG 24 hr capsule Take 1 capsule by mouth Daily. 90 capsule 4       Review of Systems   Constitutional: Negative for unexpected weight change.   Gastrointestinal: Negative for abdominal pain, constipation and diarrhea.       Objective   Vitals:    03/25/22 1535   BP: 112/84   Pulse: 86   Resp: 16   Temp: 97 °F (36.1 °C)    SpO2: 99%      Wt Readings from Last 3 Encounters:   03/25/22 64.9 kg (143 lb)   11/29/21 64.9 kg (143 lb)   09/16/21 82.6 kg (182 lb 1.6 oz)    Body mass index is 22.18 kg/m².      Physical Exam  Constitutional:       Appearance: Normal appearance.   Pulmonary:      Effort: Pulmonary effort is normal. No respiratory distress.      Breath sounds: No wheezing or rales.   Abdominal:      General: Bowel sounds are normal. There is no distension.      Palpations: Abdomen is soft.      Tenderness: There is no abdominal tenderness. There is no guarding.   Neurological:      Mental Status: She is alert.           Problems Addressed this Visit    None     Visit Diagnoses     Aspiration into airway, initial encounter    -  Primary      Diagnoses       Codes Comments    Aspiration into airway, initial encounter    -  Primary ICD-10-CM: T17.908A  ICD-9-CM: 934.9         Assessment/Plan   In with a 6-month history of aspiration.  Generally with eating solid foods more so than liquids.  No clear evidence of dysphagia.  She thinks she just has a little trouble swallowing sometimes in the throat area.  There has been no weight loss.  Daughter is also concerned about her choking spells.  I think we will start with a speech-language pathology evaluation.  She will likely need a swallow study.  May need endoscopy as well.  Pneumovax upgraded today.    The above information was reviewed again today 03/25/22.  It continues to be accurate as reflected above and is unchanged.  History, physical and review of systems all reviewed and are unchanged.  Medications were reviewed today and continue the current dosing.    PPE today includes face mask and eye shield.               Dragon disclaimer:   Much of this encounter note is an electronic transcription/translation of spoken language to printed text. The electronic translation of spoken language may permit erroneous, or at times, nonsensical words or phrases to be inadvertently  transcribed; Although I have reviewed the note for such errors, some may still exist.

## 2022-07-21 ENCOUNTER — TELEPHONE (OUTPATIENT)
Dept: INTERNAL MEDICINE | Facility: CLINIC | Age: 67
End: 2022-07-21

## 2022-07-21 NOTE — TELEPHONE ENCOUNTER
Caller: Jaimie Jeffries    Relationship to patient: Self    Best call back number: [phone]    Date of exposure: N/A    Date of positive COVID19 test: 7/21 AT HOME TEST    Date if possible COVID19 exposure: THIS WEEKEND. SYMPTOMS STARTED YESTERDAY    COVID19 symptoms: CONGESTION/FEVER/ACHES    Date of initial quarantine: 7/21    Additional information or concerns: PATIENT IS INTERESTED IN PAXLOVID. AND WANTS TO KNOW HOW LONG TO QUARANTINE.     What is the patients preferred pharmacy:   [facility]  [phone]

## 2022-08-22 ENCOUNTER — OFFICE VISIT (OUTPATIENT)
Dept: INTERNAL MEDICINE | Facility: CLINIC | Age: 67
End: 2022-08-22

## 2022-08-22 VITALS
SYSTOLIC BLOOD PRESSURE: 120 MMHG | HEIGHT: 67 IN | BODY MASS INDEX: 30.76 KG/M2 | DIASTOLIC BLOOD PRESSURE: 93 MMHG | TEMPERATURE: 93.3 F | RESPIRATION RATE: 16 BRPM | WEIGHT: 196 LBS | HEART RATE: 68 BPM | OXYGEN SATURATION: 98 %

## 2022-08-22 DIAGNOSIS — Z00.00 ENCOUNTER FOR PREVENTIVE HEALTH EXAMINATION: Primary | ICD-10-CM

## 2022-08-22 DIAGNOSIS — G47.34 SLEEP RELATED HYPOXIA: ICD-10-CM

## 2022-08-22 PROCEDURE — 99397 PER PM REEVAL EST PAT 65+ YR: CPT | Performed by: INTERNAL MEDICINE

## 2022-08-22 NOTE — PROGRESS NOTES
Subjective   Jaimie Jeffries is a 66 y.o. female.     Chief Complaint   Patient presents with   • Annual Exam   • Osteoarthritis   • Palpitations         In for annual preventive exam.  Sleep is good.  Gets 8 to 9 hours per night.  No formal exercise but walks all day at work.  Energy is good.  Diet is well-balanced.    Osteoarthritis  Presents for follow-up visit. Pertinent negatives include no diarrhea, dysuria, fatigue or fever. Her past medical history is significant for osteoarthritis.   Palpitations   This is a chronic problem. The current episode started more than 1 year ago. Pertinent negatives include no anxiety, chest pain, coughing, diaphoresis, dizziness, fever, nausea, shortness of breath, vomiting or weakness.        The following portions of the patient's history were reviewed and updated as appropriate: allergies, current medications, past social history and problem list.    Outpatient Medications Marked as Taking for the 8/22/22 encounter (Office Visit) with Rj Crawford MD   Medication Sig Dispense Refill   • alendronate (FOSAMAX) 70 MG tablet Take 1 (one) Tablet Once weekly as directed 4 tablet 12   • amitriptyline (ELAVIL) 10 MG tablet Take 1 tablet by mouth daily 90 tablet 3   • calcium carbonate-cholecalciferol 500-400 MG-UNIT tablet tablet Take 1 tablet by mouth Daily.     • estradiol (VAGIFEM) 10 MCG tablet vaginal tablet insert 1 (one) Tablet intravaginally twice weekly 8 tablet 12   • Melatonin 10 MG tablet Take 10 mg by mouth Every Night.     • Multiple Vitamins-Minerals (MULTIVITAL PO) Take 1 tablet by mouth Daily.     • mupirocin (BACTROBAN) 2 % ointment Apply externally to the affected area three times daily for 10 days 22 g 1   • tretinoin (RETIN-A) 0.025 % cream apply a pea size amount to entire face at bed time 20 g 5   • venlafaxine XR (EFFEXOR-XR) 37.5 MG 24 hr capsule Take 1 capsule by mouth Daily. 90 capsule 4       Review of Systems   Constitutional: Negative for appetite  change, chills, diaphoresis, fatigue, fever and unexpected weight change.   Respiratory: Negative for cough, chest tightness, shortness of breath and wheezing.    Cardiovascular: Negative for chest pain, palpitations and leg swelling.   Gastrointestinal: Negative for abdominal pain, anal bleeding, blood in stool, constipation, diarrhea, nausea, rectal pain and vomiting.   Endocrine: Negative for cold intolerance, heat intolerance and polyuria.   Genitourinary: Negative for difficulty urinating, dysuria, flank pain, frequency, hematuria and urgency.   Musculoskeletal: Negative for arthralgias, back pain and myalgias.   Allergic/Immunologic: Negative for environmental allergies.   Neurological: Negative for dizziness, syncope, speech difficulty, weakness, light-headedness and headaches.   Hematological: Does not bruise/bleed easily.   Psychiatric/Behavioral: Negative for agitation, confusion, dysphoric mood and sleep disturbance. The patient is not nervous/anxious.        Objective   Vitals:    08/22/22 1529   BP: 120/93   Pulse: 68   Resp: 16   Temp: 93.3 °F (34.1 °C)   SpO2: 98%          08/22/22  1529   Weight: 88.9 kg (196 lb)    [unfilled]  Body mass index is 30.4 kg/m².      Physical Exam   Constitutional: She is oriented to person, place, and time. She appears well-developed.   HENT:   Head: Normocephalic and atraumatic.   Right Ear: External ear normal.   Left Ear: External ear normal.   Nose: Nose normal.   Eyes: Pupils are equal, round, and reactive to light. Conjunctivae are normal.   Neck: No JVD present. No thyromegaly present.   Cardiovascular: Normal rate, regular rhythm and normal heart sounds. Exam reveals no gallop.   No murmur heard.  Pulmonary/Chest: Effort normal and breath sounds normal. No respiratory distress. She has no wheezes. She has no rales.   Abdominal: Soft. Normal appearance and bowel sounds are normal. She exhibits no distension and no mass. There is no abdominal tenderness. There  is no guarding. No hernia.   Musculoskeletal: Normal range of motion.   Lymphadenopathy:     She has no cervical adenopathy.   Neurological: She is alert and oriented to person, place, and time. She displays normal reflexes. No cranial nerve deficit. Coordination normal.   Skin: Skin is warm and dry.   Psychiatric: Her behavior is normal. Mood, judgment and thought content normal.   Nursing note and vitals reviewed.        Problems Addressed this Visit        Sleep    Sleep related hypoxia      Other Visit Diagnoses     Encounter for preventive health examination    -  Primary    Relevant Orders    Lipid Panel With / Chol / HDL Ratio      Diagnoses       Codes Comments    Encounter for preventive health examination    -  Primary ICD-10-CM: Z00.00  ICD-9-CM: V70.0     Sleep related hypoxia     ICD-10-CM: G47.34  ICD-9-CM: 327.24         Assessment & Plan   In for annual preventive exam.  Overall health is excellent.  She has breast cancer and remains on arimadex for hormone blockade.  Surgery was February 2017.  Stage IA disease.  She's due for annual lab work today including lipid panel.  CBC group checks CBC and CMP and iron studies. Follow-up 1 year with annual preventive exam.  She will need to check with the CBC group as to whether she can stop her Fosamax at this point.  She is now off of Arimidex.    Prevention counseling was performed today. The counseling performed was routine health maintenance topics including BMI and exercise.    The above information was reviewed again today 08/22/22.  It continues to be accurate as reflected above and is unchanged.  History, physical and review of systems all reviewed and are unchanged.  Medications were reviewed today and continue the current dosing.    PPE today includes face mask and eye shield.           Dragon disclaimer:   Much of this encounter note is an electronic transcription/translation of spoken language to printed text. The electronic translation of spoken  language may permit erroneous, or at times, nonsensical words or phrases to be inadvertently transcribed; Although I have reviewed the note for such errors, some may still exist.

## 2022-08-23 LAB
CHOLEST SERPL-MCNC: 236 MG/DL (ref 100–199)
CHOLEST/HDLC SERPL: 3.6 RATIO (ref 0–4.4)
HDLC SERPL-MCNC: 65 MG/DL
LDLC SERPL CALC-MCNC: 147 MG/DL (ref 0–99)
TRIGL SERPL-MCNC: 138 MG/DL (ref 0–149)
VLDLC SERPL CALC-MCNC: 24 MG/DL (ref 5–40)

## 2022-08-26 ENCOUNTER — PATIENT ROUNDING (BHMG ONLY) (OUTPATIENT)
Dept: INTERNAL MEDICINE | Facility: CLINIC | Age: 67
End: 2022-08-26

## 2022-08-26 NOTE — PROGRESS NOTES
August 26, 2022    Hello, may I speak with Jaimie Jeffries?    My name is Sarah      I am  with [facility]  [address] [place] [id number] [phone].    Before we get started may I verify your date of birth? 1955    I am calling to officially welcome you to our practice and ask about your recent visit. Is this a good time to talk? yes    Tell me about your visit with us. What things went well?  My visit was great, everyone was very nice.       We're always looking for ways to make our patients' experiences even better. Do you have recommendations on ways we may improve?  no    Overall were you satisfied with your first visit to our practice? yes       I appreciate you taking the time to speak with me today. Is there anything else I can do for you? no      Thank you, and have a great day.

## 2022-09-15 ENCOUNTER — LAB (OUTPATIENT)
Dept: LAB | Facility: HOSPITAL | Age: 67
End: 2022-09-15

## 2022-09-15 ENCOUNTER — OFFICE VISIT (OUTPATIENT)
Dept: ONCOLOGY | Facility: CLINIC | Age: 67
End: 2022-09-15

## 2022-09-15 VITALS
HEART RATE: 66 BPM | TEMPERATURE: 97.3 F | RESPIRATION RATE: 16 BRPM | SYSTOLIC BLOOD PRESSURE: 123 MMHG | DIASTOLIC BLOOD PRESSURE: 78 MMHG | OXYGEN SATURATION: 98 % | WEIGHT: 187.7 LBS | BODY MASS INDEX: 29.46 KG/M2 | HEIGHT: 67 IN

## 2022-09-15 DIAGNOSIS — R23.2 HOT FLASHES: ICD-10-CM

## 2022-09-15 DIAGNOSIS — Z12.31 ENCOUNTER FOR SCREENING MAMMOGRAM FOR BREAST CANCER: ICD-10-CM

## 2022-09-15 DIAGNOSIS — C50.911 INFILTRATING DUCTAL CARCINOMA OF RIGHT BREAST: ICD-10-CM

## 2022-09-15 DIAGNOSIS — D50.9 IRON DEFICIENCY ANEMIA, UNSPECIFIED IRON DEFICIENCY ANEMIA TYPE: ICD-10-CM

## 2022-09-15 DIAGNOSIS — C50.911 INFILTRATING DUCTAL CARCINOMA OF RIGHT BREAST: Primary | ICD-10-CM

## 2022-09-15 LAB
ALBUMIN SERPL-MCNC: 4.7 G/DL (ref 3.5–5.2)
ALBUMIN/GLOB SERPL: 1.7 G/DL (ref 1.1–2.4)
ALP SERPL-CCNC: 79 U/L (ref 38–116)
ALT SERPL W P-5'-P-CCNC: 20 U/L (ref 0–33)
ANION GAP SERPL CALCULATED.3IONS-SCNC: 12.9 MMOL/L (ref 5–15)
AST SERPL-CCNC: 21 U/L (ref 0–32)
BASOPHILS # BLD AUTO: 0.05 10*3/MM3 (ref 0–0.2)
BASOPHILS NFR BLD AUTO: 0.9 % (ref 0–1.5)
BILIRUB SERPL-MCNC: 0.2 MG/DL (ref 0.2–1.2)
BUN SERPL-MCNC: 15 MG/DL (ref 6–20)
BUN/CREAT SERPL: 19.2 (ref 7.3–30)
CALCIUM SPEC-SCNC: 9.9 MG/DL (ref 8.5–10.2)
CHLORIDE SERPL-SCNC: 100 MMOL/L (ref 98–107)
CO2 SERPL-SCNC: 24.1 MMOL/L (ref 22–29)
CREAT SERPL-MCNC: 0.78 MG/DL (ref 0.6–1.1)
DEPRECATED RDW RBC AUTO: 45.1 FL (ref 37–54)
EGFRCR SERPLBLD CKD-EPI 2021: 84.4 ML/MIN/1.73
EOSINOPHIL # BLD AUTO: 0.11 10*3/MM3 (ref 0–0.4)
EOSINOPHIL NFR BLD AUTO: 2 % (ref 0.3–6.2)
ERYTHROCYTE [DISTWIDTH] IN BLOOD BY AUTOMATED COUNT: 12.7 % (ref 12.3–15.4)
FERRITIN SERPL-MCNC: 177.3 NG/ML (ref 13–150)
GLOBULIN UR ELPH-MCNC: 2.8 GM/DL (ref 1.8–3.5)
GLUCOSE SERPL-MCNC: 97 MG/DL (ref 74–124)
HCT VFR BLD AUTO: 37.5 % (ref 34–46.6)
HGB BLD-MCNC: 12.4 G/DL (ref 12–15.9)
IMM GRANULOCYTES # BLD AUTO: 0.01 10*3/MM3 (ref 0–0.05)
IMM GRANULOCYTES NFR BLD AUTO: 0.2 % (ref 0–0.5)
IRON 24H UR-MRATE: 63 MCG/DL (ref 37–145)
IRON SATN MFR SERPL: 18 % (ref 14–48)
LYMPHOCYTES # BLD AUTO: 1.91 10*3/MM3 (ref 0.7–3.1)
LYMPHOCYTES NFR BLD AUTO: 34.7 % (ref 19.6–45.3)
MCH RBC QN AUTO: 31.8 PG (ref 26.6–33)
MCHC RBC AUTO-ENTMCNC: 33.1 G/DL (ref 31.5–35.7)
MCV RBC AUTO: 96.2 FL (ref 79–97)
MONOCYTES # BLD AUTO: 0.39 10*3/MM3 (ref 0.1–0.9)
MONOCYTES NFR BLD AUTO: 7.1 % (ref 5–12)
NEUTROPHILS NFR BLD AUTO: 3.04 10*3/MM3 (ref 1.7–7)
NEUTROPHILS NFR BLD AUTO: 55.1 % (ref 42.7–76)
NRBC BLD AUTO-RTO: 0 /100 WBC (ref 0–0.2)
PLATELET # BLD AUTO: 249 10*3/MM3 (ref 140–450)
PMV BLD AUTO: 8.4 FL (ref 6–12)
POTASSIUM SERPL-SCNC: 4.4 MMOL/L (ref 3.5–4.7)
PROT SERPL-MCNC: 7.5 G/DL (ref 6.3–8)
RBC # BLD AUTO: 3.9 10*6/MM3 (ref 3.77–5.28)
SODIUM SERPL-SCNC: 137 MMOL/L (ref 134–145)
TIBC SERPL-MCNC: 344 MCG/DL (ref 249–505)
TRANSFERRIN SERPL-MCNC: 246 MG/DL (ref 200–360)
WBC NRBC COR # BLD: 5.51 10*3/MM3 (ref 3.4–10.8)

## 2022-09-15 PROCEDURE — 84466 ASSAY OF TRANSFERRIN: CPT

## 2022-09-15 PROCEDURE — 83540 ASSAY OF IRON: CPT

## 2022-09-15 PROCEDURE — 36415 COLL VENOUS BLD VENIPUNCTURE: CPT

## 2022-09-15 PROCEDURE — 80053 COMPREHEN METABOLIC PANEL: CPT

## 2022-09-15 PROCEDURE — 85025 COMPLETE CBC W/AUTO DIFF WBC: CPT

## 2022-09-15 PROCEDURE — 99214 OFFICE O/P EST MOD 30 MIN: CPT | Performed by: INTERNAL MEDICINE

## 2022-09-15 PROCEDURE — 82728 ASSAY OF FERRITIN: CPT

## 2022-09-15 RX ORDER — ONDANSETRON 8 MG/1
8 TABLET, ORALLY DISINTEGRATING ORAL EVERY 8 HOURS PRN
Qty: 20 TABLET | Refills: 1 | Status: SHIPPED | OUTPATIENT
Start: 2022-09-15

## 2022-09-15 NOTE — PROGRESS NOTES
"Subjective     CHIEF COMPLAINT:      Chief Complaint   Patient presents with   • Follow-up     HISTORY OF PRESENT ILLNESS:     Jaimie Jeffries is a 66 y.o. female patient who returns today for follow up on her right breast cancer.  She returns today for follow-up reporting no new symptoms.  She has intermittent pain in the right breast.  No definite precipitating factors.  She is not feeling any new changes in her breasts.    Patient completed 5 years of adjuvant hormonal therapy in April.  After she came off of Arimidex, she noticed resolution of her arthritis symptoms.    Patient is taking Effexor 37.5 mg daily.  She reports developing nausea and vomiting if she misses a dose.  She denies having hot flashes at present.    ROS:  Pertinent ROS is in the HPI.     Past medical, surgical, social and family history were reviewed.     MEDICATIONS:    Current Outpatient Medications:   •  alendronate (FOSAMAX) 70 MG tablet, Take 1 (one) Tablet Once weekly as directed, Disp: 4 tablet, Rfl: 12  •  amitriptyline (ELAVIL) 10 MG tablet, Take 1 tablet by mouth daily, Disp: 90 tablet, Rfl: 3  •  calcium carbonate-cholecalciferol 500-400 MG-UNIT tablet tablet, Take 1 tablet by mouth Daily., Disp: , Rfl:   •  estradiol (VAGIFEM) 10 MCG tablet vaginal tablet, insert 1 (one) Tablet intravaginally twice weekly, Disp: 8 tablet, Rfl: 12  •  Melatonin 10 MG tablet, Take 10 mg by mouth Every Night., Disp: , Rfl:   •  Multiple Vitamins-Minerals (MULTIVITAL PO), Take 1 tablet by mouth Daily., Disp: , Rfl:   •  tretinoin (RETIN-A) 0.025 % cream, apply a pea size amount to entire face at bed time, Disp: 20 g, Rfl: 5  •  venlafaxine XR (EFFEXOR-XR) 37.5 MG 24 hr capsule, Take 1 capsule by mouth Daily., Disp: 90 capsule, Rfl: 4  Objective     VITAL SIGNS:     Vitals:    09/15/22 1641   BP: 123/78   Pulse: 66   Resp: 16   Temp: 97.3 °F (36.3 °C)   TempSrc: Temporal   SpO2: 98%   Weight: 85.1 kg (187 lb 11.2 oz)   Height: 171 cm (67.32\") "   PainSc: 0-No pain     Body mass index is 29.12 kg/m².     Wt Readings from Last 5 Encounters:   09/15/22 85.1 kg (187 lb 11.2 oz)   08/22/22 88.9 kg (196 lb)   03/25/22 64.9 kg (143 lb)   11/29/21 64.9 kg (143 lb)   09/16/21 82.6 kg (182 lb 1.6 oz)     PHYSICAL EXAMINATION:  GENERAL: The patient appears in good general condition, not in acute distress.   SKIN: No ecchymosis.  EYES: No jaundice. No pallor.  LYMPHATICS: No cervical, supraclavicular or axillary lymphadenopathy.  CHEST: Normal respiratory effort.  Lungs clear bilaterally.  No added sounds.  CVS: Normal S1-S2.  No murmurs.  ABDOMEN: Soft. No tenderness. No Hepatomegaly. No Splenomegaly. No masses.  EXTREMITIES: No lymphedema.    DIAGNOSTIC DATA:     Results from last 7 days   Lab Units 09/15/22  1615   WBC 10*3/mm3 5.51   NEUTROS ABS 10*3/mm3 3.04   HEMOGLOBIN g/dL 12.4   HEMATOCRIT % 37.5   PLATELETS 10*3/mm3 249     Results from last 7 days   Lab Units 09/15/22  1615   SODIUM mmol/L 137   POTASSIUM mmol/L 4.4   CHLORIDE mmol/L 100   CO2 mmol/L 24.1   BUN mg/dL 15   CREATININE mg/dL 0.78   CALCIUM mg/dL 9.9   ALBUMIN g/dL 4.70   BILIRUBIN mg/dL 0.2   ALK PHOS U/L 79   ALT (SGPT) U/L 20   AST (SGOT) U/L 21   GLUCOSE mg/dL 97         Lab 09/15/22  1615   IRON 63   IRON SATURATION 18   TIBC 344   TRANSFERRIN 246   FERRITIN 177.30*      Screening mammograms on 2/23/2022:  1. There is no evidence for malignancy or significant change in either  breast. Routine followup mammography is recommended.     BI-RADS category 2: Benign.    Assessment & Plan    *Right breast invasive ductal carcinoma, grade 2 with associated ductal carcinoma in situ.  · Status post lumpectomy and sentinel lymph node biopsy on 2/13/2017  · Maximum dimension was 4 mm.  · Stage IA (T1a N0 M0).  · ER and [place] positive at 90%.  HER-2 negative.  · Adjuvant chemotherapy was not indicated.  · Post lumpectomy radiation therapy was completed on 4/11/2017.  · Adjuvant Arimidex was started on  4/24/2017.  · Adjuvant Arimidex was completed in April 2022.  · Patient reports intermittent pain in the right side of the breast.  This was evaluated with diagnostic right breast mammogram and ultrasound on 11/23/2020 which showed changes of fat necrosis.  · Mammogram on 2/23/2022 was benign.  · She is doing well 5.5 years since her surgery with no evidence of recurrence.     *Osteopenia.   · She is on calcium and vitamin D.  · Bone density on 12/18/2020 revealed osteopenia of both hips. Lumbar spine T score was - 0.1.    · Patient was placed by her gynecologist on Fosamax.   · She is tolerating Fosamax.    *Hot flashes secondary to Arimidex.    · Patient started experiencing the hot flashes after she was started on Arimidex.    · Hot flashes improved and she reduced the dose of Effexor to 37.5 mg daily.    · She reports developing nausea and vomiting when she misses a dose of Effexor.  · She is not having hot flashes at present.  · I recommended reduction of the dose of Effexor slowly with treatment for the nausea and with close monitoring for development of hot flashes and depression symptoms.  · If she is unable to tolerate the reduction, will continue Effexor at 37.5 mg daily.     *History of anemia.    · We obtained anemia work-up and she has iron deficiency based on low transferrin saturation of 18%.  This was considered to be dietary.  · She was previously treated with ferrous sulfate 325 mg daily.  · Hemoglobin is 12.4 today.  Iron stores are considered adequate.  · She has mildly elevated ferritin level that is gradually declining and not considered to be indicative of iron overload.    PLAN:    1.  I recommended reducing Effexor to every other day for 2 weeks. If she tolerates the dose reduction, I recommended reducing it to every 3 days. If she does not develop nausea or depression symptoms, I recommended stopping Effexor.   2.  I prescribed Zofran ODT 8 mg to take every 8 hours as needed.   3.  Next  mammogram will be due in February 2023.   4.  Since she is doing well > 5 years since diagnosis and since she follows with her GYN regularly, I did not schedule her for follow up. We will be available to see her in the future if she has any questions or concerns.       Sangeeta Tom MD  09/15/22

## 2023-01-06 ENCOUNTER — TELEPHONE (OUTPATIENT)
Dept: INTERNAL MEDICINE | Facility: CLINIC | Age: 68
End: 2023-01-06
Payer: COMMERCIAL

## 2023-01-06 DIAGNOSIS — R11.0 NAUSEA: Primary | ICD-10-CM

## 2023-01-06 RX ORDER — SCOLOPAMINE TRANSDERMAL SYSTEM 1 MG/1
1 PATCH, EXTENDED RELEASE TRANSDERMAL
Qty: 12 PATCH | Refills: 0 | Status: SHIPPED | OUTPATIENT
Start: 2023-01-06

## 2023-01-06 NOTE — TELEPHONE ENCOUNTER
Caller: Jaimie Jeffries    Relationship: Self    Best call back number: [phone]    What medication are you requesting: SCOPOLAMINE PATCHES    If a prescription is needed, what is your preferred pharmacy and phone number: [facility]     Additional notes: PATIENT STATED SHE WILL BE GOING ON A CRUISE AND IS REQUESTING THIS MEDICATION BE PRESCRIBED FOR HER TRIP.

## 2023-01-06 NOTE — TELEPHONE ENCOUNTER
This is fine.  Scope patch.  Apply every 72 hours for motion sickness.  The number will depend on the length of her checkup.  Each 1 is good for 3 days

## 2023-01-23 ENCOUNTER — OFFICE VISIT (OUTPATIENT)
Dept: SLEEP MEDICINE | Facility: HOSPITAL | Age: 68
End: 2023-01-23
Payer: COMMERCIAL

## 2023-01-23 VITALS
BODY MASS INDEX: 29.19 KG/M2 | DIASTOLIC BLOOD PRESSURE: 87 MMHG | SYSTOLIC BLOOD PRESSURE: 142 MMHG | HEIGHT: 67 IN | HEART RATE: 74 BPM | WEIGHT: 186 LBS | OXYGEN SATURATION: 99 %

## 2023-01-23 DIAGNOSIS — G47.33 OBSTRUCTIVE SLEEP APNEA, ADULT: Primary | ICD-10-CM

## 2023-01-23 PROCEDURE — G0463 HOSPITAL OUTPT CLINIC VISIT: HCPCS

## 2023-01-23 NOTE — PROGRESS NOTES
[facility]                          Chief Complaint:   Follow up for obstructive sleep apnea and comorbid depression    History of present illness:   Subjective     DEEPALI:  PSG on 8/24/2018 showed AHI 23 and RDI 81/h.  Patient has been on CPAP therapy since then.     Patient is here today for yearly follow-up.  She reported using her machine regularly.  She denies any issues with air leak or pressure intolerance.     Sleep schedule:  Bedtime 9 PM and wakes up at 6 AM.     Mask: Nasal pillows which does fit well.  No significant air leak or dry mouth  DME: [employer]    ESS: Total score: 0     Depression:  On Amitriptyline and Effexor. She feels better.  She has breast cancer and undergoing therapy and she follows with Dr. Mckeon.    REVIEW OF SYSTEMS:   HEENT: No nasal congestion or postnasal drip   CARDIOVASCULAR: No chest pain, chest pressure or chest discomfort. No palpitations or edema.   RESPIRATORY: No shortness of breath, cough or sputum.   GASTROINTESTINAL: No abdominal bloating or reflux   NEUROLOGICAL/PSYCHOATRY: No depression nor anxiety    Past Medical History:  Past Medical History:   Diagnosis Date   • Breast cancer (HCC) 2017    Right   • Depression    • Hx of radiation therapy    • PONV (postoperative nausea and vomiting)     PREFER SCOPE PATCH,ZOFRAN,PHENERGAN   ,   Past Surgical History:   Procedure Laterality Date   • ANKLE OPEN REDUCTION INTERNAL FIXATION Right 8/27/2018    Procedure: OPEN REDUCTION INTERNAL FIXATION DISLOCATED RT ANKLE FRACTURE;  Surgeon: Cristian Qureshi MD;  Location: [facility];  Service: Orthopedics   • BREAST BIOPSY Right 2012, 2014    benign   • BREAST BIOPSY Right 2017    malignant   • BREAST LUMPECTOMY     • BREAST LUMPECTOMY WITH SENTINEL NODE BIOPSY Right 2/13/2017    Procedure: BREAST LUMPECTOMY WITH SENTINEL NODE BIOPSY AND NEEDLE LOCALIZATION;  Surgeon: Fred Denny MD;  Location: [facility];  Service:    • COLONOSCOPY     •  COLONOSCOPY N/A 8/27/2021    Procedure: COLONOSCOPY INTO CECUM/ TERMINAL ILEUM;  Surgeon: Raheem Fuentes MD;  Location: [facility] END[facility];  Service: Gastroenterology;  Laterality: N/A;  pre: screening  post: normal TI, hemorrhoids   ,   Social History     Socioeconomic History   • Marital status:    • Number of children: 4   • Years of education: College   Tobacco Use   • Smoking status: Never   • Smokeless tobacco: Never   Substance and Sexual Activity   • Alcohol use: Yes     Alcohol/week: 1.0 - 2.0 standard drink     Types: 1 - 2 Glasses of wine per week     Comment: 1-2 glasses of wine a week   • Drug use: No     Comment: Consumes one to 2 cups of caffeine per day usually coffee   • Sexual activity: Yes     Comment:         and Allergies:  Patient has no known allergies.    Medication Review:     Current Outpatient Medications:   •  alendronate (FOSAMAX) 70 MG tablet, Take 1 (one) Tablet Once weekly as directed, Disp: 4 tablet, Rfl: 12  •  alendronate (Fosamax) 70 MG tablet, Take 1 (one) Tablet Once weekly as directed, Disp: 4 tablet, Rfl: 12  •  amitriptyline (ELAVIL) 10 MG tablet, Take 1 tablet by mouth daily, Disp: 90 tablet, Rfl: 3  •  amitriptyline (ELAVIL) 10 MG tablet, Take 1 tablet by mouth Daily., Disp: 90 tablet, Rfl: 4  •  calcium carbonate-cholecalciferol 500-400 MG-UNIT tablet tablet, Take 1 tablet by mouth Daily., Disp: , Rfl:   •  cephalexin (KEFLEX) 500 MG capsule, Take 1 capsule by mouth twice a day, Disp: 14 capsule, Rfl: 0  •  estradiol (Vagifem) 10 MCG tablet vaginal tablet, Insert 1 tablet into the vagina 2 (Two) Times a Week., Disp: 8 tablet, Rfl: 12  •  ketoconazole (NIZORAL) 2 % cream, Apply a small amount to affected area once a day, Disp: 30 g, Rfl: 11  •  Melatonin 10 MG tablet, Take 10 mg by mouth Every Night., Disp: , Rfl:   •  Multiple Vitamins-Minerals (MULTIVITAL PO), Take 1 tablet by mouth Daily., Disp: , Rfl:   •  ondansetron ODT (ZOFRAN-ODT) 8 MG  "disintegrating tablet, Place 1 tablet on the tongue Every 8 (Eight) Hours As Needed for Nausea or Vomiting., Disp: 20 tablet, Rfl: 1  •  Scopolamine 1 MG/3DAYS patch, Place 1 patch on the skin as directed by provider Every 72 (Seventy-Two) Hours., Disp: 12 patch, Rfl: 0  •  tretinoin (RETIN-A) 0.025 % cream, apply a pea size amount to entire face at bed time, Disp: 20 g, Rfl: 5  •  triamcinolone (KENALOG) 0.1 % cream, Apply a small amount topically to affected area once a day, Disp: 30 g, Rfl: 11  •  venlafaxine XR (Effexor XR) 37.5 MG 24 hr capsule, Take 1 capsule by mouth Daily., Disp: 90 capsule, Rfl: 4      Objective   Vital Signs:  Vitals:    01/23/23 1535   BP: 142/87   Pulse: 74   SpO2: 99%   Weight: 84.4 kg (186 lb)   Height: 171 cm (67.32\")     Body mass index is 28.86 kg/m².          Physical Exam:   General Appearance:    Alert, cooperative, in no acute distress   ENMT: Freidman 3 and mallampati score 2. Scalloped tongue.    Neck:  Trachea midline. No thyromegaly.   Lungs:     Clear to auscultation,respirations regular, even and                  unlabored    Heart:    Regular rhythm and normal rate, normal S1 and S2, no            Murmur.   Abdomen:     Obese.  Soft.  No tenderness.  No HSM    Neuro:   Conscious, alert, oriented x3. Appropriate mood and affect.    Extremities:   Moves all extremities well, no edema, no cyanosis, no             Redness              Diagnostic data:  Polysomnography was performed on: 8/24/2018  AHI= 23/h; Supine AHI: 34/h; RDI = 81    CPAP download showed:  Date: Last 30 days  Usage (days): 100%  Days used>4h: 100%  AHI: 3.6/h  Leak: 0   Usage: 8 h and 57 min  CPAP: 10 cm H2O    No results found for: HGBA1C  Triglycerides   Date Value Ref Range Status   08/22/2022 138 0 - 149 mg/dL Final     HDL Cholesterol   Date Value Ref Range Status   08/22/2022 65 >39 mg/dL Final     Hemoglobin   Date Value Ref Range Status   09/15/2022 12.4 12.0 - 15.9 g/dL Final     CO2   Date Value " Ref Range Status   09/15/2022 24.1 22.0 - 29.0 mmol/L Final       Assessment   1. DEEPALI, on CPAP  2. Depression, controlled  3. Overweight, BMI 28        PLAN:  Discussed the result of the download.   Patient is compliant with therapy and clinically benefit from treatment.  Patient was encouraged to continue using PAP.  Refill supplies.    Counseled for weight loss.  Encouraged to exercise regularly and cut down on carbohydrates.  Discussed that losing weight may decrease the severity of sleep apnea and obviate the need of CPAP therapy.      Discussed the association between obstructive sleep apnea and mood disorders and the beneficial effect of Pap therapy.                    This note was dictated utilizing Dragon dictation

## 2023-02-24 ENCOUNTER — HOSPITAL ENCOUNTER (OUTPATIENT)
Dept: MAMMOGRAPHY | Facility: HOSPITAL | Age: 68
Discharge: HOME | End: 2023-02-24
Admitting: INTERNAL MEDICINE
Payer: COMMERCIAL

## 2023-02-24 DIAGNOSIS — Z12.31 ENCOUNTER FOR SCREENING MAMMOGRAM FOR BREAST CANCER: ICD-10-CM

## 2023-02-24 PROCEDURE — 77067 SCR MAMMO BI INCL CAD: CPT

## 2023-02-24 PROCEDURE — 77063 BREAST TOMOSYNTHESIS BI: CPT

## 2023-05-01 ENCOUNTER — OFFICE VISIT (OUTPATIENT)
Dept: SLEEP MEDICINE | Facility: HOSPITAL | Age: 68
End: 2023-05-01
Payer: MEDICARE

## 2023-05-01 VITALS
BODY MASS INDEX: 28.69 KG/M2 | OXYGEN SATURATION: 93 % | WEIGHT: 182.8 LBS | HEART RATE: 76 BPM | SYSTOLIC BLOOD PRESSURE: 151 MMHG | HEIGHT: 67 IN | DIASTOLIC BLOOD PRESSURE: 79 MMHG

## 2023-05-01 DIAGNOSIS — G47.33 OSA (OBSTRUCTIVE SLEEP APNEA): Primary | ICD-10-CM

## 2023-05-01 PROCEDURE — G0463 HOSPITAL OUTPT CLINIC VISIT: HCPCS

## 2023-05-01 NOTE — PROGRESS NOTES
[facility]                          Chief Complaint:   F/up DEEPALI and comorbid depression    History of present illness:   Subjective     DEEPALI:  PSG on 8/24/2018 showed AHI 23 and RDI 81/h.  Patient has been on CPAP therapy since then.     Patient is here today for yearly follow-up.  She reported using her machine regularly.  She denies any issues with air leak or pressure intolerance.     Sleep schedule:  Bedtime 9 PM and wakes up at 7:30 AM.    Mask:  DreamWear nasal mask which does fit well.  No significant air leak or dry mouth  DME: [employer]    ESS: Total score: 0     Depression:  On Amitriptyline and Effexor. She feels better.  She has breast cancer and undergoing therapy and she follows with Dr. Mckeon.  Last time on her visit on 9/15/2022 her Effexor was reduced to every other day.    REVIEW OF SYSTEMS:   HEENT: No nasal congestion or postnasal drip   CARDIOVASCULAR: No chest pain, chest pressure or chest discomfort. No palpitations or edema.   RESPIRATORY: No shortness of breath, cough or sputum.   GASTROINTESTINAL: No abdominal bloating or reflux   NEUROLOGICAL/PSYCHOATRY: No depression nor anxiety    Past Medical History:  Past Medical History:   Diagnosis Date   • Breast cancer 2017    Right   • Depression    • Hx of radiation therapy    • PONV (postoperative nausea and vomiting)     PREFER SCOPE PATCH,ZOFRAN,PHENERGAN   ,   Past Surgical History:   Procedure Laterality Date   • ANKLE OPEN REDUCTION INTERNAL FIXATION Right 8/27/2018    Procedure: OPEN REDUCTION INTERNAL FIXATION DISLOCATED RT ANKLE FRACTURE;  Surgeon: Cristian Qureshi MD;  Location: [facility] OR [facility] [place];  Service: Orthopedics   • BREAST BIOPSY Right 2012, 2014    benign   • BREAST BIOPSY Right 2017    malignant   • BREAST LUMPECTOMY     • BREAST LUMPECTOMY WITH SENTINEL NODE BIOPSY Right 2/13/2017    Procedure: BREAST LUMPECTOMY WITH SENTINEL NODE BIOPSY AND NEEDLE LOCALIZATION;  Surgeon: Fred Denny MD;   Location:  [facility];  Service:    • COLONOSCOPY     • COLONOSCOPY N/A 8/27/2021    Procedure: COLONOSCOPY INTO CECUM/ TERMINAL ILEUM;  Surgeon: Raheem Fuentes MD;  Location: [facility] ENDOSCOPY;  Service: Gastroenterology;  Laterality: N/A;  pre: screening  post: normal TI, hemorrhoids   ,   Social History     Socioeconomic History   • Marital status:    • Number of children: 4   • Years of education: College   Tobacco Use   • Smoking status: Never   • Smokeless tobacco: Never   Substance and Sexual Activity   • Alcohol use: Yes     Alcohol/week: 1.0 - 2.0 standard drink     Types: 1 - 2 Glasses of wine per week     Comment: 1-2 glasses of wine a week   • Drug use: No     Comment: Consumes one to 2 cups of caffeine per day usually coffee   • Sexual activity: Yes     Comment:         and Allergies:  Patient has no known allergies.    Medication Review:     Current Outpatient Medications:   •  alendronate (FOSAMAX) 70 MG tablet, Take 1 (one) Tablet Once weekly as directed, Disp: 4 tablet, Rfl: 12  •  alendronate (Fosamax) 70 MG tablet, Take 1 (one) Tablet Once weekly as directed, Disp: 4 tablet, Rfl: 12  •  amitriptyline (ELAVIL) 10 MG tablet, Take 1 tablet by mouth daily, Disp: 90 tablet, Rfl: 3  •  amitriptyline (ELAVIL) 10 MG tablet, Take 1 tablet by mouth Daily., Disp: 90 tablet, Rfl: 4  •  calcium carbonate-cholecalciferol 500-400 MG-UNIT tablet tablet, Take 1 tablet by mouth Daily., Disp: , Rfl:   •  cephalexin (KEFLEX) 500 MG capsule, Take 1 capsule by mouth twice a day, Disp: 14 capsule, Rfl: 0  •  estradiol (Vagifem) 10 MCG tablet vaginal tablet, Insert 1 tablet into the vagina 2 (Two) Times a Week., Disp: 8 tablet, Rfl: 12  •  ketoconazole (NIZORAL) 2 % cream, Apply a small amount to affected area once a day, Disp: 30 g, Rfl: 11  •  Melatonin 10 MG tablet, Take 10 mg by mouth Every Night., Disp: , Rfl:   •  Multiple Vitamins-Minerals (MULTIVITAL PO), Take 1 tablet by mouth Daily.,  "Disp: , Rfl:   •  ondansetron ODT (ZOFRAN-ODT) 8 MG disintegrating tablet, Place 1 tablet on the tongue Every 8 (Eight) Hours As Needed for Nausea or Vomiting., Disp: 20 tablet, Rfl: 1  •  Scopolamine 1 MG/3DAYS patch, Place 1 patch on the skin as directed by provider Every 72 (Seventy-Two) Hours., Disp: 12 patch, Rfl: 0  •  tretinoin (RETIN-A) 0.025 % cream, apply a pea size amount to entire face at bed time, Disp: 20 g, Rfl: 5  •  triamcinolone (KENALOG) 0.1 % cream, Apply a small amount topically to affected area once a day, Disp: 30 g, Rfl: 11  •  venlafaxine XR (Effexor XR) 37.5 MG 24 hr capsule, Take 1 capsule by mouth Daily., Disp: 90 capsule, Rfl: 4      Objective   Vital Signs:  Vitals:    05/01/23 0900   BP: 151/79   Pulse: 76   SpO2: 93%   Weight: 82.9 kg (182 lb 12.8 oz)   Height: 171 cm (67.32\")     Body mass index is 28.36 kg/m².          Physical Exam:   General Appearance:    Alert, cooperative, in no acute distress   ENMT: Freidman 3 and mallampati score 2. Scalloped tongue.    Neck:  Trachea midline. No thyromegaly.   Lungs:     Clear to auscultation,respirations regular, even and                  unlabored    Heart:    Regular rhythm and normal rate, normal S1 and S2, no            Murmur.   Abdomen:     Obese.  Soft.  No tenderness.  No HSM    Neuro:   Conscious, alert, oriented x3. Appropriate mood and affect.    Extremities:   Moves all extremities well, no edema, no cyanosis, no             Redness              Diagnostic data:  Polysomnography was performed on: 8/24/2018  AHI= 23/h; Supine AHI: 34/h; RDI = 81    CPAP download showed:  Date: Last 30 days  Usage (days): 100%  Days used>4h: 100%  AHI: 3.9/h  Leak: 0   Usage: 8 h and 54 min  CPAP: 10 cm H2O    No results found for: HGBA1C  Triglycerides   Date Value Ref Range Status   08/22/2022 138 0 - 149 mg/dL Final     HDL Cholesterol   Date Value Ref Range Status   08/22/2022 65 >39 mg/dL Final     Hemoglobin   Date Value Ref Range Status "   09/15/2022 12.4 12.0 - 15.9 g/dL Final     CO2   Date Value Ref Range Status   09/15/2022 24.1 22.0 - 29.0 mmol/L Final       Assessment   1. DEEPALI, on CPAP  2. Depression, controlled  3. Overweight, BMI 28        PLAN:  Discussed the result of the download.   Patient is compliant with therapy and clinically benefit from treatment.  Patient was encouraged to continue using PAP.  Refill supplies.    Counseled for weight loss.  Encouraged to exercise regularly and cut down on carbohydrates.  Discussed that losing weight may decrease the severity of sleep apnea and obviate the need of CPAP therapy.      Depression seems to be stable on meds and while on Pap therapy and emphasized about the importance of compliance with PAP in the setting of mood disorders.                  This note was dictated utilizing Dragon dictation

## 2023-09-05 ENCOUNTER — TELEPHONE (OUTPATIENT)
Dept: SLEEP MEDICINE | Facility: HOSPITAL | Age: 68
End: 2023-09-05
Payer: MEDICARE

## 2023-09-05 NOTE — TELEPHONE ENCOUNTER
PT called and said her CPAP is making a noise and she would like a new CPAP waiting for DR Zuniga to send orders for new cpap .

## 2023-09-08 DIAGNOSIS — G47.33 OSA (OBSTRUCTIVE SLEEP APNEA): Primary | ICD-10-CM

## 2024-01-08 ENCOUNTER — OFFICE VISIT (OUTPATIENT)
Dept: SLEEP MEDICINE | Facility: HOSPITAL | Age: 69
End: 2024-01-08
Payer: MEDICARE

## 2024-01-08 VITALS
HEIGHT: 66 IN | OXYGEN SATURATION: 95 % | SYSTOLIC BLOOD PRESSURE: 147 MMHG | WEIGHT: 185 LBS | DIASTOLIC BLOOD PRESSURE: 83 MMHG | HEART RATE: 79 BPM | BODY MASS INDEX: 29.73 KG/M2

## 2024-01-08 DIAGNOSIS — G47.33 OBSTRUCTIVE SLEEP APNEA, ADULT: Primary | ICD-10-CM

## 2024-01-08 PROCEDURE — G0463 HOSPITAL OUTPT CLINIC VISIT: HCPCS

## 2024-01-08 NOTE — PROGRESS NOTES
[facility]                          Chief Complaint:   F/up DEEPALI and comorbid depression    History of present illness:   Subjective     DEEPALI:  PSG on 8/24/2018 showed AHI 23 and RDI 81/h.  Patient has been on CPAP therapy since then.     She received a new CPAP in 2023.   Patient is here today for yearly follow-up.  She reported using her machine regularly.  She denies any issues with air leak or pressure intolerance. She had severe RSV infection in Dec 2023 and was not able to use her CPAP.     Sleep schedule:  Bedtime 9 PM and wakes up at 6:30 AM.    Mask:  DreamWear nasal mask which does fit well.  No significant air leak or dry mouth  DME: [employer].     ESS: Total score: 2     Depression:  On Amitriptyline and Effexor. She feels better.  She had breast cancer and finished therapy .    REVIEW OF SYSTEMS:   HEENT: No nasal congestion or postnasal drip   CARDIOVASCULAR: No chest pain, chest pressure or chest discomfort. No palpitations or edema.   RESPIRATORY: No shortness of breath, cough or sputum.   GASTROINTESTINAL: No abdominal bloating or reflux   NEUROLOGICAL/PSYCHOATRY: No depression nor anxiety    Past Medical History:  Past Medical History:   Diagnosis Date    Breast cancer 2017    Right    Depression     Hx of radiation therapy     PONV (postoperative nausea and vomiting)     PREFER SCOPE PATCH,ZOFRAN,PHENERGAN   ,   Past Surgical History:   Procedure Laterality Date    ANKLE OPEN REDUCTION INTERNAL FIXATION Right 8/27/2018    Procedure: OPEN REDUCTION INTERNAL FIXATION DISLOCATED RT ANKLE FRACTURE;  Surgeon: Cristian Qureshi MD;  [place]: [facility] [place] [facility] [place];  Service: Orthopedics    BREAST BIOPSY Right 2012, 2014    benign    BREAST BIOPSY Right 2017    malignant    BREAST LUMPECTOMY      BREAST LUMPECTOMY WITH SENTINEL NODE BIOPSY Right 2/13/2017    Procedure: BREAST LUMPECTOMY WITH SENTINEL NODE BIOPSY AND NEEDLE LOCALIZATION;  Surgeon: Fred Denny MD;   Location:  [facility];  Service:     COLONOSCOPY      COLONOSCOPY N/A 8/27/2021    Procedure: COLONOSCOPY INTO CECUM/ TERMINAL ILEUM;  Surgeon: Raheem Fuentes MD;  Location: [facility] ENDOSCOPY;  Service: Gastroenterology;  Laterality: N/A;  pre: screening  post: normal TI, hemorrhoids   ,   Social History     Socioeconomic History    Marital status:     Number of children: 4    Years of education: College   Tobacco Use    Smoking status: Never    Smokeless tobacco: Never   Vaping Use    Vaping Use: Never used   Substance and Sexual Activity    Alcohol use: Yes     Alcohol/week: 1.0 - 2.0 standard drink of alcohol     Types: 1 - 2 Glasses of wine per week     Comment: 1-2 glasses of wine a week    Drug use: No     Comment: Consumes one to 2 cups of caffeine per day usually coffee    Sexual activity: Yes     Comment:         and Allergies:  Patient has no known allergies.    Medication Review:     Current Outpatient Medications:     alendronate (FOSAMAX) 70 MG tablet, Take 1 (one) Tablet Once weekly as directed, Disp: 4 tablet, Rfl: 12    calcium carbonate-cholecalciferol 500-400 MG-UNIT tablet tablet, Take 1 tablet by mouth Daily., Disp: , Rfl:     estradiol (Vagifem) 10 MCG tablet vaginal tablet, Insert 1 tablet into the vagina 2 (Two) Times a Week., Disp: 8 tablet, Rfl: 12    fluticasone (FLONASE) 50 MCG/ACT nasal spray, 2 sprays into the nostril(s) as directed by provider Daily., Disp: 9.9 mL, Rfl: 0    ibuprofen (ADVIL,MOTRIN) 600 MG tablet, Take 1 tablet by mouth Every 8 (Eight) Hours As Needed. for pain, Disp: , Rfl:     Melatonin 10 MG tablet, Take 1 tablet by mouth Every Night., Disp: , Rfl:     Multiple Vitamins-Minerals (MULTIVITAL PO), Take 1 tablet by mouth Daily., Disp: , Rfl:     promethazine-dextromethorphan (PROMETHAZINE-DM) 6.25-15 MG/5ML syrup, Take 5 mL by mouth 4 (Four) Times a Day As Needed for Cough., Disp: 240 mL, Rfl: 0    Scopolamine 1 MG/3DAYS patch, Place 1 patch on  "the skin as directed by provider Every 72 (Seventy-Two) Hours., Disp: 12 patch, Rfl: 0    tretinoin (RETIN-A) 0.025 % cream, apply a pea size amount to entire face at bed time, Disp: 20 g, Rfl: 5    venlafaxine XR (Effexor XR) 37.5 MG 24 hr capsule, Take 1 capsule by mouth Daily., Disp: 90 capsule, Rfl: 4      Objective   Vital Signs:  Vitals:    01/08/24 1107   BP: 147/83   Pulse: 79   SpO2: 95%   Weight: 83.9 kg (185 lb)   Height: 167.6 cm (65.98\")     Body mass index is 29.88 kg/m².          Physical Exam:   General Appearance:    Alert, cooperative, in no acute distress   ENMT: Freidman 3 and mallampati score 2. Scalloped tongue.    Neck:  Trachea midline. No thyromegaly.   Lungs:     Clear to auscultation,respirations regular, even and unlabored    Heart:    Regular rhythm and normal rate, normal S1 and S2, no  murmur.   Abdomen:     Obese.  Soft.  No tenderness.  No HSM    Neuro:   Conscious, alert, oriented x3. Appropriate mood and affect.    Extremities:   Moves all extremities well, no edema, no cyanosis, no   redness              Diagnostic data:  Polysomnography was performed on: 8/24/2018  AHI= 23/h; Supine AHI: 34/h; RDI = 81    CPAP download showed:  Date: Last 30 days  Usage (days): 77%  Days used>4h: 63%  AHI: 2/h  Leak: 17   Usage: 6 h and 33 min  CPAP: 10 cm H2O    No results found for: \"HGBA1C\"  Triglycerides   Date Value Ref Range Status   08/22/2022 138 0 - 149 mg/dL Final     HDL Cholesterol   Date Value Ref Range Status   08/22/2022 65 >39 mg/dL Final     Hemoglobin   Date Value Ref Range Status   09/15/2022 12.4 12.0 - 15.9 g/dL Final     CO2   Date Value Ref Range Status   09/15/2022 24.1 22.0 - 29.0 mmol/L Final       Assessment   DEEPALI, on CPAP  Depression, controlled  Overweight, BMI 29        PLAN:  Discussed the result of the download.   Patient is compliant with therapy and clinically benefit from treatment.  Patient was encouraged to continue using PAP.  Refill supplies.    Counseled " for weight loss.  Encouraged to exercise regularly and cut down on carbohydrates.  Discussed that losing weight may decrease the severity of sleep apnea and obviate the need of CPAP therapy.      Depression seems to be stable on meds and while on Pap therapy and emphasized about the importance of compliance with PAP in the setting of mood disorders.                  This note was dictated utilizing Dragon dictation

## 2024-01-30 ENCOUNTER — TRANSCRIBE ORDERS (OUTPATIENT)
Dept: ADMINISTRATIVE | Facility: HOSPITAL | Age: 69
End: 2024-01-30
Payer: MEDICARE

## 2024-01-30 DIAGNOSIS — Z12.31 VISIT FOR SCREENING MAMMOGRAM: Primary | ICD-10-CM

## 2024-02-01 ENCOUNTER — LAB (OUTPATIENT)
Dept: LAB | Facility: HOSPITAL | Age: 69
End: 2024-02-01
Payer: MEDICARE

## 2024-02-01 ENCOUNTER — OFFICE VISIT (OUTPATIENT)
Dept: INTERNAL MEDICINE | Facility: CLINIC | Age: 69
End: 2024-02-01
Payer: MEDICARE

## 2024-02-01 VITALS
BODY MASS INDEX: 29.16 KG/M2 | OXYGEN SATURATION: 94 % | SYSTOLIC BLOOD PRESSURE: 122 MMHG | RESPIRATION RATE: 16 BRPM | DIASTOLIC BLOOD PRESSURE: 68 MMHG | TEMPERATURE: 97.8 F | WEIGHT: 175 LBS | HEART RATE: 64 BPM | HEIGHT: 65 IN

## 2024-02-01 DIAGNOSIS — Z00.00 ENCOUNTER FOR ANNUAL WELLNESS EXAM IN MEDICARE PATIENT: Primary | ICD-10-CM

## 2024-02-01 DIAGNOSIS — R11.0 NAUSEA: ICD-10-CM

## 2024-02-01 DIAGNOSIS — Z79.899 MEDICATION MANAGEMENT: ICD-10-CM

## 2024-02-01 DIAGNOSIS — M85.80 OSTEOPENIA, UNSPECIFIED LOCATION: ICD-10-CM

## 2024-02-01 DIAGNOSIS — Z23 NEED FOR VACCINATION: ICD-10-CM

## 2024-02-01 DIAGNOSIS — G47.33 OSA ON CPAP: Chronic | ICD-10-CM

## 2024-02-01 DIAGNOSIS — C50.911 INFILTRATING DUCTAL CARCINOMA OF RIGHT BREAST: ICD-10-CM

## 2024-02-01 PROBLEM — R00.2 PALPITATIONS: Chronic | Status: RESOLVED | Noted: 2018-07-09 | Resolved: 2024-02-01

## 2024-02-01 PROBLEM — R00.2 PALPITATIONS: Chronic | Status: ACTIVE | Noted: 2018-07-09

## 2024-02-01 LAB
ALBUMIN SERPL-MCNC: 4.4 G/DL (ref 3.5–5.2)
ALBUMIN/GLOB SERPL: 1.7 G/DL
ALP SERPL-CCNC: 84 U/L (ref 39–117)
ALT SERPL W P-5'-P-CCNC: 31 U/L (ref 1–33)
ANION GAP SERPL CALCULATED.3IONS-SCNC: 12.2 MMOL/L (ref 5–15)
AST SERPL-CCNC: 26 U/L (ref 1–32)
BASOPHILS # BLD AUTO: 0.03 10*3/MM3 (ref 0–0.2)
BASOPHILS NFR BLD AUTO: 0.5 % (ref 0–1.5)
BILIRUB SERPL-MCNC: 0.2 MG/DL (ref 0–1.2)
BUN SERPL-MCNC: 14 MG/DL (ref 8–23)
BUN/CREAT SERPL: 22.2 (ref 7–25)
CALCIUM SPEC-SCNC: 9.4 MG/DL (ref 8.6–10.5)
CHLORIDE SERPL-SCNC: 105 MMOL/L (ref 98–107)
CO2 SERPL-SCNC: 26.8 MMOL/L (ref 22–29)
CREAT SERPL-MCNC: 0.63 MG/DL (ref 0.57–1)
DEPRECATED RDW RBC AUTO: 41.4 FL (ref 37–54)
EGFRCR SERPLBLD CKD-EPI 2021: 97.4 ML/MIN/1.73
EOSINOPHIL # BLD AUTO: 0.1 10*3/MM3 (ref 0–0.4)
EOSINOPHIL NFR BLD AUTO: 1.8 % (ref 0.3–6.2)
ERYTHROCYTE [DISTWIDTH] IN BLOOD BY AUTOMATED COUNT: 12.6 % (ref 12.3–15.4)
GLOBULIN UR ELPH-MCNC: 2.6 GM/DL
GLUCOSE SERPL-MCNC: 103 MG/DL (ref 65–99)
HCT VFR BLD AUTO: 36.9 % (ref 34–46.6)
HGB BLD-MCNC: 12 G/DL (ref 12–15.9)
IMM GRANULOCYTES # BLD AUTO: 0.03 10*3/MM3 (ref 0–0.05)
IMM GRANULOCYTES NFR BLD AUTO: 0.5 % (ref 0–0.5)
LYMPHOCYTES # BLD AUTO: 1.76 10*3/MM3 (ref 0.7–3.1)
LYMPHOCYTES NFR BLD AUTO: 32.1 % (ref 19.6–45.3)
MCH RBC QN AUTO: 29.6 PG (ref 26.6–33)
MCHC RBC AUTO-ENTMCNC: 32.5 G/DL (ref 31.5–35.7)
MCV RBC AUTO: 91.1 FL (ref 79–97)
MONOCYTES # BLD AUTO: 0.43 10*3/MM3 (ref 0.1–0.9)
MONOCYTES NFR BLD AUTO: 7.8 % (ref 5–12)
NEUTROPHILS NFR BLD AUTO: 3.14 10*3/MM3 (ref 1.7–7)
NEUTROPHILS NFR BLD AUTO: 57.3 % (ref 42.7–76)
NRBC BLD AUTO-RTO: 0 /100 WBC (ref 0–0.2)
PLATELET # BLD AUTO: 345 10*3/MM3 (ref 140–450)
PMV BLD AUTO: 8.3 FL (ref 6–12)
POTASSIUM SERPL-SCNC: 3.9 MMOL/L (ref 3.5–5.2)
PROT SERPL-MCNC: 7 G/DL (ref 6–8.5)
RBC # BLD AUTO: 4.05 10*6/MM3 (ref 3.77–5.28)
SODIUM SERPL-SCNC: 144 MMOL/L (ref 136–145)
WBC NRBC COR # BLD AUTO: 5.49 10*3/MM3 (ref 3.4–10.8)

## 2024-02-01 PROCEDURE — 80053 COMPREHEN METABOLIC PANEL: CPT | Performed by: INTERNAL MEDICINE

## 2024-02-01 PROCEDURE — 36415 COLL VENOUS BLD VENIPUNCTURE: CPT | Performed by: INTERNAL MEDICINE

## 2024-02-01 PROCEDURE — 85025 COMPLETE CBC W/AUTO DIFF WBC: CPT | Performed by: INTERNAL MEDICINE

## 2024-02-01 RX ORDER — SCOLOPAMINE TRANSDERMAL SYSTEM 1 MG/1
1 PATCH, EXTENDED RELEASE TRANSDERMAL
Qty: 4 PATCH | Refills: 0 | Status: SHIPPED | OUTPATIENT
Start: 2024-02-01

## 2024-02-01 NOTE — PROGRESS NOTES
Subjective   Jaimie Jeffries is a 68 y.o. female.     Chief Complaint   Patient presents with    Medicare Wellness-subsequent    osteopenia         History of Present Illness  In for annual recheck of osteopenia, obstructive sleep apnea and history of breast cancer.  Also annual wellness visit today.  Remains on Fosamax for her osteopenia.  She has been on that for about 3 years starting with her Arimidex therapy for breast cancer.       The following portions of the patient's history were reviewed and updated as appropriate: allergies, current medications, past social history and problem list.    Outpatient Medications Marked as Taking for the 2/1/24 encounter (Office Visit) with Rj Crawford MD   Medication Sig Dispense Refill    alendronate (FOSAMAX) 70 MG tablet Take 1 (one) Tablet Once weekly as directed 4 tablet 12    calcium carbonate-cholecalciferol 500-400 MG-UNIT tablet tablet Take 1 tablet by mouth Daily.      estradiol (Vagifem) 10 MCG tablet vaginal tablet Insert 1 tablet into the vagina 2 (Two) Times a Week. 8 tablet 12    fluticasone (FLONASE) 50 MCG/ACT nasal spray 2 sprays into the nostril(s) as directed by provider Daily. 9.9 mL 0    ibuprofen (ADVIL,MOTRIN) 600 MG tablet Take 1 tablet by mouth Every 8 (Eight) Hours As Needed. for pain      Melatonin 10 MG tablet Take 1 tablet by mouth Every Night.      Multiple Vitamins-Minerals (MULTIVITAL PO) Take 1 tablet by mouth Daily.      ondansetron ODT (ZOFRAN-ODT) 8 MG disintegrating tablet Place 1 tablet on the tongue Every 8 (Eight) Hours As Needed for Nausea or Vomiting. 21 tablet 0    Scopolamine 1 MG/3DAYS patch Place 1 patch on the skin as directed by provider Every 72 (Seventy-Two) Hours. 12 patch 0    tretinoin (RETIN-A) 0.025 % cream apply a pea size amount to entire face at bed time 20 g 5    venlafaxine XR (Effexor XR) 37.5 MG 24 hr capsule Take 1 capsule by mouth Daily. 90 capsule 4       Review of Systems   Respiratory:  Negative  for shortness of breath and wheezing.    Cardiovascular:  Negative for chest pain, palpitations and leg swelling.   Gastrointestinal:  Negative for constipation and diarrhea.   Neurological:  Negative for headaches.       Objective   Vitals:    02/01/24 1406   BP: 122/68   Pulse: 64   Resp: 16   Temp: 97.8 °F (36.6 °C)   SpO2: 94%      Wt Readings from Last 3 Encounters:   02/01/24 79.4 kg (175 lb)   01/24/24 81.6 kg (180 lb)   01/08/24 83.9 kg (185 lb)    Body mass index is 29.12 kg/m².      Physical Exam  Constitutional:       Appearance: Normal appearance. She is well-developed.   Neck:      Thyroid: No thyromegaly.   Cardiovascular:      Rate and Rhythm: Normal rate and regular rhythm.      Heart sounds: Normal heart sounds. No murmur heard.     No gallop.   Pulmonary:      Effort: Pulmonary effort is normal. No respiratory distress.      Breath sounds: Normal breath sounds. No wheezing or rales.   Abdominal:      General: Bowel sounds are normal.      Palpations: Abdomen is soft. There is no mass.      Tenderness: There is no abdominal tenderness. There is no guarding.   Neurological:      Mental Status: She is alert.           Problems Addressed this Visit          Hematology and Neoplasia    Infiltrating ductal carcinoma of right breast       Musculoskeletal and Injuries    Osteopenia       Sleep    DEEPALI on CPAP (Chronic)     Other Visit Diagnoses       Encounter for annual wellness exam in Medicare patient    -  Primary          Diagnoses         Codes Comments    Encounter for annual wellness exam in Medicare patient    -  Primary ICD-10-CM: Z00.00  ICD-9-CM: V70.0     Infiltrating ductal carcinoma of right breast     ICD-10-CM: C50.911  ICD-9-CM: 174.9     Osteopenia, unspecified location     ICD-10-CM: M85.80  ICD-9-CM: 733.90     DEEPALI on CPAP     ICD-10-CM: G47.33  ICD-9-CM: 327.23           Assessment & Plan   In today for annual recheck of osteopenia, breast cancer and obstructive sleep apnea along with  annual wellness visit today February 2024..  She remains on Fosamax 70 mg weekly and that is being monitored by her gynecologist.  I suspect she was on it for her Arimidex therapy for breast cancer.  She could likely go on a drug holiday now.  She will discuss that with her gynecologist.  Will need a refill on scope patches for an upcoming 12-day cruise . annual lab work today will include CBC, CMP, UA.  She is 2 HPP today.  Follow-up 1 year.  Due for Prevnar 20.    The above information was reviewed again today 02/01/24.  It continues to be accurate as reflected above and is unchanged.  History, physical and review of systems all reviewed and are unchanged.  Medications were reviewed today and continue the current dosing.               Dragon disclaimer:   Part of this note may be an electronic transcription/translation of spoken language to printed text using the Dragon Dictation System.

## 2024-02-01 NOTE — PROGRESS NOTES
The ABCs of the Annual Wellness Visit  Subsequent Medicare Wellness Visit    Subjective      Jaimie Jeffries is a 68 y.o. female who presents for a Subsequent Medicare Wellness Visit.    The following portions of the patient's history were reviewed and   updated as appropriate: allergies, current medications, past family history, past medical history, past social history, past surgical history, and problem list.    Compared to one year ago, the patient feels her physical   health is the same.    Compared to one year ago, the patient feels her mental   health is the same.    Recent Hospitalizations:  She was not admitted to the hospital during the last year.       Current Medical Providers:  Patient Care Team:  Rj Crawford MD as PCP - General (Internal Medicine)  Rj Crawford MD as PCP - Internal Medicine  Fred Denny MD as Referring Physician (Breast Surgery)  Sangeeta Tom MD as Consulting Physician (Hematology and Oncology)  Jennifer Mcclendon MD as Consulting Physician (Obstetrics and Gynecology)    Outpatient Medications Prior to Visit   Medication Sig Dispense Refill    alendronate (FOSAMAX) 70 MG tablet Take 1 (one) Tablet Once weekly as directed 4 tablet 12    calcium carbonate-cholecalciferol 500-400 MG-UNIT tablet tablet Take 1 tablet by mouth Daily.      estradiol (Vagifem) 10 MCG tablet vaginal tablet Insert 1 tablet into the vagina 2 (Two) Times a Week. 8 tablet 12    fluticasone (FLONASE) 50 MCG/ACT nasal spray 2 sprays into the nostril(s) as directed by provider Daily. 9.9 mL 0    ibuprofen (ADVIL,MOTRIN) 600 MG tablet Take 1 tablet by mouth Every 8 (Eight) Hours As Needed. for pain      Melatonin 10 MG tablet Take 1 tablet by mouth Every Night.      Multiple Vitamins-Minerals (MULTIVITAL PO) Take 1 tablet by mouth Daily.      ondansetron ODT (ZOFRAN-ODT) 8 MG disintegrating tablet Place 1 tablet on the tongue Every 8 (Eight) Hours As Needed for Nausea or Vomiting. 21 tablet 0     "Scopolamine 1 MG/3DAYS patch Place 1 patch on the skin as directed by provider Every 72 (Seventy-Two) Hours. 12 patch 0    tretinoin (RETIN-A) 0.025 % cream apply a pea size amount to entire face at bed time 20 g 5    venlafaxine XR (Effexor XR) 37.5 MG 24 hr capsule Take 1 capsule by mouth Daily. 90 capsule 4    promethazine-dextromethorphan (PROMETHAZINE-DM) 6.25-15 MG/5ML syrup Take 5 mL by mouth 4 (Four) Times a Day As Needed for Cough. 240 mL 0     No facility-administered medications prior to visit.       No opioid medication identified on active medication list. I have reviewed chart for other potential  high risk medication/s and harmful drug interactions in the elderly.        Aspirin is not on active medication list.  Aspirin use is not indicated based on review of current medical condition/s. Risk of harm outweighs potential benefits.  .    Patient Active Problem List   Diagnosis    Palpitations    PLMD (periodic limb movement disorder)    DEEPALI on CPAP    Hypersomnia with sleep apnea    Sleep related hypoxia    Overweight (BMI 25.0-29.9)    Infiltrating ductal carcinoma of right breast    Osteopenia     Advance Care Planning   Advance Care Planning     Advance Directive is not on file.  ACP discussion was held with the patient during this visit. Patient has an advance directive (not in EMR), copy requested.     Objective    Vitals:    02/01/24 1406   BP: 122/68   Pulse: 64   Resp: 16   Temp: 97.8 °F (36.6 °C)   TempSrc: Temporal   SpO2: 94%   Weight: 79.4 kg (175 lb)   Height: 165.1 cm (65\")     Estimated body mass index is 29.12 kg/m² as calculated from the following:    Height as of this encounter: 165.1 cm (65\").    Weight as of this encounter: 79.4 kg (175 lb).    BMI is >= 25 and <30. (Overweight) The following options were offered after discussion;: exercise counseling/recommendations      Does the patient have evidence of cognitive impairment?   No            HEALTH RISK ASSESSMENT    Smoking " Status:  Social History     Tobacco Use   Smoking Status Never   Smokeless Tobacco Never     Alcohol Consumption:  Social History     Substance and Sexual Activity   Alcohol Use Yes    Alcohol/week: 1.0 - 2.0 standard drink of alcohol    Types: 1 - 2 Glasses of wine per week    Comment: 1-2 glasses of wine a week     Fall Risk Screen:    CHARLY Fall Risk Assessment was completed, and patient is at LOW risk for falls.Assessment completed on:2024    Depression Screenin/1/2024     2:04 PM   PHQ-2/PHQ-9 Depression Screening   Little Interest or Pleasure in Doing Things 0-->not at all   Feeling Down, Depressed or Hopeless 0-->not at all   PHQ-9: Brief Depression Severity Measure Score 0       Health Habits and Functional and Cognitive Screenin/1/2024     2:05 PM   Functional & Cognitive Status   Do you have difficulty preparing food and eating? No   Do you have difficulty bathing yourself, getting dressed or grooming yourself? No   Do you have difficulty using the toilet? No   Do you have difficulty moving around from place to place? No   Do you have trouble with steps or getting out of a bed or a chair? No   Current Diet Well Balanced Diet   Dental Exam Up to date   Eye Exam Up to date   Exercise (times per week) 0 times per week   Current Exercises Include No Regular Exercise   Do you need help using the phone?  No   Are you deaf or do you have serious difficulty hearing?  No   Do you need help to go to places out of walking distance? No   Do you need help shopping? No   Do you need help preparing meals?  No   Do you need help with housework?  No   Do you need help with laundry? No   Do you need help taking your medications? No   Do you need help managing money? No   Do you ever drive or ride in a car without wearing a seat belt? No       Age-appropriate Screening Schedule:  Refer to the list below for future screening recommendations based on patient's age, sex and/or medical conditions. Orders  for these recommended tests are listed in the plan section. The patient has been provided with a written plan.    Health Maintenance   Topic Date Due    ANNUAL WELLNESS VISIT  Never done    PAP SMEAR  11/01/2022    DXA SCAN  12/18/2022    Pneumococcal Vaccine 65+ (2 of 2 - PCV) 03/25/2023    COVID-19 Vaccine (4 - 2023-24 season) 09/01/2023    MAMMOGRAM  02/24/2024    BMI FOLLOWUP  02/01/2025    COLORECTAL CANCER SCREENING  08/27/2031    TDAP/TD VACCINES (3 - Td or Tdap) 11/28/2032    HEPATITIS C SCREENING  Completed    INFLUENZA VACCINE  Completed    ZOSTER VACCINE  Completed                  CMS Preventative Services Quick Reference  Risk Factors Identified During Encounter:    Fall Risk-High or Moderate: Information on Fall Prevention Shared in After Visit Summary    The above risks/problems have been discussed with the patient.  Pertinent information has been shared with the patient in the After Visit Summary.    Diagnoses and all orders for this visit:    1. Encounter for annual wellness exam in Medicare patient (Primary)        Follow Up:   Next Medicare Wellness visit to be scheduled in 1 year.      An After Visit Summary and PPPS were made available to the patient.

## 2024-02-27 ENCOUNTER — HOSPITAL ENCOUNTER (OUTPATIENT)
Dept: MAMMOGRAPHY | Facility: HOSPITAL | Age: 69
Discharge: HOME | End: 2024-02-27
Admitting: INTERNAL MEDICINE
Payer: MEDICARE

## 2024-02-27 DIAGNOSIS — Z12.31 VISIT FOR SCREENING MAMMOGRAM: ICD-10-CM

## 2024-02-27 PROCEDURE — 77063 BREAST TOMOSYNTHESIS BI: CPT

## 2024-02-27 PROCEDURE — 77067 SCR MAMMO BI INCL CAD: CPT

## 2025-01-16 ENCOUNTER — TRANSCRIBE ORDERS (OUTPATIENT)
Dept: ADMINISTRATIVE | Facility: HOSPITAL | Age: 70
End: 2025-01-16
Payer: MEDICARE

## 2025-01-16 DIAGNOSIS — Z12.31 VISIT FOR SCREENING MAMMOGRAM: Primary | ICD-10-CM

## 2025-02-03 ENCOUNTER — LAB (OUTPATIENT)
Dept: LAB | Facility: HOSPITAL | Age: 70
End: 2025-02-03
Payer: MEDICARE

## 2025-02-03 ENCOUNTER — OFFICE VISIT (OUTPATIENT)
Dept: SLEEP MEDICINE | Facility: HOSPITAL | Age: 70
End: 2025-02-03
Payer: MEDICARE

## 2025-02-03 ENCOUNTER — OFFICE VISIT (OUTPATIENT)
Dept: INTERNAL MEDICINE | Facility: CLINIC | Age: 70
End: 2025-02-03
Payer: MEDICARE

## 2025-02-03 VITALS
SYSTOLIC BLOOD PRESSURE: 144 MMHG | BODY MASS INDEX: 28.54 KG/M2 | DIASTOLIC BLOOD PRESSURE: 88 MMHG | HEIGHT: 66 IN | HEART RATE: 80 BPM | TEMPERATURE: 98.6 F | WEIGHT: 177.6 LBS

## 2025-02-03 VITALS
OXYGEN SATURATION: 95 % | SYSTOLIC BLOOD PRESSURE: 155 MMHG | HEIGHT: 66 IN | BODY MASS INDEX: 28.45 KG/M2 | HEART RATE: 85 BPM | DIASTOLIC BLOOD PRESSURE: 88 MMHG | WEIGHT: 177 LBS

## 2025-02-03 DIAGNOSIS — G47.33 OBSTRUCTIVE SLEEP APNEA, ADULT: Primary | ICD-10-CM

## 2025-02-03 DIAGNOSIS — Z79.899 MEDICATION MANAGEMENT: ICD-10-CM

## 2025-02-03 DIAGNOSIS — M85.80 OSTEOPENIA, UNSPECIFIED LOCATION: ICD-10-CM

## 2025-02-03 DIAGNOSIS — G47.61 PLMD (PERIODIC LIMB MOVEMENT DISORDER): ICD-10-CM

## 2025-02-03 DIAGNOSIS — Z00.00 ENCOUNTER FOR ANNUAL WELLNESS EXAM IN MEDICARE PATIENT: Primary | ICD-10-CM

## 2025-02-03 LAB
ALBUMIN SERPL-MCNC: 4.5 G/DL (ref 3.5–5.2)
ALBUMIN/GLOB SERPL: 1.6 G/DL
ALP SERPL-CCNC: 91 U/L (ref 39–117)
ALT SERPL W P-5'-P-CCNC: 20 U/L (ref 1–33)
ANION GAP SERPL CALCULATED.3IONS-SCNC: 10.7 MMOL/L (ref 5–15)
AST SERPL-CCNC: 20 U/L (ref 1–32)
BASOPHILS # BLD AUTO: 0.03 10*3/MM3 (ref 0–0.2)
BASOPHILS NFR BLD AUTO: 0.7 % (ref 0–1.5)
BILIRUB SERPL-MCNC: 0.3 MG/DL (ref 0–1.2)
BUN SERPL-MCNC: 9 MG/DL (ref 8–23)
BUN/CREAT SERPL: 12 (ref 7–25)
CALCIUM SPEC-SCNC: 10.2 MG/DL (ref 8.6–10.5)
CHLORIDE SERPL-SCNC: 99 MMOL/L (ref 98–107)
CO2 SERPL-SCNC: 28.3 MMOL/L (ref 22–29)
CREAT SERPL-MCNC: 0.75 MG/DL (ref 0.57–1)
DEPRECATED RDW RBC AUTO: 44.5 FL (ref 37–54)
EGFRCR SERPLBLD CKD-EPI 2021: 86.8 ML/MIN/1.73
EOSINOPHIL # BLD AUTO: 0.09 10*3/MM3 (ref 0–0.4)
EOSINOPHIL NFR BLD AUTO: 2.1 % (ref 0.3–6.2)
ERYTHROCYTE [DISTWIDTH] IN BLOOD BY AUTOMATED COUNT: 12.8 % (ref 12.3–15.4)
GLOBULIN UR ELPH-MCNC: 2.9 GM/DL
GLUCOSE SERPL-MCNC: 91 MG/DL (ref 65–99)
HCT VFR BLD AUTO: 40.2 % (ref 34–46.6)
HGB BLD-MCNC: 13.5 G/DL (ref 12–15.9)
IMM GRANULOCYTES # BLD AUTO: 0.01 10*3/MM3 (ref 0–0.05)
IMM GRANULOCYTES NFR BLD AUTO: 0.2 % (ref 0–0.5)
LYMPHOCYTES # BLD AUTO: 1.83 10*3/MM3 (ref 0.7–3.1)
LYMPHOCYTES NFR BLD AUTO: 42.4 % (ref 19.6–45.3)
MCH RBC QN AUTO: 31.6 PG (ref 26.6–33)
MCHC RBC AUTO-ENTMCNC: 33.6 G/DL (ref 31.5–35.7)
MCV RBC AUTO: 94.1 FL (ref 79–97)
MONOCYTES # BLD AUTO: 0.33 10*3/MM3 (ref 0.1–0.9)
MONOCYTES NFR BLD AUTO: 7.6 % (ref 5–12)
NEUTROPHILS NFR BLD AUTO: 2.03 10*3/MM3 (ref 1.7–7)
NEUTROPHILS NFR BLD AUTO: 47 % (ref 42.7–76)
NRBC BLD AUTO-RTO: 0 /100 WBC (ref 0–0.2)
PLATELET # BLD AUTO: 309 10*3/MM3 (ref 140–450)
PMV BLD AUTO: 8.5 FL (ref 6–12)
POTASSIUM SERPL-SCNC: 3.9 MMOL/L (ref 3.5–5.2)
PROT SERPL-MCNC: 7.4 G/DL (ref 6–8.5)
RBC # BLD AUTO: 4.27 10*6/MM3 (ref 3.77–5.28)
SODIUM SERPL-SCNC: 138 MMOL/L (ref 136–145)
WBC NRBC COR # BLD AUTO: 4.32 10*3/MM3 (ref 3.4–10.8)

## 2025-02-03 PROCEDURE — 85025 COMPLETE CBC W/AUTO DIFF WBC: CPT | Performed by: INTERNAL MEDICINE

## 2025-02-03 PROCEDURE — G0439 PPPS, SUBSEQ VISIT: HCPCS | Performed by: INTERNAL MEDICINE

## 2025-02-03 PROCEDURE — 1159F MED LIST DOCD IN RCRD: CPT | Performed by: INTERNAL MEDICINE

## 2025-02-03 PROCEDURE — G2211 COMPLEX E/M VISIT ADD ON: HCPCS | Performed by: INTERNAL MEDICINE

## 2025-02-03 PROCEDURE — 99213 OFFICE O/P EST LOW 20 MIN: CPT | Performed by: INTERNAL MEDICINE

## 2025-02-03 PROCEDURE — 1126F AMNT PAIN NOTED NONE PRSNT: CPT | Performed by: INTERNAL MEDICINE

## 2025-02-03 PROCEDURE — 36415 COLL VENOUS BLD VENIPUNCTURE: CPT | Performed by: INTERNAL MEDICINE

## 2025-02-03 PROCEDURE — 1170F FXNL STATUS ASSESSED: CPT | Performed by: INTERNAL MEDICINE

## 2025-02-03 PROCEDURE — G0463 HOSPITAL OUTPT CLINIC VISIT: HCPCS

## 2025-02-03 PROCEDURE — 1160F RVW MEDS BY RX/DR IN RCRD: CPT | Performed by: INTERNAL MEDICINE

## 2025-02-03 PROCEDURE — 80053 COMPREHEN METABOLIC PANEL: CPT | Performed by: INTERNAL MEDICINE

## 2025-02-03 NOTE — PROGRESS NOTES
Subjective   The ABCs of the Annual Wellness Visit  Medicare Wellness Visit      Jaimie Jeffries is a 69 y.o. patient who presents for a Medicare Wellness Visit.    The following portions of the patient's history were reviewed and   updated as appropriate: allergies, current medications, past family history, past medical history, past social history, past surgical history, and problem list.    Compared to one year ago, the patient's physical   health is the same.  Compared to one year ago, the patient's mental   health is the same.    Recent Hospitalizations:  She was not admitted to the hospital during the last year.     Current Medical Providers:  Patient Care Team:  Rj Crawford MD as PCP - General (Internal Medicine)  Rj Crawford MD as PCP - Internal Medicine  Fred Denny MD as Referring Physician (Breast Surgery)  Sangeeta Tom MD as Consulting Physician (Hematology and Oncology)  Jennifer Mcclendon MD as Consulting Physician (Obstetrics and Gynecology)    Outpatient Medications Prior to Visit   Medication Sig Dispense Refill    alendronate (FOSAMAX) 70 MG tablet Take 1 (one) Tablet Once weekly as directed 4 tablet 12    calcium carbonate-cholecalciferol 500-400 MG-UNIT tablet tablet Take 1 tablet by mouth Daily.      estradiol (Vagifem) 10 MCG tablet vaginal tablet Insert 1 tablet into the vagina 2 (Two) Times a Week. 8 tablet 12    Melatonin 10 MG tablet Take 1 tablet by mouth Every Night.      Multiple Vitamins-Minerals (MULTIVITAL PO) Take 1 tablet by mouth Daily.      ondansetron ODT (ZOFRAN-ODT) 8 MG disintegrating tablet Place 1 tablet on the tongue Every 8 (Eight) Hours As Needed for Nausea or Vomiting. 21 tablet 0    fluticasone (FLONASE) 50 MCG/ACT nasal spray 2 sprays into the nostril(s) as directed by provider Daily. 9.9 mL 0    ibuprofen (ADVIL,MOTRIN) 600 MG tablet Take 1 tablet by mouth Every 8 (Eight) Hours As Needed. for pain      Scopolamine 1 MG/3DAYS patch Place 1  "patch on the skin as directed by provider Every 72 (Seventy-Two) Hours. (Patient not taking: Reported on 2/3/2025) 4 patch 0    Semaglutide, 2 MG/DOSE, (OZEMPIC) 8 MG/3ML solution pen-injector Inject 2 mg under the skin into the appropriate area as directed 1 (One) Time Per Week.      tretinoin (RETIN-A) 0.025 % cream apply a pea size amount to entire face at bed time 20 g 5    venlafaxine XR (Effexor XR) 37.5 MG 24 hr capsule Take 1 capsule by mouth Daily. 90 capsule 4     No facility-administered medications prior to visit.     No opioid medication identified on active medication list. I have reviewed chart for other potential  high risk medication/s and harmful drug interactions in the elderly.      Aspirin is not on active medication list.  Aspirin use is not indicated based on review of current medical condition/s. Risk of harm outweighs potential benefits.  .    Patient Active Problem List   Diagnosis    PLMD (periodic limb movement disorder)    DEEPALI on CPAP    Hypersomnia with sleep apnea    Sleep related hypoxia    Overweight (BMI 25.0-29.9)    Infiltrating ductal carcinoma of right breast    Osteopenia     Advance Care Planning Advance Directive is not on file.  ACP discussion was held with the patient during this visit. Patient has an advance directive (not in EMR), copy requested.            Objective   Vitals:    02/03/25 0918   BP: 144/88   Pulse: 80   Temp: 98.6 °F (37 °C)   Weight: 80.6 kg (177 lb 9.6 oz)   Height: 167.6 cm (66\")       Estimated body mass index is 28.67 kg/m² as calculated from the following:    Height as of this encounter: 167.6 cm (66\").    Weight as of this encounter: 80.6 kg (177 lb 9.6 oz).    BMI is >= 25 and <30. (Overweight) The following options were offered after discussion;: weight loss educational material (shared in after visit summary), exercise counseling/recommendations, and nutrition counseling/recommendations           Does the patient have evidence of cognitive " impairment? No                                                                                               Health  Risk Assessment    Smoking Status:  Social History     Tobacco Use   Smoking Status Never   Smokeless Tobacco Never     Alcohol Consumption:  Social History     Substance and Sexual Activity   Alcohol Use Yes    Alcohol/week: 1.0 - 2.0 standard drink of alcohol    Types: 1 - 2 Glasses of wine per week    Comment: 1-2 glasses of wine a week       Fall Risk Screen  CHARLY Fall Risk Assessment was completed, and patient is at LOW risk for falls.Assessment completed on:2/3/2025    Depression Screening   Little interest or pleasure in doing things? Not at all   Feeling down, depressed, or hopeless? Not at all   PHQ-2 Total Score 0      Health Habits and Functional and Cognitive Screenin/3/2025     9:23 AM   Functional & Cognitive Status   Do you have difficulty preparing food and eating? No   Do you have difficulty bathing yourself, getting dressed or grooming yourself? No   Do you have difficulty using the toilet? No   Do you have difficulty moving around from place to place? No   Do you have trouble with steps or getting out of a bed or a chair? No   Current Diet Well Balanced Diet   Dental Exam Up to date   Eye Exam Up to date   Exercise (times per week) 2 times per week   Current Exercises Include Walking   Do you need help using the phone?  No   Are you deaf or do you have serious difficulty hearing?  No   Do you need help to go to places out of walking distance? No   Do you need help shopping? No   Do you need help preparing meals?  No   Do you need help with housework?  No   Do you need help with laundry? No   Do you need help taking your medications? No   Do you need help managing money? No   Do you ever drive or ride in a car without wearing a seat belt? No   Have you felt unusual stress, anger or loneliness in the last month? No   Who do you live with? Alone   If you need help, do you  have trouble finding someone available to you? No   Have you been bothered in the last four weeks by sexual problems? No   Do you have difficulty concentrating, remembering or making decisions? No           Age-appropriate Screening Schedule:  Refer to the list below for future screening recommendations based on patient's age, sex and/or medical conditions. Orders for these recommended tests are listed in the plan section. The patient has been provided with a written plan.    Health Maintenance List  Health Maintenance   Topic Date Due    DXA SCAN  12/18/2022    COVID-19 Vaccine (4 - 2024-25 season) 09/01/2024    ANNUAL WELLNESS VISIT  02/01/2025    BMI FOLLOWUP  02/03/2026    MAMMOGRAM  02/27/2026    PAP SMEAR  11/01/2026    COLORECTAL CANCER SCREENING  08/27/2031    TDAP/TD VACCINES (3 - Td or Tdap) 11/28/2032    HEPATITIS C SCREENING  Completed    INFLUENZA VACCINE  Completed    Pneumococcal Vaccine 65+  Completed    ZOSTER VACCINE  Completed                                                                                                                                                CMS Preventative Services Quick Reference  Risk Factors Identified During Encounter  None Identified    The above risks/problems have been discussed with the patient.  Pertinent information has been shared with the patient in the After Visit Summary.  An After Visit Summary and PPPS were made available to the patient.    Follow Up:   Next Medicare Wellness visit to be scheduled in 1 year.     Assessment & Plan  Encounter for annual wellness exam in Medicare patient         Osteopenia, unspecified location         PLMD (periodic limb movement disorder)              Follow Up:   No follow-ups on file.

## 2025-02-03 NOTE — PROGRESS NOTES
[facility]                          Chief Complaint:   F/up DEEPALI and comorbid depression    History of present illness:   Subjective     DEEPALI:  PSG on 8/24/2018 (193 Lb): AHI 23/h;  RDI 81/h.  Patient has been on CPAP therapy since then.     She received a new CPAP in 2023.   Patient is here today for yearly follow-up.  She reported using her machine regularly.  She denies any issues with air leak or pressure intolerance.    Sleep schedule:  Bedtime 9 PM and wakes up at 7 AM.    Mask:  DreamWear nasal mask which does fit well.  No significant air leak or dry mouth  DME: [employer].     ESS: Total score: 2     Depression:  On Amitriptyline 25 mg nightly. Off Effexor. She feels better.  She had breast cancer and finished therapy .    REVIEW OF SYSTEMS:   HEENT: No nasal congestion or postnasal drip   CARDIOVASCULAR: No chest pain, chest pressure or chest discomfort. No palpitations or edema.   RESPIRATORY: No shortness of breath, cough or sputum.   GASTROINTESTINAL: No abdominal bloating or reflux   NEUROLOGICAL/PSYCHIATRY: No depression nor anxiety    Past Medical History:  Past Medical History:   Diagnosis Date    Breast cancer 2017    Right    Depression     Hx of radiation therapy     PONV (postoperative nausea and vomiting)     PREFER SCOPE PATCH,ZOFRAN,PHENERGAN   ,   Past Surgical History:   Procedure Laterality Date    ANKLE OPEN REDUCTION INTERNAL FIXATION Right 8/27/2018    Procedure: OPEN REDUCTION INTERNAL FIXATION DISLOCATED RT ANKLE FRACTURE;  Surgeon: Cristian Qureshi MD;  Location: [facility] OR [facility] [place];  Service: Orthopedics    BREAST BIOPSY Right 2012, 2014    benign    BREAST BIOPSY Right 2017    malignant    BREAST LUMPECTOMY      BREAST LUMPECTOMY WITH SENTINEL NODE BIOPSY Right 2/13/2017    Procedure: BREAST LUMPECTOMY WITH SENTINEL NODE BIOPSY AND NEEDLE LOCALIZATION;  Surgeon: Fred Denny MD;  Location: [facility] OR [facility] [place];  Service:     COLONOSCOPY      COLONOSCOPY  N/A 8/27/2021    Procedure: COLONOSCOPY INTO CECUM/ TERMINAL ILEUM;  Surgeon: Raheem Fuentes MD;  Location: [facility] ENDOSCOPY;  Service: Gastroenterology;  Laterality: N/A;  pre: screening  post: normal TI, hemorrhoids   ,   Social History     Socioeconomic History    Marital status:     Number of children: 4    Years of education: College   Tobacco Use    Smoking status: Never    Smokeless tobacco: Never   Vaping Use    Vaping status: Never Used   Substance and Sexual Activity    Alcohol use: Yes     Alcohol/week: 1.0 - 2.0 standard drink of alcohol     Types: 1 - 2 Glasses of wine per week     Comment: 1-2 glasses of wine a week    Drug use: No     Comment: Consumes one to 2 cups of caffeine per day usually coffee    Sexual activity: Yes     Comment:         and Allergies:  Patient has no known allergies.    Medication Review:     Current Outpatient Medications:     alendronate (FOSAMAX) 70 MG tablet, Take 1 (one) Tablet Once weekly as directed, Disp: 4 tablet, Rfl: 12    calcium carbonate-cholecalciferol 500-400 MG-UNIT tablet tablet, Take 1 tablet by mouth Daily., Disp: , Rfl:     estradiol (Vagifem) 10 MCG tablet vaginal tablet, Insert 1 tablet into the vagina 2 (Two) Times a Week., Disp: 8 tablet, Rfl: 12    Melatonin 10 MG tablet, Take 1 tablet by mouth Every Night., Disp: , Rfl:     Multiple Vitamins-Minerals (MULTIVITAL PO), Take 1 tablet by mouth Daily., Disp: , Rfl:     ondansetron ODT (ZOFRAN-ODT) 8 MG disintegrating tablet, Place 1 tablet on the tongue Every 8 (Eight) Hours As Needed for Nausea or Vomiting. (Patient not taking: Reported on 2/3/2025), Disp: 21 tablet, Rfl: 0    Semaglutide, 2 MG/DOSE, (OZEMPIC) 8 MG/3ML solution pen-injector, Inject 2 mg under the skin into the appropriate area as directed 1 (One) Time Per Week., Disp: , Rfl:       Objective   Vital Signs:  Vitals:    02/03/25 1328   BP: 155/88   Pulse: 85   SpO2: 95%   Weight: 80.3 kg (177 lb)   Height: 167.6 cm  "(66\")     Body mass index is 28.57 kg/m².          Physical Exam:   General Appearance:    Alert, cooperative, in no acute distress   ENMT: Freidman 3 and mallampati score 2. Scalloped tongue.    Neck:  Trachea midline. No thyromegaly.   Lungs:     Clear to auscultation,respirations regular, even and unlabored    Heart:    Regular rhythm and normal rate, normal S1 and S2, no  murmur.   Abdomen:     Obese.  Soft.  No tenderness.  No HSM    Neuro:   Conscious, alert, oriented x3. Appropriate mood and affect.    Extremities:   Moves all extremities well, no edema, no cyanosis, no   redness              Diagnostic data:  Polysomnography was performed on: 8/24/2018  AHI= 23/h; Supine AHI: 34/h; RDI = 81    CPAP download showed:  Date: Last 30 days  Usage (days): 97%  Days used>4h: 90%  AHI: 1.1/h  Leak: 31  Usage: 7 h and 11 min  CPAP: 10 cm H2O    No results found for: \"HGBA1C\"  Triglycerides   Date Value Ref Range Status   08/22/2022 138 0 - 149 mg/dL Final     HDL Cholesterol   Date Value Ref Range Status   08/22/2022 65 >39 mg/dL Final     Hemoglobin   Date Value Ref Range Status   02/03/2025 13.5 12.0 - 15.9 g/dL Final     CO2   Date Value Ref Range Status   02/03/2025 28.3 22.0 - 29.0 mmol/L Final       Assessment   DEEPALI, on CPAP  Depression, controlled  Overweight, BMI 28        PLAN:  Discussed the result of the download.   Patient is compliant with therapy and clinically benefit from treatment.  Patient was encouraged to continue using PAP.  Refill supplies.    Counseled for weight loss.  Encouraged to exercise regularly and cut down on carbohydrates.  Discussed that losing weight may decrease the severity of sleep apnea and obviate the need of CPAP therapy.      Continue Amitriptyline. Depression seems to be stable on meds and while on Pap therapy and emphasized about the importance of compliance with PAP in the setting of mood disorders.      RTC 12 months.             This note was dictated utilizing [employer] " dictation

## 2025-02-03 NOTE — PROGRESS NOTES
Subjective   Jaimie Jeffries is a 69 y.o. female.     Chief Complaint   Patient presents with    Medicare Wellness-subsequent         History of Present Illness  In for annual recheck of osteopenia, obstructive sleep apnea and history of breast cancer.  Also annual wellness visit today.  Remains on Fosamax for her osteopenia.  She has been on that for about 4 years starting with her Arimidex therapy for breast cancer.       The following portions of the patient's history were reviewed and updated as appropriate: allergies, current medications, past social history and problem list.    Outpatient Medications Marked as Taking for the 2/3/25 encounter (Office Visit) with Rj Crawford MD   Medication Sig Dispense Refill    alendronate (FOSAMAX) 70 MG tablet Take 1 (one) Tablet Once weekly as directed 4 tablet 12    calcium carbonate-cholecalciferol 500-400 MG-UNIT tablet tablet Take 1 tablet by mouth Daily.      estradiol (Vagifem) 10 MCG tablet vaginal tablet Insert 1 tablet into the vagina 2 (Two) Times a Week. 8 tablet 12    Melatonin 10 MG tablet Take 1 tablet by mouth Every Night.      Multiple Vitamins-Minerals (MULTIVITAL PO) Take 1 tablet by mouth Daily.      Semaglutide, 2 MG/DOSE, (OZEMPIC) 8 MG/3ML solution pen-injector Inject 2 mg under the skin into the appropriate area as directed 1 (One) Time Per Week.         Review of Systems   Respiratory:  Negative for shortness of breath and wheezing.    Cardiovascular:  Negative for chest pain, palpitations and leg swelling.   Gastrointestinal:  Negative for constipation and diarrhea.   Neurological:  Negative for headaches.       Objective   Vitals:    02/03/25 0918   BP: 144/88   Pulse: 80   Temp: 98.6 °F (37 °C)      Wt Readings from Last 3 Encounters:   02/03/25 80.6 kg (177 lb 9.6 oz)   02/01/24 79.4 kg (175 lb)   01/24/24 81.6 kg (180 lb)    Body mass index is 28.67 kg/m².      Physical Exam  Constitutional:       Appearance: Normal appearance. She  is well-developed.   Neck:      Thyroid: No thyromegaly.   Cardiovascular:      Rate and Rhythm: Normal rate and regular rhythm.      Heart sounds: Normal heart sounds. No murmur heard.     No gallop.   Pulmonary:      Effort: Pulmonary effort is normal. No respiratory distress.      Breath sounds: Normal breath sounds. No wheezing or rales.   Abdominal:      General: Bowel sounds are normal.      Palpations: Abdomen is soft. There is no mass.      Tenderness: There is no abdominal tenderness. There is no guarding.   Neurological:      Mental Status: She is alert.           Problems Addressed this Visit          Musculoskeletal and Injuries    Osteopenia                      Sleep    PLMD (periodic limb movement disorder)                    Other Visit Diagnoses       Encounter for annual wellness exam in Medicare patient    -  Primary          Diagnoses         Codes Comments    Encounter for annual wellness exam in Medicare patient    -  Primary ICD-10-CM: Z00.00  ICD-9-CM: V70.0     Osteopenia, unspecified location     ICD-10-CM: M85.80  ICD-9-CM: 733.90     PLMD (periodic limb movement disorder)     ICD-10-CM: G47.61  ICD-9-CM: 327.51           Assessment & Plan   In for annual recheck of osteopenia, breast cancer obstructive sleep apnea today February 2025.  She remains on Fosamax 70 mg weekly and that is being monitored by her gynecologist.  I suspect she was on it for her Arimidex therapy for breast cancer.  She could likely go on a drug holiday now.  She will discuss that with her gynecologist.  Annual lab work today will include CBC, CMP, UA.  She is taking a tiny dose of Ozempic and skips it when she is on vacations.  She is pretty happy with where her weight is now.  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  The recent 1 failed with Dr. Fink.  She is planning on getting a second opinion at [facility] in the near future.  She is fasting today.  Follow-up 1 year.      The above  information was reviewed again today 02/03/25.  It continues to be accurate as reflected above and is unchanged.  History, physical and review of systems all reviewed and are unchanged.  Medications were reviewed today and continue the current dosing.               Dragon disclaimer:   Part of this note may be an electronic transcription/translation of spoken language to printed text using the Dragon Dictation System.

## 2025-03-06 ENCOUNTER — HOSPITAL ENCOUNTER (OUTPATIENT)
Dept: MAMMOGRAPHY | Facility: HOSPITAL | Age: 70
Discharge: HOME | End: 2025-03-06
Admitting: INTERNAL MEDICINE
Payer: MEDICARE

## 2025-03-06 DIAGNOSIS — Z12.31 VISIT FOR SCREENING MAMMOGRAM: ICD-10-CM

## 2025-03-06 PROCEDURE — 77067 SCR MAMMO BI INCL CAD: CPT

## 2025-03-06 PROCEDURE — 77063 BREAST TOMOSYNTHESIS BI: CPT

## (undated) DEVICE — PAD,ABDOMINAL,8"X10",ST,LF: Brand: MEDLINE

## (undated) DEVICE — UNDERCAST PADDING: Brand: DEROYAL

## (undated) DEVICE — GLV SURG SENSICARE PI PF LF 8.5 GRN STRL

## (undated) DEVICE — BIT DRL MAXLOCK EXTRM STD 3.5MM

## (undated) DEVICE — SENSR O2 OXIMAX FNGR A/ 18IN NONSTR

## (undated) DEVICE — LN SMPL CO2 SHTRM SD STREAM W/M LUER

## (undated) DEVICE — BANDAGE,GAUZE,BULKEE II,4.5"X4.1YD,STRL: Brand: MEDLINE

## (undated) DEVICE — DISPOSABLE TOURNIQUET CUFF SINGLE BLADDER, SINGLE PORT AND QUICK CONNECT CONNECTOR: Brand: COLOR CUFF

## (undated) DEVICE — INTENDED FOR TISSUE SEPARATION, AND OTHER PROCEDURES THAT REQUIRE A SHARP SURGICAL BLADE TO PUNCTURE OR CUT.: Brand: BARD-PARKER ® CARBON RIB-BACK BLADES

## (undated) DEVICE — TUBING, SUCTION, 1/4" X 10', STRAIGHT: Brand: MEDLINE

## (undated) DEVICE — SPNG GZ WOVN 4X4IN 12PLY 10/BX STRL

## (undated) DEVICE — BNDG ELAS ELITE V/CLOSE 6IN 5YD LF STRL

## (undated) DEVICE — ELECTRD BLD EDGE/INSUL1P 2.4X5.1MM STRL

## (undated) DEVICE — CANN O2 ETCO2 FITS ALL CONN CO2 SMPL A/ 7IN DISP LF

## (undated) DEVICE — BIT DRL MAXTORQUE STD 2.4MM NS

## (undated) DEVICE — IRRIGATOR BULB ASEPTO 60CC STRL

## (undated) DEVICE — ENCORE® LATEX ORTHO SIZE 8, STERILE LATEX POWDER-FREE SURGICAL GLOVE: Brand: ENCORE

## (undated) DEVICE — DRP C/ARM 41X74IN

## (undated) DEVICE — KT ORCA ORCAPOD DISP STRL

## (undated) DEVICE — SUT VIC 3/0 SH 27IN J416H

## (undated) DEVICE — BIT DRL MAXLOCK EXTRM STD 2.7MM

## (undated) DEVICE — DRSNG GZ CURAD XEROFORM NONADHS 5X9IN STRL

## (undated) DEVICE — 3M™ STERI-STRIP™ COMPOUND BENZOIN TINCTURE 40 BAGS/CARTON 4 CARTONS/CASE C1544: Brand: 3M™ STERI-STRIP™

## (undated) DEVICE — SUT PROLN 3/0 FS2 18IN 8665G

## (undated) DEVICE — APPL CHLORAPREP W/TINT 26ML ORNG

## (undated) DEVICE — SUT VIC 2/0 X1 27IN J459H

## (undated) DEVICE — PK ORTHO MINOR TOWER 40

## (undated) DEVICE — SPLNT PLSTR ORTHO 5IN

## (undated) DEVICE — PREMIUM WET SKIN PREP TRAY: Brand: MEDLINE INDUSTRIES, INC.

## (undated) DEVICE — CVR TRANSD CIV FLX TPR 11.9 TO 3.8X61CM

## (undated) DEVICE — UNDYED BRAIDED (POLYGLACTIN 910), SYNTHETIC ABSORBABLE SUTURE: Brand: COATED VICRYL

## (undated) DEVICE — ADAPT CLN BIOGUARD AIR/H2O DISP

## (undated) DEVICE — TOWEL,OR,DSP,ST,BLUE,STD,4/PK,20PK/CS: Brand: MEDLINE

## (undated) DEVICE — PROXIMATE RH ROTATING HEAD SKIN STAPLERS (35 WIDE) CONTAINS 35 STAINLESS STEEL STAPLES: Brand: PROXIMATE

## (undated) DEVICE — SUT VIC 0 CT1 CR8 27IN JJ41G

## (undated) DEVICE — SUT SILK 3/0 TIES 18IN A184H

## (undated) DEVICE — STCKNT IMPERV 12IN STRL

## (undated) DEVICE — SUT SILK 2/0 FS BLK 18IN 685G

## (undated) DEVICE — PK PROC MINOR TOWER 40